# Patient Record
Sex: FEMALE | Race: WHITE | NOT HISPANIC OR LATINO | Employment: OTHER | URBAN - METROPOLITAN AREA
[De-identification: names, ages, dates, MRNs, and addresses within clinical notes are randomized per-mention and may not be internally consistent; named-entity substitution may affect disease eponyms.]

---

## 2017-04-19 ENCOUNTER — TRANSFERRED RECORDS (OUTPATIENT)
Dept: HEALTH INFORMATION MANAGEMENT | Facility: CLINIC | Age: 72
End: 2017-04-19

## 2017-04-26 ENCOUNTER — SURGERY (OUTPATIENT)
Age: 72
End: 2017-04-26

## 2017-04-26 ENCOUNTER — HOSPITAL ENCOUNTER (OUTPATIENT)
Facility: CLINIC | Age: 72
Discharge: HOME | End: 2017-04-26
Attending: COLON & RECTAL SURGERY | Admitting: COLON & RECTAL SURGERY
Payer: MEDICARE

## 2017-04-26 ENCOUNTER — ANESTHESIA EVENT (OUTPATIENT)
Dept: GASTROENTEROLOGY | Facility: CLINIC | Age: 72
End: 2017-04-26
Payer: MEDICARE

## 2017-04-26 ENCOUNTER — ANESTHESIA (OUTPATIENT)
Dept: GASTROENTEROLOGY | Facility: CLINIC | Age: 72
End: 2017-04-26
Payer: MEDICARE

## 2017-04-26 VITALS
DIASTOLIC BLOOD PRESSURE: 88 MMHG | BODY MASS INDEX: 23.16 KG/M2 | SYSTOLIC BLOOD PRESSURE: 152 MMHG | HEIGHT: 65 IN | RESPIRATION RATE: 11 BRPM | OXYGEN SATURATION: 97 % | WEIGHT: 139 LBS

## 2017-04-26 LAB — COLONOSCOPY: NORMAL

## 2017-04-26 PROCEDURE — 25800025 ZZH RX 258: Performed by: NURSE ANESTHETIST, CERTIFIED REGISTERED

## 2017-04-26 PROCEDURE — 25000125 ZZHC RX 250: Performed by: NURSE ANESTHETIST, CERTIFIED REGISTERED

## 2017-04-26 PROCEDURE — G0105 COLORECTAL SCRN; HI RISK IND: HCPCS | Performed by: COLON & RECTAL SURGERY

## 2017-04-26 PROCEDURE — 37000008 ZZH ANESTHESIA TECHNICAL FEE, 1ST 30 MIN: Performed by: COLON & RECTAL SURGERY

## 2017-04-26 PROCEDURE — 45378 DIAGNOSTIC COLONOSCOPY: CPT | Performed by: COLON & RECTAL SURGERY

## 2017-04-26 PROCEDURE — 37000009 ZZH ANESTHESIA TECHNICAL FEE, EACH ADDTL 15 MIN: Performed by: COLON & RECTAL SURGERY

## 2017-04-26 PROCEDURE — 25000128 H RX IP 250 OP 636: Performed by: NURSE ANESTHETIST, CERTIFIED REGISTERED

## 2017-04-26 PROCEDURE — 40000010 ZZH STATISTIC ANES STAT CODE-CRNA PER MINUTE: Performed by: COLON & RECTAL SURGERY

## 2017-04-26 RX ORDER — ONDANSETRON 4 MG/1
4 TABLET, ORALLY DISINTEGRATING ORAL EVERY 6 HOURS PRN
Status: DISCONTINUED | OUTPATIENT
Start: 2017-04-26 | End: 2017-04-26 | Stop reason: HOSPADM

## 2017-04-26 RX ORDER — ONDANSETRON 2 MG/ML
4 INJECTION INTRAMUSCULAR; INTRAVENOUS EVERY 6 HOURS PRN
Status: DISCONTINUED | OUTPATIENT
Start: 2017-04-26 | End: 2017-04-26 | Stop reason: HOSPADM

## 2017-04-26 RX ORDER — PROCHLORPERAZINE MALEATE 5 MG
5 TABLET ORAL EVERY 6 HOURS PRN
Status: DISCONTINUED | OUTPATIENT
Start: 2017-04-26 | End: 2017-04-26 | Stop reason: HOSPADM

## 2017-04-26 RX ORDER — ONDANSETRON 2 MG/ML
INJECTION INTRAMUSCULAR; INTRAVENOUS PRN
Status: DISCONTINUED | OUTPATIENT
Start: 2017-04-26 | End: 2017-04-26

## 2017-04-26 RX ORDER — ONDANSETRON 2 MG/ML
4 INJECTION INTRAMUSCULAR; INTRAVENOUS
Status: DISCONTINUED | OUTPATIENT
Start: 2017-04-26 | End: 2017-04-26 | Stop reason: HOSPADM

## 2017-04-26 RX ORDER — PROPOFOL 10 MG/ML
INJECTION, EMULSION INTRAVENOUS CONTINUOUS PRN
Status: DISCONTINUED | OUTPATIENT
Start: 2017-04-26 | End: 2017-04-26

## 2017-04-26 RX ORDER — FENTANYL CITRATE 50 UG/ML
INJECTION, SOLUTION INTRAMUSCULAR; INTRAVENOUS PRN
Status: DISCONTINUED | OUTPATIENT
Start: 2017-04-26 | End: 2017-04-26

## 2017-04-26 RX ORDER — HYDRALAZINE HYDROCHLORIDE 20 MG/ML
INJECTION INTRAMUSCULAR; INTRAVENOUS PRN
Status: DISCONTINUED | OUTPATIENT
Start: 2017-04-26 | End: 2017-04-26

## 2017-04-26 RX ORDER — ASPIRIN 325 MG/1
650 TABLET, FILM COATED ORAL 2 TIMES DAILY PRN
Status: ON HOLD | COMMUNITY
End: 2023-09-22

## 2017-04-26 RX ORDER — SODIUM CHLORIDE, SODIUM LACTATE, POTASSIUM CHLORIDE, CALCIUM CHLORIDE 600; 310; 30; 20 MG/100ML; MG/100ML; MG/100ML; MG/100ML
INJECTION, SOLUTION INTRAVENOUS CONTINUOUS PRN
Status: DISCONTINUED | OUTPATIENT
Start: 2017-04-26 | End: 2017-04-26

## 2017-04-26 RX ORDER — LIDOCAINE 40 MG/G
CREAM TOPICAL
Status: DISCONTINUED | OUTPATIENT
Start: 2017-04-26 | End: 2017-04-26 | Stop reason: HOSPADM

## 2017-04-26 RX ORDER — FLUMAZENIL 0.1 MG/ML
0.2 INJECTION, SOLUTION INTRAVENOUS
Status: DISCONTINUED | OUTPATIENT
Start: 2017-04-26 | End: 2017-04-26 | Stop reason: HOSPADM

## 2017-04-26 RX ORDER — NALOXONE HYDROCHLORIDE 0.4 MG/ML
.1-.4 INJECTION, SOLUTION INTRAMUSCULAR; INTRAVENOUS; SUBCUTANEOUS
Status: DISCONTINUED | OUTPATIENT
Start: 2017-04-26 | End: 2017-04-26 | Stop reason: HOSPADM

## 2017-04-26 RX ADMIN — FENTANYL CITRATE 50 MCG: 50 INJECTION, SOLUTION INTRAMUSCULAR; INTRAVENOUS at 11:06

## 2017-04-26 RX ADMIN — SODIUM CHLORIDE, POTASSIUM CHLORIDE, SODIUM LACTATE AND CALCIUM CHLORIDE: 600; 310; 30; 20 INJECTION, SOLUTION INTRAVENOUS at 10:47

## 2017-04-26 RX ADMIN — PROPOFOL 80 MCG/KG/MIN: 10 INJECTION, EMULSION INTRAVENOUS at 10:50

## 2017-04-26 RX ADMIN — HYDRALAZINE HYDROCHLORIDE 5 MG: 20 INJECTION INTRAMUSCULAR; INTRAVENOUS at 11:06

## 2017-04-26 RX ADMIN — MIDAZOLAM HYDROCHLORIDE 2 MG: 1 INJECTION, SOLUTION INTRAMUSCULAR; INTRAVENOUS at 10:50

## 2017-04-26 RX ADMIN — DEXMEDETOMIDINE HYDROCHLORIDE 8 MCG: 100 INJECTION, SOLUTION INTRAVENOUS at 10:51

## 2017-04-26 RX ADMIN — ONDANSETRON 4 MG: 2 INJECTION INTRAMUSCULAR; INTRAVENOUS at 11:01

## 2017-04-26 RX ADMIN — DEXMEDETOMIDINE HYDROCHLORIDE 4 MCG: 100 INJECTION, SOLUTION INTRAVENOUS at 10:59

## 2017-04-26 RX ADMIN — FENTANYL CITRATE 50 MCG: 50 INJECTION, SOLUTION INTRAMUSCULAR; INTRAVENOUS at 10:50

## 2017-04-26 ASSESSMENT — ENCOUNTER SYMPTOMS: SEIZURES: 0

## 2017-04-26 NOTE — ANESTHESIA POSTPROCEDURE EVALUATION
Patient: Sixto Jimenez    Procedure(s):  COLONOSCOPY (MAC)  - Wound Class: II-Clean Contaminated    Diagnosis:PERSONAL HISTORY OF COLON CANCER   Diagnosis Additional Information: No value filed.    Anesthesia Type:  MAC    Note:  Anesthesia Post Evaluation    Patient location during evaluation: Endoscopy Recovery  Patient participation: Able to fully participate in evaluation  Level of consciousness: awake  Pain management: adequate  Airway patency: patent  Cardiovascular status: acceptable  Respiratory status: acceptable  Hydration status: acceptable  PONV: none     Anesthetic complications: None    Comments: Patient with elevated diastolic pressure in Preop.  Minimal improvement intraoperatively after sedation.  Treated with 5 mg hydralazine  With good response.  In post op patient instructed to followup with her primary care physician.         Last vitals:  Vitals:    04/26/17 1140 04/26/17 1150 04/26/17 1200   BP: 136/85 (!) 149/95 152/88   Resp: 20 9 11   SpO2: 96% 98% 97%         Electronically Signed By: Taqueria Bee MD  April 26, 2017  5:23 PM

## 2017-04-26 NOTE — ANESTHESIA CARE TRANSFER NOTE
Patient: Sixto Jimenez    Procedure(s):  COLONOSCOPY (MAC)  - Wound Class: II-Clean Contaminated    Diagnosis: PERSONAL HISTORY OF COLON CANCER   Diagnosis Additional Information: No value filed.    Anesthesia Type:   MAC     Note:  Airway :Room Air  Destination: endo 35.  Comments: Pt exhibits spont resps, all monitors and alarms on in pacu, report given to RN, vss.      Vitals: (Last set prior to Anesthesia Care Transfer)    CRNA VITALS  4/26/2017 1049 - 4/26/2017 1125      4/26/2017             NIBP: 118/71    Pulse: 78    NIBP Mean: 87    Ht Rate: 75    SpO2: 98 %    Resp Rate (set): 10                Electronically Signed By: EMANUEL Ennis CRNA  April 26, 2017  11:25 AM

## 2017-04-26 NOTE — BRIEF OP NOTE
[facility] Brief Operative Note    Pre-operative diagnosis: PERSONAL HISTORY OF COLON CANCER    Post-operative diagnosis normal colon      Procedure: Procedure(s):  COLONOSCOPY (MAC)  - Wound Class: II-Clean Contaminated   Surgeon(s): Surgeon(s) and Role:     * Diana Vázquez MD - Primary   Estimated blood loss: * No values recorded between 4/26/2017 12:00 AM and 4/26/2017 11:17 AM *    Specimens: * No specimens in log *   Findings: See Provation procedure note in Epic

## 2017-04-26 NOTE — ANESTHESIA PREPROCEDURE EVALUATION
Procedure: Procedure(s):  COLONOSCOPY  Preop diagnosis: PERSONAL HISTORY OF COLON CANCER   Allergies   Allergen Reactions     Fosamax [Alendronic Acid]      Morphine      Panic attack     Seasonal Allergies      There is no problem list on file for this patient.    Past Medical History:   Diagnosis Date     Allergic rhinitis, cause unspecified      Chronic abdominal pain      Colon cancer (H)      Fibromyalgia      Past Surgical History:   Procedure Laterality Date     CHOLECYSTECTOMY, LAPOROSCOPIC      Cholecystectomy, Laparoscopic     COLON SURGERY       COLONOSCOPY  4/23/2014    Procedure: COLONOSCOPY;  Surgeon: Diana Vázquez MD;  Location: [facility]     fractured fibula       fractured wrist       TONSILLECTOMY         No current facility-administered medications on file prior to encounter.   Current Outpatient Prescriptions on File Prior to Encounter:  VITAMIN E COMPLEX PO    AMITRIPTYLINE HCL PO    TEMAZEPAM PO    HYDROcodone-acetaminophen (NORCO) 5-325 MG per tablet Take 1 tablet by mouth every 6 hours as needed for moderate to severe pain   Acetaminophen (TYLENOL PO)    PREDNISONE 20 MG OR TABS 1 TABLET DAILY   DETROL LA 4 MG OR CP24 1 CAPSULE DAILY   ALLEGRA 180 MG OR TABS 1 tablet qd   NIACIN  MG OR TBCR 1 TABLET tiMES DAILY   NIACIN 500 MG OR TBCR 1 TABLET  DAILY   CALCIUM 600 MG OR TABS unknown dose once or twice daily   MULTI-VITAMIN OR TABS    ALLEGRA OR 2 TABLETS TWICE DAILY   DETROL LA OR 1 CAPSULE DAILY     There were no vitals taken for this visit.    Lab Results   Component Value Date    WBC 5.1 04/26/2006     HGB 13.6  Anesthesia Evaluation     . Pt has had prior anesthetic.     No history of anesthetic complications          ROS/MED HX    ENT/Pulmonary:     (+)allergic rhinitis, , . .   (-) recent URI   Neurologic:  - neg neurologic ROS    (-) seizures, CVA and migraines   Cardiovascular: Comment: Elevated BP in preop - ? White coat syndrome    (+) Dyslipidemia, ----. : . . . :.  Irregular Heartbeat/Palpitations, valvular problems/murmurs type: MVP .       METS/Exercise Tolerance:     Hematologic:     (+) Anemia, -      Musculoskeletal: Comment: Myalgia and myositis  sciatica  (+) arthritis, , , -       GI/Hepatic:  - neg GI/hepatic ROS   (+) Other GI/Hepatic colon cancer, chronic abdominal pain     (-) GERD   Renal/Genitourinary: Comment: Urinary incontinence    Urethral stricture        Endo:  - neg endo ROS    (-) Type II DM and thyroid disease   Psychiatric:     (+) psychiatric history anxiety      Infectious Disease:         Malignancy:         Other: Comment: Controlled substance agreement   (+) H/O Chronic Pain,                   Physical Exam  Normal systems: cardiovascular, pulmonary and dental    Airway   Mallampati: II  TM distance: >3 FB  Neck ROM: full    Dental     Cardiovascular   Rhythm and rate: regular and normal      Pulmonary    breath sounds clear to auscultation                    Anesthesia Plan      History & Physical Review  History and physical reviewed and following examination; no interval change.    ASA Status:  2 .    NPO Status:  > 8 hours    Plan for MAC Reason for MAC:  Deep or markedly invasive procedure (G8)  PONV prophylaxis:  Ondansetron (or other 5HT-3)       Postoperative Care  Postoperative pain management:  IV analgesics.      Consents  Anesthetic plan, risks, benefits and alternatives discussed with:  Patient and Spouse..                          .

## 2018-11-02 ENCOUNTER — TELEPHONE (OUTPATIENT)
Dept: CARDIOLOGY | Facility: CLINIC | Age: 73
End: 2018-11-02

## 2018-11-02 NOTE — TELEPHONE ENCOUNTER
Call from Sixto Jimenez.  Her mother had angina, her sister has heart problem unknown to Sixto, but does also have an irregular heart beat.  Sixto has had an irregular heartbeat for several years, and had a monitor placed about 7 years ago.  Had a heart scan in 2015 at [facility].  Now has high blood pressure and has had high cholesterol for quite a few years.  PMD is Keisha Pitts at [facility].  Sixto will see her around 11/14, and will have her fax pertinent records to Triage (gave her fax # for here).  Advised patient that if her irregular heartbeat becomes longer-lasting or she starts feeling unwell with it, she should be seen in the ED, preferrably at [facility], if possible.  She lives in [place], close to the office, and she says that she will do that.  She requested to see Dr. Charles because her sister sees Dr. Charles.

## 2019-01-28 PROBLEM — E78.5 HYPERLIPIDEMIA: Status: ACTIVE | Noted: 2019-01-28

## 2019-01-28 PROBLEM — E78.2 MIXED HYPERLIPIDEMIA: Status: ACTIVE | Noted: 2019-01-28

## 2019-01-28 PROBLEM — R03.0 ELEVATED BLOOD PRESSURE READING WITHOUT DIAGNOSIS OF HYPERTENSION: Status: ACTIVE | Noted: 2017-10-15

## 2019-01-28 PROBLEM — I05.9 MITRAL VALVE DISEASE: Status: ACTIVE | Noted: 2019-01-28

## 2019-01-28 PROBLEM — I10 BENIGN ESSENTIAL HYPERTENSION: Status: ACTIVE | Noted: 2017-10-15

## 2019-01-29 ENCOUNTER — OFFICE VISIT (OUTPATIENT)
Dept: CARDIOLOGY | Facility: CLINIC | Age: 74
End: 2019-01-29
Payer: MEDICARE

## 2019-01-29 VITALS
DIASTOLIC BLOOD PRESSURE: 82 MMHG | WEIGHT: 149 LBS | HEART RATE: 60 BPM | HEIGHT: 63 IN | SYSTOLIC BLOOD PRESSURE: 114 MMHG | BODY MASS INDEX: 26.4 KG/M2

## 2019-01-29 DIAGNOSIS — R00.2 PALPITATIONS: ICD-10-CM

## 2019-01-29 DIAGNOSIS — I10 BENIGN ESSENTIAL HYPERTENSION: Primary | ICD-10-CM

## 2019-01-29 PROCEDURE — 93000 ELECTROCARDIOGRAM COMPLETE: CPT | Performed by: INTERNAL MEDICINE

## 2019-01-29 PROCEDURE — 99214 OFFICE O/P EST MOD 30 MIN: CPT | Performed by: INTERNAL MEDICINE

## 2019-01-29 RX ORDER — METOPROLOL SUCCINATE 25 MG/1
100 TABLET, EXTENDED RELEASE ORAL DAILY
COMMUNITY

## 2019-01-29 RX ORDER — MULTIVIT-MIN/IRON/FOLIC ACID/K 18-600-40
1000 CAPSULE ORAL
COMMUNITY
End: 2023-09-20

## 2019-01-29 RX ORDER — LORAZEPAM 0.5 MG/1
0.25 TABLET ORAL DAILY PRN
Status: ON HOLD | COMMUNITY
End: 2023-09-23

## 2019-01-29 SDOH — HEALTH STABILITY: MENTAL HEALTH: HOW OFTEN DO YOU HAVE A DRINK CONTAINING ALCOHOL?: NEVER

## 2019-01-29 ASSESSMENT — MIFFLIN-ST. JEOR: SCORE: 1163.95

## 2019-01-29 NOTE — LETTER
2019    JOANNE  YEMI  [facility] [address]  [facility] [address]    RE: Sixto Jimenez       Dear Colleague,    I had the pleasure of seeing Sixto Jimenez in the [facility].    HPI:     Please see dictated note    PAST MEDICAL HISTORY:  Past Medical History:   Diagnosis Date     Allergic rhinitis, cause unspecified      Benign essential hypertension 10/15/2017    Overview:  Trial of lisinopril, propranolol; Stresses anxety, amlodipine 2.5 min bowel not emptying Metoprolol: 25 mg is ok.  Increase to 1.5, bowel does not  across upper right. Stenotic area.      Chronic abdominal pain      Chronic bilateral low back pain     DDD     Chronic constipation      Colon cancer (H)      Fibromyalgia      Heart disease     MVP and PVC's     Mixed hyperlipidemia 2019    Overview:  Last Lipids: Chol: 245    2007 T    2007 HDL:   94    2007 LDL:  128    2007     Osteoporosis        CURRENT MEDICATIONS:  Current Outpatient Medications   Medication Sig Dispense Refill     AMITRIPTYLINE HCL PO Take 25 mg by mouth        aspirin - buffered (BUFFERIN) 325 MG TABS tablet Take 1,200 mg by mouth daily        HYDROcodone-acetaminophen (NORCO) 5-325 MG per tablet Take 0.5 tablets by mouth daily        LORazepam (ATIVAN) 1 MG tablet Take 1 mg by mouth every 6 hours as needed for anxiety       metoprolol succinate ER (TOPROL-XL) 25 MG 24 hr tablet Take 125 mg by mouth daily Takes 50mg qam and 75mg qhs       MULTI-VITAMIN OR TABS   0     Omega-3 Fatty Acids (FISH OIL PO)        Sennosides (SENNA LAXATIVE PO) Take by mouth 6 times daily       TEMAZEPAM PO Take 15 mg by mouth nightly as needed        Vitamin D, Cholecalciferol, 1000 units TABS Take 1,000 Units by mouth       CALCIUM 600 MG OR TABS unknown dose once or twice daily  0       PAST SURGICAL HISTORY:  Past Surgical History:   Procedure Laterality Date     CHOLECYSTECTOMY, LAPOROSCOPIC       Cholecystectomy, Laparoscopic     COLON SURGERY       COLONOSCOPY  4/23/2014    Procedure: COLONOSCOPY;  Surgeon: Diana Vázquez MD;  Location:  GI     COLONOSCOPY N/A 4/26/2017    Procedure: COLONOSCOPY;  COLONOSCOPY (MAC) ;  Surgeon: Diana Vázquez MD;  Location:  GI     fractured fibula       fractured wrist       GYN SURGERY      tubal ligation     ILEOSTOMY      leaks afterwards     TONSILLECTOMY         ALLERGIES     Allergies   Allergen Reactions     Fosamax [Alendronic Acid]      Shut down system     Morphine      Mental status change     Risedronate Sodium [Risedronate]      Shut down system       FAMILY HISTORY:  No family history on file.    SOCIAL HISTORY:  Social History     Socioeconomic History     Marital status:      Spouse name: Not on file     Number of children: Not on file     Years of education: Not on file     Highest education level: Not on file   Social Needs     Financial resource strain: Not on file     Food insecurity - worry: Not on file     Food insecurity - inability: Not on file     Transportation needs - medical: Not on file     Transportation needs - non-medical: Not on file   Occupational History     Not on file   Tobacco Use     Smoking status: Former Smoker   Substance and Sexual Activity     Alcohol use: Yes     Comment: occasionally     Drug use: No     Sexual activity: Not on file   Other Topics Concern     Parent/sibling w/ CABG, MI or angioplasty before 65F 55M? Not Asked   Social History Narrative     Not on file       ROS:   Constitutional: No fever, chills, or sweats. No weight gain/loss   ENT: No visual disturbance, ear ache, epistaxis, sore throat  Allergies/Immunologic: Negative.   Respiratory: No cough, hemoptysia  Cardiovascular: As per HPI  GI: No nausea, vomiting, hematemesis, melena, or hematochezia  : No urinary frequency, dysuria, or hematuria  Integument: Negative  Psychiatric: Negative  Neuro: Negative  Endocrinology: Negative  "  Musculoskeletal: Negative    EXAM:  Ht 1.607 m (5' 3.25\")   Wt 67.6 kg (149 lb)   BMI 26.19 kg/m     In general, the patient is a pleasant female in no apparent distress.    HEENT: NC/AT.  PERRLA.  EOMI.  Sclerae white, not injected.  Nares clear.  Pharynx without erythema or exudate.  Dentition intact.    Neck: No adenopathy.  No thyromegaly. Carotids +4/4 bilaterally without bruits.  No jugular venous distension.   Heart: RRR. Normal S1, S2 splits physiologically. No murmur, rub, click, or gallop. The PMI is in the 5th ICS in the midclavicular line. There is no heave.    Lungs: CTA.  No ronchi, wheezes, rales.  No dullness to percussion.   Abdomen: Soft, nontender, nondistended. No organomegaly.  No bruits.   Extremities: No clubbing, cyanosis, or edema.  The pulses are +4/4 at the radial, brachial, femoral, popliteal, DP, and PT sites bilaterally.  No bruits are noted.  Neurologic: Alert and oriented to person/place/time, normal speech, gait and affect  Skin: No petechiae, purpura or rash.    ROSETTA Charles MD [facility]      CC  Patient Care Team:  Keihsa Pitts as PCP - KEISHA Ward    Service Date: 01/29/2019      HISTORY OF PRESENT ILLNESS:  Ms. Jimenez is a very pleasant, 73-year-old woman who is establishing care in Cardiology Clinic.  She has no known cardiac history.  She has some right arm pain, which is clearly musculoskeletal and not associated with her heart, but I think that was part of the reason for her visit.  It has been going on for a couple of months.  She has been doing physical therapy, but is thinking at this point that maybe she needs imaging from an orthopedist.  She also has a history of stage III colon cancer back in 2010, for which she received 6 months of chemotherapy and had to undergo 6 surgeries, but that is felt to be cured.  She did have an echo around that time in 2011 that showed a structurally normal heart and normal ejection fraction, no regional wall motion " abnormalities.  She saw Dr. Palacios at [facility] for preventative health in 10/2015.  At that time, he just noted that she had some hypertension and recommended she start a blood pressure medicine and hyperlipidemia.  Her cholesterol was 176, and he discussed starting a statin.  She finally started the blood pressure medicine about a year ago by her primary.  She was started on Toprol 50 in the morning and 75 in the evening, which she has been taking and tolerating.  She is on doses of aspirin as well for her fibromyalgia, presumably.  Her blood pressure is under good control.  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  She is .  She walks on her Fitbit 2.5-5 miles depending on the day and feels good with that.  No chest pain or tightness.  I do note that back in 2015, she had a coronary calcium score in December, and it was 0.4, so very low, and the decision was made to not start a statin.  Her most recent cholesterol in the system was from 03/2017.  Her LDL was 151 with an HDL of 71.  She does have a family history of early coronary artery disease with dad having an MI in his 40s, but he was a smoker.  She is otherwise healthy with no concerning symptoms.      IMPRESSION, REPORT AND PLAN:   1.  History of hypertension, well controlled on Toprol.   2.  Fibromyalgia.   3.  History of low coronary calcium score in 12/2015 at 0.4 without any cardiac symptoms.   4.  Hyperlipidemia, not on a statin with an LDL of 151 and HDL of 71 when last checked 03/2017.   5.  History of colon cancer, status post chemotherapy and resection in 2010.   6.  Right arm musculoskeletal symptoms.      DISCUSSION:  It was a pleasure being involved in the care of Ms. Jimenez.  I discussed her history and symptoms in detail as outlined.  Fortunately, at this time she is doing well without any concerning cardiac symptoms.  We discussed that I would agree that I would not force her to start a statin when she is hesitant to  do so given her low coronary calcium score.  It would not be unreasonable to recheck her cholesterol and see where she is at now, which she plans to do.  I encouraged her to get more active in terms of exercise as well.  Her blood pressure is under good control.  She is on a baby aspirin.  There are no additional recommendations at this time from a cardiac standpoint, which she understands.  She will call if any additional symptoms occur.        It was a pleasure being involved in her care.  Please do not hesitate to contact me with any questions or concerns.         GARCÍA CHARLES MD             D: 2019   T: 2019   MT: [email or web address]      Name:     RIGOBERTO BRANDON   MRN:      [id number]        Account:      [id number]   :      1945           Service Date: 2019      Document: [id number]       Thank you for allowing me to participate in the care of your patient.      Sincerely,     García Charles MD     [facility]    cc:   Keisha Pitts  [facility] MEDICAL [address] [address]

## 2019-01-29 NOTE — LETTER
1/29/2019      JOANNE  WALKER  [facility] [address]  [facility] [address]      RE: Sixto Jimenez       Dear Colleague,    I had the pleasure of seeing Sixto Jimenez in the [facility].    Service Date: 01/29/2019      HISTORY OF PRESENT ILLNESS:  Ms. Jimenez is a very pleasant, 73-year-old woman who is establishing care in Cardiology Clinic.  She has no known cardiac history.  She has some right arm pain, which is clearly musculoskeletal and not associated with her heart, but I think that was part of the reason for her visit.  It has been going on for a couple of months.  She has been doing physical therapy, but is thinking at this point that maybe she needs imaging from an orthopedist.  She also has a history of stage III colon cancer back in 2010, for which she received 6 months of chemotherapy and had to undergo 6 surgeries, but that is felt to be cured.  She did have an echo around that time in 2011 that showed a structurally normal heart and normal ejection fraction, no regional wall motion abnormalities.  She saw Dr. Palacios at [facility] for preventative health in 10/2015.  At that time, he just noted that she had some hypertension and recommended she start a blood pressure medicine and hyperlipidemia.  Her cholesterol was 176, and he discussed starting a statin.  She finally started the blood pressure medicine about a year ago by her primary.  She was started on Toprol 50 in the morning and 75 in the evening, which she has been taking and tolerating.  She is on doses of aspirin as well for her fibromyalgia, presumably.  Her blood pressure is under good control.  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  She is .  She walks on her Fitbit 2.5-5 miles depending on the day and feels good with that.  No chest pain or tightness.  I do note that back in 2015, she had a coronary calcium score in December, and it was 0.4, so very low, and the  decision was made to not start a statin.  Her most recent cholesterol in the system was from 2017.  Her LDL was 151 with an HDL of 71.  She does have a family history of early coronary artery disease with dad having an MI in his 40s, but he was a smoker.  She is otherwise healthy with no concerning symptoms.      IMPRESSION, REPORT AND PLAN:   1.  History of hypertension, well controlled on Toprol.   2.  Fibromyalgia.   3.  History of low coronary calcium score in 2015 at 0.4 without any cardiac symptoms.   4.  Hyperlipidemia, not on a statin with an LDL of 151 and HDL of 71 when last checked 2017.   5.  History of colon cancer, status post chemotherapy and resection in .   6.  Right arm musculoskeletal symptoms.      DISCUSSION:  It was a pleasure being involved in the care of Ms. Jimenez.  I discussed her history and symptoms in detail as outlined.  Fortunately, at this time she is doing well without any concerning cardiac symptoms.  We discussed that I would agree that I would not force her to start a statin when she is hesitant to do so given her low coronary calcium score.  It would not be unreasonable to recheck her cholesterol and see where she is at now, which she plans to do.  I encouraged her to get more active in terms of exercise as well.  Her blood pressure is under good control.  She is on a baby aspirin.  There are no additional recommendations at this time from a cardiac standpoint, which she understands.  She will call if any additional symptoms occur.        It was a pleasure being involved in her care.  Please do not hesitate to contact me with any questions or concerns.         NANNETTE JORDAN MD             D: 2019   T: 2019   MT: deshaun      Name:     RIGOBERTO JIMENEZ   MRN:      [id number]        Account:      [id number]   :      1945           Service Date: 2019      Document: [id number]         Outpatient Encounter Medications as of 2019   Medication  Sig Dispense Refill     AMITRIPTYLINE HCL PO Take 25 mg by mouth        aspirin - buffered (BUFFERIN) 325 MG TABS tablet Take 1,200 mg by mouth daily        HYDROcodone-acetaminophen (NORCO) 5-325 MG per tablet Take 0.5 tablets by mouth daily        LORazepam (ATIVAN) 1 MG tablet Take 1 mg by mouth every 6 hours as needed for anxiety       metoprolol succinate ER (TOPROL-XL) 25 MG 24 hr tablet Take 125 mg by mouth daily Takes 50mg qam and 75mg qhs       MULTI-VITAMIN OR TABS   0     Omega-3 Fatty Acids (FISH OIL PO)        Sennosides (SENNA LAXATIVE PO) Take by mouth 6 times daily       TEMAZEPAM PO Take 15 mg by mouth nightly as needed        Vitamin D, Cholecalciferol, 1000 units TABS Take 1,000 Units by mouth       CALCIUM 600 MG OR TABS unknown dose once or twice daily  0     No facility-administered encounter medications on file as of 1/29/2019.        Again, thank you for allowing me to participate in the care of your patient.      Sincerely,    García Charles MD     [facility]

## 2019-01-29 NOTE — PROGRESS NOTES
Service Date: 01/29/2019      HISTORY OF PRESENT ILLNESS:  Ms. Jimenez is a very pleasant, 73-year-old woman who is establishing care in [facility].  She has no known cardiac history.  She has some right arm pain, which is clearly musculoskeletal and not associated with her heart, but I think that was part of the reason for her visit.  It has been going on for a couple of months.  She has been doing physical therapy, but is thinking at this point that maybe she needs imaging from an orthopedist.  She also has a history of stage III colon cancer back in 2010, for which she received 6 months of chemotherapy and had to undergo 6 surgeries, but that is felt to be cured.  She did have an echo around that time in 2011 that showed a structurally normal heart and normal ejection fraction, no regional wall motion abnormalities.  She saw Dr. Palacios at [facility] for preventative health in 10/2015.  At that time, he just noted that she had some hypertension and recommended she start a blood pressure medicine and hyperlipidemia.  Her cholesterol was 176, and he discussed starting a statin.  She finally started the blood pressure medicine about a year ago by her primary.  She was started on Toprol 50 in the morning and 75 in the evening, which she has been taking and tolerating.  She is on doses of aspirin as well for her fibromyalgia, presumably.  Her blood pressure is under good control.  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  She is .  She walks on her Fitbit 2.5-5 miles depending on the day and feels good with that.  No chest pain or tightness.  I do note that back in 2015, she had a coronary calcium score in December, and it was 0.4, so very low, and the decision was made to not start a statin.  Her most recent cholesterol in the system was from 03/2017.  Her LDL was 151 with an HDL of 71.  She does have a family history of early coronary artery disease with dad having an MI in his 40s, but  he was a smoker.  She is otherwise healthy with no concerning symptoms.      IMPRESSION, REPORT AND PLAN:   1.  History of hypertension, well controlled on Toprol.   2.  Fibromyalgia.   3.  History of low coronary calcium score in 2015 at 0.4 without any cardiac symptoms.   4.  Hyperlipidemia, not on a statin with an LDL of 151 and HDL of 71 when last checked 2017.   5.  History of colon cancer, status post chemotherapy and resection in .   6.  Right arm musculoskeletal symptoms.      DISCUSSION:  It was a pleasure being involved in the care of Ms. Jimenez.  I discussed her history and symptoms in detail as outlined.  Fortunately, at this time she is doing well without any concerning cardiac symptoms.  We discussed that I would agree that I would not force her to start a statin when she is hesitant to do so given her low coronary calcium score.  It would not be unreasonable to recheck her cholesterol and see where she is at now, which she plans to do.  I encouraged her to get more active in terms of exercise as well.  Her blood pressure is under good control.  She is on a baby aspirin.  There are no additional recommendations at this time from a cardiac standpoint, which she understands.  She will call if any additional symptoms occur.        It was a pleasure being involved in her care.  Please do not hesitate to contact me with any questions or concerns.         NANNETTE JORDAN MD             D: 2019   T: 2019   MT: [email or web address]      Name:     RIGOBERTO JIMENEZ   MRN:      [id number]        Account:      [id number]   :      1945           Service Date: 2019      Document: [id number]

## 2019-01-29 NOTE — PROGRESS NOTES
HPI:     Please see dictated note    PAST MEDICAL HISTORY:  Past Medical History:   Diagnosis Date     Allergic rhinitis, cause unspecified      Benign essential hypertension 10/15/2017    Overview:  Trial of lisinopril, propranolol; Stresses anxety, amlodipine 2.5 min bowel not emptying Metoprolol: 25 mg is ok.  Increase to 1.5, bowel does not  across upper right. Stenotic area.      Chronic abdominal pain      Chronic bilateral low back pain     DDD     Chronic constipation      Colon cancer (H)      Fibromyalgia      Heart disease     MVP and PVC's     Mixed hyperlipidemia 2019    Overview:  Last Lipids: Chol: 245    2007 T    2007 HDL:   94    2007 LDL:  128    2007     Osteoporosis        CURRENT MEDICATIONS:  Current Outpatient Medications   Medication Sig Dispense Refill     AMITRIPTYLINE HCL PO Take 25 mg by mouth        aspirin - buffered (BUFFERIN) 325 MG TABS tablet Take 1,200 mg by mouth daily        HYDROcodone-acetaminophen (NORCO) 5-325 MG per tablet Take 0.5 tablets by mouth daily        LORazepam (ATIVAN) 1 MG tablet Take 1 mg by mouth every 6 hours as needed for anxiety       metoprolol succinate ER (TOPROL-XL) 25 MG 24 hr tablet Take 125 mg by mouth daily Takes 50mg qam and 75mg qhs       MULTI-VITAMIN OR TABS   0     Omega-3 Fatty Acids (FISH OIL PO)        Sennosides (SENNA LAXATIVE PO) Take by mouth 6 times daily       TEMAZEPAM PO Take 15 mg by mouth nightly as needed        Vitamin D, Cholecalciferol, 1000 units TABS Take 1,000 Units by mouth       CALCIUM 600 MG OR TABS unknown dose once or twice daily  0       PAST SURGICAL HISTORY:  Past Surgical History:   Procedure Laterality Date     CHOLECYSTECTOMY, LAPOROSCOPIC      Cholecystectomy, Laparoscopic     COLON SURGERY       COLONOSCOPY  2014    Procedure: COLONOSCOPY;  Surgeon: Diana Vázquez MD;  Location:  GI     COLONOSCOPY N/A 2017    Procedure: COLONOSCOPY;  COLONOSCOPY  "(MAC) ;  Surgeon: Diana Vázquez MD;  Location:  GI     fractured fibula       fractured wrist       GYN SURGERY      tubal ligation     ILEOSTOMY      leaks afterwards     TONSILLECTOMY         ALLERGIES     Allergies   Allergen Reactions     Fosamax [Alendronic Acid]      Shut down system     Morphine      Mental status change     Risedronate Sodium [Risedronate]      Shut down system       FAMILY HISTORY:  No family history on file.    SOCIAL HISTORY:  Social History     Socioeconomic History     Marital status:      Spouse name: Not on file     Number of children: Not on file     Years of education: Not on file     Highest education level: Not on file   Social Needs     Financial resource strain: Not on file     Food insecurity - worry: Not on file     Food insecurity - inability: Not on file     Transportation needs - medical: Not on file     Transportation needs - non-medical: Not on file   Occupational History     Not on file   Tobacco Use     Smoking status: Former Smoker   Substance and Sexual Activity     Alcohol use: Yes     Comment: occasionally     Drug use: No     Sexual activity: Not on file   Other Topics Concern     Parent/sibling w/ CABG, MI or angioplasty before 65F 55M? Not Asked   Social History Narrative     Not on file       ROS:   Constitutional: No fever, chills, or sweats. No weight gain/loss   ENT: No visual disturbance, ear ache, epistaxis, sore throat  Allergies/Immunologic: Negative.   Respiratory: No cough, hemoptysia  Cardiovascular: As per HPI  GI: No nausea, vomiting, hematemesis, melena, or hematochezia  : No urinary frequency, dysuria, or hematuria  Integument: Negative  Psychiatric: Negative  Neuro: Negative  Endocrinology: Negative   Musculoskeletal: Negative    EXAM:  Ht 1.607 m (5' 3.25\")   Wt 67.6 kg (149 lb)   BMI 26.19 kg/m    In general, the patient is a pleasant female in no apparent distress.    HEENT: NC/AT.  RAJ.  EOMI.  Sclerae white, not " injected.  Nares clear.  Pharynx without erythema or exudate.  Dentition intact.    Neck: No adenopathy.  No thyromegaly. Carotids +4/4 bilaterally without bruits.  No jugular venous distension.   Heart: RRR. Normal S1, S2 splits physiologically. No murmur, rub, click, or gallop. The PMI is in the 5th ICS in the midclavicular line. There is no heave.    Lungs: CTA.  No ronchi, wheezes, rales.  No dullness to percussion.   Abdomen: Soft, nontender, nondistended. No organomegaly.  No bruits.   Extremities: No clubbing, cyanosis, or edema.  The pulses are +4/4 at the radial, brachial, femoral, popliteal, DP, and PT sites bilaterally.  No bruits are noted.  Neurologic: Alert and oriented to person/place/time, normal speech, gait and affect  Skin: No petechiae, purpura or rash.    ROSETTA Charles MD [facility]      CC  Patient Care Team:  Keisha Pitts as PCP - KEISHA Ward

## 2022-02-07 ENCOUNTER — TRANSFERRED RECORDS (OUTPATIENT)
Dept: HEALTH INFORMATION MANAGEMENT | Facility: CLINIC | Age: 77
End: 2022-02-07

## 2022-04-19 ENCOUNTER — TRANSFERRED RECORDS (OUTPATIENT)
Dept: HEALTH INFORMATION MANAGEMENT | Facility: CLINIC | Age: 77
End: 2022-04-19

## 2023-09-20 ENCOUNTER — HOSPITAL ENCOUNTER (INPATIENT)
Facility: CLINIC | Age: 78
LOS: 4 days | Discharge: NURSING, REHAB OR LONG-TERM CARE | DRG: 522 | End: 2023-09-24
Attending: EMERGENCY MEDICINE | Admitting: HOSPITALIST
Payer: MEDICARE

## 2023-09-20 ENCOUNTER — APPOINTMENT (OUTPATIENT)
Dept: GENERAL RADIOLOGY | Facility: CLINIC | Age: 78
DRG: 522 | End: 2023-09-20
Attending: EMERGENCY MEDICINE
Payer: MEDICARE

## 2023-09-20 DIAGNOSIS — M35.3 PMR (POLYMYALGIA RHEUMATICA) (H): ICD-10-CM

## 2023-09-20 DIAGNOSIS — F41.9 ANXIETY: ICD-10-CM

## 2023-09-20 DIAGNOSIS — S72.001A CLOSED DISPLACED FRACTURE OF RIGHT FEMORAL NECK (H): Primary | ICD-10-CM

## 2023-09-20 PROCEDURE — 72170 X-RAY EXAM OF PELVIS: CPT

## 2023-09-20 PROCEDURE — 250N000013 HC RX MED GY IP 250 OP 250 PS 637: Performed by: EMERGENCY MEDICINE

## 2023-09-20 PROCEDURE — 73560 X-RAY EXAM OF KNEE 1 OR 2: CPT | Mod: RT

## 2023-09-20 PROCEDURE — 73552 X-RAY EXAM OF FEMUR 2/>: CPT | Mod: RT

## 2023-09-20 PROCEDURE — 99418 PROLNG IP/OBS E/M EA 15 MIN: CPT | Performed by: HOSPITALIST

## 2023-09-20 PROCEDURE — 99223 1ST HOSP IP/OBS HIGH 75: CPT | Mod: AI | Performed by: HOSPITALIST

## 2023-09-20 PROCEDURE — 99285 EMERGENCY DEPT VISIT HI MDM: CPT | Mod: 25

## 2023-09-20 PROCEDURE — 120N000001 HC R&B MED SURG/OB

## 2023-09-20 RX ORDER — LISINOPRIL 10 MG/1
10 TABLET ORAL DAILY
Status: ON HOLD | COMMUNITY
End: 2023-09-23

## 2023-09-20 RX ORDER — HYDROCODONE BITARTRATE AND ACETAMINOPHEN 5; 325 MG/1; MG/1
2 TABLET ORAL ONCE
Status: COMPLETED | OUTPATIENT
Start: 2023-09-20 | End: 2023-09-20

## 2023-09-20 RX ORDER — NAPROXEN 500 MG/1
500 TABLET ORAL 2 TIMES DAILY WITH MEALS
Status: ON HOLD | COMMUNITY
End: 2023-09-23

## 2023-09-20 RX ORDER — PREDNISONE 1 MG/1
8 TABLET ORAL DAILY
Status: ON HOLD | COMMUNITY
End: 2023-09-23

## 2023-09-20 RX ORDER — AMOXICILLIN 250 MG
2 CAPSULE ORAL 3 TIMES DAILY
Status: ON HOLD | COMMUNITY
End: 2023-09-22

## 2023-09-20 RX ORDER — CLINDAMYCIN PHOSPHATE 10 UG/ML
LOTION TOPICAL 2 TIMES DAILY PRN
COMMUNITY

## 2023-09-20 RX ADMIN — HYDROCODONE BITARTRATE AND ACETAMINOPHEN 2 TABLET: 5; 325 TABLET ORAL at 21:38

## 2023-09-20 ASSESSMENT — ACTIVITIES OF DAILY LIVING (ADL)
ADLS_ACUITY_SCORE: 35
ADLS_ACUITY_SCORE: 35

## 2023-09-21 ENCOUNTER — ANESTHESIA EVENT (OUTPATIENT)
Dept: SURGERY | Facility: CLINIC | Age: 78
DRG: 522 | End: 2023-09-21
Payer: MEDICARE

## 2023-09-21 ENCOUNTER — APPOINTMENT (OUTPATIENT)
Dept: GENERAL RADIOLOGY | Facility: CLINIC | Age: 78
DRG: 522 | End: 2023-09-21
Attending: ORTHOPAEDIC SURGERY
Payer: MEDICARE

## 2023-09-21 ENCOUNTER — ANESTHESIA (OUTPATIENT)
Dept: SURGERY | Facility: CLINIC | Age: 78
DRG: 522 | End: 2023-09-21
Payer: MEDICARE

## 2023-09-21 PROBLEM — Z96.649 S/P TOTAL HIP ARTHROPLASTY: Status: ACTIVE | Noted: 2023-09-21

## 2023-09-21 LAB
ABO/RH(D): NORMAL
ALBUMIN SERPL BCG-MCNC: 3.5 G/DL (ref 3.5–5.2)
ALP SERPL-CCNC: 209 U/L (ref 35–104)
ALT SERPL W P-5'-P-CCNC: 22 U/L (ref 0–50)
ANION GAP SERPL CALCULATED.3IONS-SCNC: 11 MMOL/L (ref 7–15)
ANION GAP SERPL CALCULATED.3IONS-SCNC: 11 MMOL/L (ref 7–15)
ANTIBODY SCREEN: NEGATIVE
AST SERPL W P-5'-P-CCNC: 24 U/L (ref 0–45)
ATRIAL RATE - MUSE: 78 BPM
BASOPHILS # BLD AUTO: 0 10E3/UL (ref 0–0.2)
BASOPHILS # BLD AUTO: 0 10E3/UL (ref 0–0.2)
BASOPHILS NFR BLD AUTO: 0 %
BASOPHILS NFR BLD AUTO: 0 %
BILIRUB SERPL-MCNC: 0.4 MG/DL
BUN SERPL-MCNC: 12.1 MG/DL (ref 8–23)
BUN SERPL-MCNC: 13.8 MG/DL (ref 8–23)
CALCIUM SERPL-MCNC: 8.8 MG/DL (ref 8.8–10.2)
CALCIUM SERPL-MCNC: 8.8 MG/DL (ref 8.8–10.2)
CHLORIDE SERPL-SCNC: 97 MMOL/L (ref 98–107)
CHLORIDE SERPL-SCNC: 99 MMOL/L (ref 98–107)
CREAT SERPL-MCNC: 0.7 MG/DL (ref 0.51–0.95)
CREAT SERPL-MCNC: 0.73 MG/DL (ref 0.51–0.95)
DEPRECATED HCO3 PLAS-SCNC: 24 MMOL/L (ref 22–29)
DEPRECATED HCO3 PLAS-SCNC: 24 MMOL/L (ref 22–29)
DIASTOLIC BLOOD PRESSURE - MUSE: NORMAL MMHG
EGFRCR SERPLBLD CKD-EPI 2021: 85 ML/MIN/1.73M2
EGFRCR SERPLBLD CKD-EPI 2021: 89 ML/MIN/1.73M2
EOSINOPHIL # BLD AUTO: 0.1 10E3/UL (ref 0–0.7)
EOSINOPHIL # BLD AUTO: 0.1 10E3/UL (ref 0–0.7)
EOSINOPHIL NFR BLD AUTO: 1 %
EOSINOPHIL NFR BLD AUTO: 1 %
ERYTHROCYTE [DISTWIDTH] IN BLOOD BY AUTOMATED COUNT: 15.4 % (ref 10–15)
ERYTHROCYTE [DISTWIDTH] IN BLOOD BY AUTOMATED COUNT: 15.6 % (ref 10–15)
GLUCOSE BLDC GLUCOMTR-MCNC: 101 MG/DL (ref 70–99)
GLUCOSE BLDC GLUCOMTR-MCNC: 114 MG/DL (ref 70–99)
GLUCOSE BLDC GLUCOMTR-MCNC: 203 MG/DL (ref 70–99)
GLUCOSE SERPL-MCNC: 100 MG/DL (ref 70–99)
GLUCOSE SERPL-MCNC: 101 MG/DL (ref 70–99)
HCT VFR BLD AUTO: 30.6 % (ref 35–47)
HCT VFR BLD AUTO: 32 % (ref 35–47)
HGB BLD-MCNC: 10.1 G/DL (ref 11.7–15.7)
HGB BLD-MCNC: 10.3 G/DL (ref 11.7–15.7)
IMM GRANULOCYTES # BLD: 0.1 10E3/UL
IMM GRANULOCYTES # BLD: 0.2 10E3/UL
IMM GRANULOCYTES NFR BLD: 1 %
IMM GRANULOCYTES NFR BLD: 1 %
INTERPRETATION ECG - MUSE: NORMAL
LYMPHOCYTES # BLD AUTO: 0.8 10E3/UL (ref 0.8–5.3)
LYMPHOCYTES # BLD AUTO: 0.9 10E3/UL (ref 0.8–5.3)
LYMPHOCYTES NFR BLD AUTO: 7 %
LYMPHOCYTES NFR BLD AUTO: 9 %
MCH RBC QN AUTO: 29.9 PG (ref 26.5–33)
MCH RBC QN AUTO: 30.1 PG (ref 26.5–33)
MCHC RBC AUTO-ENTMCNC: 32.2 G/DL (ref 31.5–36.5)
MCHC RBC AUTO-ENTMCNC: 33 G/DL (ref 31.5–36.5)
MCV RBC AUTO: 91 FL (ref 78–100)
MCV RBC AUTO: 93 FL (ref 78–100)
MONOCYTES # BLD AUTO: 1 10E3/UL (ref 0–1.3)
MONOCYTES # BLD AUTO: 1.1 10E3/UL (ref 0–1.3)
MONOCYTES NFR BLD AUTO: 10 %
MONOCYTES NFR BLD AUTO: 11 %
NEUTROPHILS # BLD AUTO: 7.4 10E3/UL (ref 1.6–8.3)
NEUTROPHILS # BLD AUTO: 9.6 10E3/UL (ref 1.6–8.3)
NEUTROPHILS NFR BLD AUTO: 78 %
NEUTROPHILS NFR BLD AUTO: 81 %
NRBC # BLD AUTO: 0 10E3/UL
NRBC # BLD AUTO: 0 10E3/UL
NRBC BLD AUTO-RTO: 0 /100
NRBC BLD AUTO-RTO: 0 /100
P AXIS - MUSE: 64 DEGREES
PLATELET # BLD AUTO: 210 10E3/UL (ref 150–450)
PLATELET # BLD AUTO: 233 10E3/UL (ref 150–450)
POTASSIUM SERPL-SCNC: 4.2 MMOL/L (ref 3.4–5.3)
POTASSIUM SERPL-SCNC: 4.3 MMOL/L (ref 3.4–5.3)
PR INTERVAL - MUSE: 150 MS
PROT SERPL-MCNC: 5.6 G/DL (ref 6.4–8.3)
QRS DURATION - MUSE: 84 MS
QT - MUSE: 396 MS
QTC - MUSE: 451 MS
R AXIS - MUSE: 36 DEGREES
RBC # BLD AUTO: 3.35 10E6/UL (ref 3.8–5.2)
RBC # BLD AUTO: 3.44 10E6/UL (ref 3.8–5.2)
SODIUM SERPL-SCNC: 132 MMOL/L (ref 136–145)
SODIUM SERPL-SCNC: 134 MMOL/L (ref 136–145)
SPECIMEN EXPIRATION DATE: NORMAL
SYSTOLIC BLOOD PRESSURE - MUSE: NORMAL MMHG
T AXIS - MUSE: 62 DEGREES
VENTRICULAR RATE- MUSE: 78 BPM
WBC # BLD AUTO: 11.8 10E3/UL (ref 4–11)
WBC # BLD AUTO: 9.4 10E3/UL (ref 4–11)

## 2023-09-21 PROCEDURE — 250N000011 HC RX IP 250 OP 636: Mod: JZ | Performed by: HOSPITALIST

## 2023-09-21 PROCEDURE — C1776 JOINT DEVICE (IMPLANTABLE): HCPCS | Performed by: ORTHOPAEDIC SURGERY

## 2023-09-21 PROCEDURE — 36415 COLL VENOUS BLD VENIPUNCTURE: CPT | Performed by: HOSPITALIST

## 2023-09-21 PROCEDURE — 250N000011 HC RX IP 250 OP 636: Performed by: ANESTHESIOLOGY

## 2023-09-21 PROCEDURE — 85025 COMPLETE CBC W/AUTO DIFF WBC: CPT | Performed by: EMERGENCY MEDICINE

## 2023-09-21 PROCEDURE — 250N000009 HC RX 250: Performed by: ANESTHESIOLOGY

## 2023-09-21 PROCEDURE — 250N000009 HC RX 250: Performed by: ORTHOPAEDIC SURGERY

## 2023-09-21 PROCEDURE — 0SR904Z REPLACEMENT OF RIGHT HIP JOINT WITH CERAMIC ON POLYETHYLENE SYNTHETIC SUBSTITUTE, OPEN APPROACH: ICD-10-PCS | Performed by: ORTHOPAEDIC SURGERY

## 2023-09-21 PROCEDURE — 999N000141 HC STATISTIC PRE-PROCEDURE NURSING ASSESSMENT: Performed by: ORTHOPAEDIC SURGERY

## 2023-09-21 PROCEDURE — 250N000011 HC RX IP 250 OP 636: Mod: JZ | Performed by: NURSE ANESTHETIST, CERTIFIED REGISTERED

## 2023-09-21 PROCEDURE — 250N000013 HC RX MED GY IP 250 OP 250 PS 637: Performed by: HOSPITALIST

## 2023-09-21 PROCEDURE — 258N000001 HC RX 258: Performed by: ORTHOPAEDIC SURGERY

## 2023-09-21 PROCEDURE — 88311 DECALCIFY TISSUE: CPT | Mod: TC | Performed by: ORTHOPAEDIC SURGERY

## 2023-09-21 PROCEDURE — 250N000012 HC RX MED GY IP 250 OP 636 PS 637: Performed by: HOSPITALIST

## 2023-09-21 PROCEDURE — 250N000009 HC RX 250: Performed by: NURSE ANESTHETIST, CERTIFIED REGISTERED

## 2023-09-21 PROCEDURE — 250N000025 HC SEVOFLURANE, PER MIN: Performed by: ORTHOPAEDIC SURGERY

## 2023-09-21 PROCEDURE — 36415 COLL VENOUS BLD VENIPUNCTURE: CPT | Performed by: EMERGENCY MEDICINE

## 2023-09-21 PROCEDURE — 82306 VITAMIN D 25 HYDROXY: CPT | Performed by: PHYSICIAN ASSISTANT

## 2023-09-21 PROCEDURE — 258N000003 HC RX IP 258 OP 636: Performed by: ORTHOPAEDIC SURGERY

## 2023-09-21 PROCEDURE — 258N000003 HC RX IP 258 OP 636: Performed by: NURSE ANESTHETIST, CERTIFIED REGISTERED

## 2023-09-21 PROCEDURE — 999N000063 XR PELVIS AND HIP PORTABLE RIGHT 1 VIEW

## 2023-09-21 PROCEDURE — 82962 GLUCOSE BLOOD TEST: CPT

## 2023-09-21 PROCEDURE — 258N000003 HC RX IP 258 OP 636: Performed by: ANESTHESIOLOGY

## 2023-09-21 PROCEDURE — 86850 RBC ANTIBODY SCREEN: CPT | Performed by: PHYSICIAN ASSISTANT

## 2023-09-21 PROCEDURE — 250N000013 HC RX MED GY IP 250 OP 250 PS 637: Performed by: ORTHOPAEDIC SURGERY

## 2023-09-21 PROCEDURE — 360N000077 HC SURGERY LEVEL 4, PER MIN: Performed by: ORTHOPAEDIC SURGERY

## 2023-09-21 PROCEDURE — 85025 COMPLETE CBC W/AUTO DIFF WBC: CPT | Performed by: HOSPITALIST

## 2023-09-21 PROCEDURE — 80053 COMPREHEN METABOLIC PANEL: CPT | Performed by: EMERGENCY MEDICINE

## 2023-09-21 PROCEDURE — 99232 SBSQ HOSP IP/OBS MODERATE 35: CPT | Performed by: INTERNAL MEDICINE

## 2023-09-21 PROCEDURE — 86901 BLOOD TYPING SEROLOGIC RH(D): CPT | Performed by: PHYSICIAN ASSISTANT

## 2023-09-21 PROCEDURE — 250N000011 HC RX IP 250 OP 636: Mod: JZ | Performed by: INTERNAL MEDICINE

## 2023-09-21 PROCEDURE — C1713 ANCHOR/SCREW BN/BN,TIS/BN: HCPCS | Performed by: ORTHOPAEDIC SURGERY

## 2023-09-21 PROCEDURE — 272N000001 HC OR GENERAL SUPPLY STERILE: Performed by: ORTHOPAEDIC SURGERY

## 2023-09-21 PROCEDURE — 120N000001 HC R&B MED SURG/OB

## 2023-09-21 PROCEDURE — 710N000009 HC RECOVERY PHASE 1, LEVEL 1, PER MIN: Performed by: ORTHOPAEDIC SURGERY

## 2023-09-21 PROCEDURE — 250N000011 HC RX IP 250 OP 636: Mod: JZ | Performed by: ORTHOPAEDIC SURGERY

## 2023-09-21 PROCEDURE — 370N000017 HC ANESTHESIA TECHNICAL FEE, PER MIN: Performed by: ORTHOPAEDIC SURGERY

## 2023-09-21 DEVICE — IMPLANTABLE DEVICE: Type: IMPLANTABLE DEVICE | Site: HIP | Status: FUNCTIONAL

## 2023-09-21 DEVICE — BONE CEMENT SIMPLEX FULL DOSE 6191-1-001: Type: IMPLANTABLE DEVICE | Site: HIP | Status: FUNCTIONAL

## 2023-09-21 DEVICE — IMP HEAD FEM ZIM BIOLOX DELTA CER 36MM  -3.5 00-8775-036-01: Type: IMPLANTABLE DEVICE | Site: HIP | Status: FUNCTIONAL

## 2023-09-21 DEVICE — BUCK FEMORAL CEMENT RESTRICTOR 18.5MM
Type: IMPLANTABLE DEVICE | Site: HIP | Status: FUNCTIONAL
Brand: BUCK

## 2023-09-21 DEVICE — IMPLANTABLE DEVICE
Type: IMPLANTABLE DEVICE | Site: HIP | Status: FUNCTIONAL
Brand: G7 ACETABULAR LINER

## 2023-09-21 DEVICE — IMPLANTABLE DEVICE
Type: IMPLANTABLE DEVICE | Site: HIP | Status: FUNCTIONAL
Brand: G7® ACETABULAR SYSTEM

## 2023-09-21 DEVICE — IMP SCR ZIM 6.5X35MM ACET CUP SELF TAP 00-6250-065-35: Type: IMPLANTABLE DEVICE | Site: HIP | Status: FUNCTIONAL

## 2023-09-21 DEVICE — TOBRA FULL DOSE ANTIBIOTIC BONE CEMENT, 10 PACK CATALOG NUMBER IS 6197-9-010
Type: IMPLANTABLE DEVICE | Site: HIP | Status: FUNCTIONAL
Brand: SIMPLEX

## 2023-09-21 RX ORDER — PROPOFOL 10 MG/ML
INJECTION, EMULSION INTRAVENOUS CONTINUOUS PRN
Status: DISCONTINUED | OUTPATIENT
Start: 2023-09-21 | End: 2023-09-21

## 2023-09-21 RX ORDER — CEFAZOLIN SODIUM/WATER 2 G/20 ML
SYRINGE (ML) INTRAVENOUS PRN
Status: DISCONTINUED | OUTPATIENT
Start: 2023-09-21 | End: 2023-09-21

## 2023-09-21 RX ORDER — ONDANSETRON 2 MG/ML
4 INJECTION INTRAMUSCULAR; INTRAVENOUS EVERY 6 HOURS PRN
Status: CANCELLED | OUTPATIENT
Start: 2023-09-21

## 2023-09-21 RX ORDER — IBUPROFEN 600 MG/1
600 TABLET, FILM COATED ORAL EVERY 4 HOURS PRN
Status: CANCELLED | OUTPATIENT
Start: 2023-09-21

## 2023-09-21 RX ORDER — LIDOCAINE 40 MG/G
CREAM TOPICAL
Status: DISCONTINUED | OUTPATIENT
Start: 2023-09-21 | End: 2023-09-24 | Stop reason: HOSPADM

## 2023-09-21 RX ORDER — PROPOFOL 10 MG/ML
INJECTION, EMULSION INTRAVENOUS PRN
Status: DISCONTINUED | OUTPATIENT
Start: 2023-09-21 | End: 2023-09-21

## 2023-09-21 RX ORDER — POLYETHYLENE GLYCOL 3350 17 G/17G
17 POWDER, FOR SOLUTION ORAL DAILY
Status: CANCELLED | OUTPATIENT
Start: 2023-09-22

## 2023-09-21 RX ORDER — ACETAMINOPHEN 650 MG/1
650 SUPPOSITORY RECTAL EVERY 6 HOURS PRN
Status: DISCONTINUED | OUTPATIENT
Start: 2023-09-21 | End: 2023-09-24 | Stop reason: HOSPADM

## 2023-09-21 RX ORDER — POLYETHYLENE GLYCOL 3350 17 G/17G
17 POWDER, FOR SOLUTION ORAL DAILY
Status: DISCONTINUED | OUTPATIENT
Start: 2023-09-22 | End: 2023-09-23

## 2023-09-21 RX ORDER — ACETAMINOPHEN 325 MG/1
975 TABLET ORAL EVERY 8 HOURS
Status: CANCELLED | OUTPATIENT
Start: 2023-09-21 | End: 2023-09-24

## 2023-09-21 RX ORDER — LIDOCAINE 40 MG/G
CREAM TOPICAL
Status: CANCELLED | OUTPATIENT
Start: 2023-09-21

## 2023-09-21 RX ORDER — CEFAZOLIN SODIUM 2 G/100ML
2 INJECTION, SOLUTION INTRAVENOUS SEE ADMIN INSTRUCTIONS
Status: CANCELLED | OUTPATIENT
Start: 2023-09-21

## 2023-09-21 RX ORDER — NALOXONE HYDROCHLORIDE 0.4 MG/ML
0.4 INJECTION, SOLUTION INTRAMUSCULAR; INTRAVENOUS; SUBCUTANEOUS
Status: DISCONTINUED | OUTPATIENT
Start: 2023-09-21 | End: 2023-09-24 | Stop reason: HOSPADM

## 2023-09-21 RX ORDER — OXYCODONE HYDROCHLORIDE 5 MG/1
10 TABLET ORAL EVERY 4 HOURS PRN
Status: CANCELLED | OUTPATIENT
Start: 2023-09-21

## 2023-09-21 RX ORDER — TEMAZEPAM 15 MG/1
15 CAPSULE ORAL AT BEDTIME
Status: DISCONTINUED | OUTPATIENT
Start: 2023-09-21 | End: 2023-09-24 | Stop reason: HOSPADM

## 2023-09-21 RX ORDER — OXYCODONE HYDROCHLORIDE 5 MG/1
5 TABLET ORAL EVERY 4 HOURS PRN
Status: CANCELLED | OUTPATIENT
Start: 2023-09-21

## 2023-09-21 RX ORDER — NALOXONE HYDROCHLORIDE 0.4 MG/ML
0.2 INJECTION, SOLUTION INTRAMUSCULAR; INTRAVENOUS; SUBCUTANEOUS
Status: DISCONTINUED | OUTPATIENT
Start: 2023-09-21 | End: 2023-09-24 | Stop reason: HOSPADM

## 2023-09-21 RX ORDER — SODIUM CHLORIDE, SODIUM LACTATE, POTASSIUM CHLORIDE, CALCIUM CHLORIDE 600; 310; 30; 20 MG/100ML; MG/100ML; MG/100ML; MG/100ML
INJECTION, SOLUTION INTRAVENOUS CONTINUOUS
Status: CANCELLED | OUTPATIENT
Start: 2023-09-21

## 2023-09-21 RX ORDER — HYDROMORPHONE HCL IN WATER/PF 6 MG/30 ML
0.4 PATIENT CONTROLLED ANALGESIA SYRINGE INTRAVENOUS EVERY 5 MIN PRN
Status: DISCONTINUED | OUTPATIENT
Start: 2023-09-21 | End: 2023-09-21 | Stop reason: HOSPADM

## 2023-09-21 RX ORDER — MAGNESIUM HYDROXIDE 1200 MG/15ML
LIQUID ORAL PRN
Status: DISCONTINUED | OUTPATIENT
Start: 2023-09-21 | End: 2023-09-21 | Stop reason: HOSPADM

## 2023-09-21 RX ORDER — ONDANSETRON 2 MG/ML
4 INJECTION INTRAMUSCULAR; INTRAVENOUS EVERY 6 HOURS PRN
Status: DISCONTINUED | OUTPATIENT
Start: 2023-09-21 | End: 2023-09-24 | Stop reason: HOSPADM

## 2023-09-21 RX ORDER — HYDROMORPHONE HCL IN WATER/PF 6 MG/30 ML
0.2 PATIENT CONTROLLED ANALGESIA SYRINGE INTRAVENOUS
Status: DISCONTINUED | OUTPATIENT
Start: 2023-09-21 | End: 2023-09-21

## 2023-09-21 RX ORDER — METOPROLOL SUCCINATE 100 MG/1
100 TABLET, EXTENDED RELEASE ORAL DAILY
Status: DISCONTINUED | OUTPATIENT
Start: 2023-09-21 | End: 2023-09-24 | Stop reason: HOSPADM

## 2023-09-21 RX ORDER — OXYCODONE HYDROCHLORIDE 5 MG/1
5 TABLET ORAL EVERY 4 HOURS PRN
Status: DISCONTINUED | OUTPATIENT
Start: 2023-09-21 | End: 2023-09-22

## 2023-09-21 RX ORDER — ASPIRIN 325 MG
325 TABLET, DELAYED RELEASE (ENTERIC COATED) ORAL DAILY
Status: CANCELLED | OUTPATIENT
Start: 2023-09-21

## 2023-09-21 RX ORDER — NICOTINE POLACRILEX 4 MG
15-30 LOZENGE BUCCAL
Status: DISCONTINUED | OUTPATIENT
Start: 2023-09-21 | End: 2023-09-22

## 2023-09-21 RX ORDER — CEFAZOLIN SODIUM 1 G/3ML
1 INJECTION, POWDER, FOR SOLUTION INTRAMUSCULAR; INTRAVENOUS EVERY 8 HOURS
Status: CANCELLED | OUTPATIENT
Start: 2023-09-21 | End: 2023-09-22

## 2023-09-21 RX ORDER — VASOPRESSIN IN 0.9 % NACL 2 UNIT/2ML
SYRINGE (ML) INTRAVENOUS PRN
Status: DISCONTINUED | OUTPATIENT
Start: 2023-09-21 | End: 2023-09-21

## 2023-09-21 RX ORDER — ACETAMINOPHEN 325 MG/1
650 TABLET ORAL EVERY 4 HOURS PRN
Status: DISCONTINUED | OUTPATIENT
Start: 2023-09-24 | End: 2023-09-21

## 2023-09-21 RX ORDER — AMOXICILLIN 250 MG
1 CAPSULE ORAL 2 TIMES DAILY
Status: CANCELLED | OUTPATIENT
Start: 2023-09-21

## 2023-09-21 RX ORDER — LORAZEPAM 0.5 MG/1
0.25 TABLET ORAL DAILY PRN
Status: DISCONTINUED | OUTPATIENT
Start: 2023-09-21 | End: 2023-09-24 | Stop reason: HOSPADM

## 2023-09-21 RX ORDER — SODIUM CHLORIDE, SODIUM LACTATE, POTASSIUM CHLORIDE, CALCIUM CHLORIDE 600; 310; 30; 20 MG/100ML; MG/100ML; MG/100ML; MG/100ML
INJECTION, SOLUTION INTRAVENOUS CONTINUOUS
Status: DISCONTINUED | OUTPATIENT
Start: 2023-09-21 | End: 2023-09-21 | Stop reason: HOSPADM

## 2023-09-21 RX ORDER — HYDROXYZINE HYDROCHLORIDE 10 MG/1
10 TABLET, FILM COATED ORAL EVERY 6 HOURS PRN
Status: CANCELLED | OUTPATIENT
Start: 2023-09-21

## 2023-09-21 RX ORDER — HYDROMORPHONE HCL IN WATER/PF 6 MG/30 ML
0.4 PATIENT CONTROLLED ANALGESIA SYRINGE INTRAVENOUS
Status: CANCELLED | OUTPATIENT
Start: 2023-09-21

## 2023-09-21 RX ORDER — CEFAZOLIN SODIUM 1 G/3ML
1 INJECTION, POWDER, FOR SOLUTION INTRAMUSCULAR; INTRAVENOUS EVERY 8 HOURS
Status: COMPLETED | OUTPATIENT
Start: 2023-09-22 | End: 2023-09-22

## 2023-09-21 RX ORDER — LIDOCAINE HYDROCHLORIDE 20 MG/ML
INJECTION, SOLUTION INFILTRATION; PERINEURAL PRN
Status: DISCONTINUED | OUTPATIENT
Start: 2023-09-21 | End: 2023-09-21

## 2023-09-21 RX ORDER — HYDROMORPHONE HCL IN WATER/PF 6 MG/30 ML
0.2 PATIENT CONTROLLED ANALGESIA SYRINGE INTRAVENOUS
Status: DISCONTINUED | OUTPATIENT
Start: 2023-09-21 | End: 2023-09-24 | Stop reason: HOSPADM

## 2023-09-21 RX ORDER — VITAMIN B COMPLEX
50 TABLET ORAL DAILY
Status: DISCONTINUED | OUTPATIENT
Start: 2023-09-22 | End: 2023-09-24 | Stop reason: HOSPADM

## 2023-09-21 RX ORDER — ONDANSETRON 2 MG/ML
INJECTION INTRAMUSCULAR; INTRAVENOUS PRN
Status: DISCONTINUED | OUTPATIENT
Start: 2023-09-21 | End: 2023-09-21

## 2023-09-21 RX ORDER — AMOXICILLIN 250 MG
2 CAPSULE ORAL 2 TIMES DAILY PRN
Status: DISCONTINUED | OUTPATIENT
Start: 2023-09-21 | End: 2023-09-24 | Stop reason: HOSPADM

## 2023-09-21 RX ORDER — AMOXICILLIN 250 MG
1 CAPSULE ORAL 2 TIMES DAILY
Status: DISCONTINUED | OUTPATIENT
Start: 2023-09-21 | End: 2023-09-24 | Stop reason: HOSPADM

## 2023-09-21 RX ORDER — ONDANSETRON 4 MG/1
4 TABLET, ORALLY DISINTEGRATING ORAL EVERY 6 HOURS PRN
Status: CANCELLED | OUTPATIENT
Start: 2023-09-21

## 2023-09-21 RX ORDER — DEXMEDETOMIDINE HYDROCHLORIDE 4 UG/ML
INJECTION, SOLUTION INTRAVENOUS PRN
Status: DISCONTINUED | OUTPATIENT
Start: 2023-09-21 | End: 2023-09-21

## 2023-09-21 RX ORDER — LIDOCAINE 40 MG/G
CREAM TOPICAL
Status: DISCONTINUED | OUTPATIENT
Start: 2023-09-21 | End: 2023-09-21 | Stop reason: HOSPADM

## 2023-09-21 RX ORDER — HYDROMORPHONE HCL IN WATER/PF 6 MG/30 ML
0.2 PATIENT CONTROLLED ANALGESIA SYRINGE INTRAVENOUS EVERY 5 MIN PRN
Status: DISCONTINUED | OUTPATIENT
Start: 2023-09-21 | End: 2023-09-21 | Stop reason: HOSPADM

## 2023-09-21 RX ORDER — LANOLIN ALCOHOL/MO/W.PET/CERES
3 CREAM (GRAM) TOPICAL
Status: DISCONTINUED | OUTPATIENT
Start: 2023-09-21 | End: 2023-09-24 | Stop reason: HOSPADM

## 2023-09-21 RX ORDER — IBUPROFEN 600 MG/1
600 TABLET, FILM COATED ORAL EVERY 4 HOURS PRN
Status: DISCONTINUED | OUTPATIENT
Start: 2023-09-21 | End: 2023-09-24 | Stop reason: HOSPADM

## 2023-09-21 RX ORDER — TRANEXAMIC ACID 10 MG/ML
1 INJECTION, SOLUTION INTRAVENOUS ONCE
Status: DISCONTINUED | OUTPATIENT
Start: 2023-09-21 | End: 2023-09-21 | Stop reason: HOSPADM

## 2023-09-21 RX ORDER — BISACODYL 10 MG
10 SUPPOSITORY, RECTAL RECTAL DAILY PRN
Status: CANCELLED | OUTPATIENT
Start: 2023-09-21

## 2023-09-21 RX ORDER — FENTANYL CITRATE 50 UG/ML
25 INJECTION, SOLUTION INTRAMUSCULAR; INTRAVENOUS EVERY 5 MIN PRN
Status: DISCONTINUED | OUTPATIENT
Start: 2023-09-21 | End: 2023-09-21 | Stop reason: HOSPADM

## 2023-09-21 RX ORDER — PROCHLORPERAZINE MALEATE 5 MG
5 TABLET ORAL EVERY 6 HOURS PRN
Status: CANCELLED | OUTPATIENT
Start: 2023-09-21

## 2023-09-21 RX ORDER — ONDANSETRON 4 MG/1
4 TABLET, ORALLY DISINTEGRATING ORAL EVERY 6 HOURS PRN
Status: DISCONTINUED | OUTPATIENT
Start: 2023-09-21 | End: 2023-09-21

## 2023-09-21 RX ORDER — ONDANSETRON 2 MG/ML
4 INJECTION INTRAMUSCULAR; INTRAVENOUS EVERY 30 MIN PRN
Status: DISCONTINUED | OUTPATIENT
Start: 2023-09-21 | End: 2023-09-21 | Stop reason: HOSPADM

## 2023-09-21 RX ORDER — CEFAZOLIN SODIUM 2 G/100ML
2 INJECTION, SOLUTION INTRAVENOUS
Status: CANCELLED | OUTPATIENT
Start: 2023-09-21

## 2023-09-21 RX ORDER — ONDANSETRON 4 MG/1
4 TABLET, ORALLY DISINTEGRATING ORAL EVERY 30 MIN PRN
Status: DISCONTINUED | OUTPATIENT
Start: 2023-09-21 | End: 2023-09-21 | Stop reason: HOSPADM

## 2023-09-21 RX ORDER — ACETAMINOPHEN 325 MG/1
650 TABLET ORAL EVERY 4 HOURS PRN
Status: CANCELLED | OUTPATIENT
Start: 2023-09-24

## 2023-09-21 RX ORDER — OXYCODONE HYDROCHLORIDE 5 MG/1
5 TABLET ORAL EVERY 4 HOURS PRN
Status: DISCONTINUED | OUTPATIENT
Start: 2023-09-21 | End: 2023-09-24 | Stop reason: HOSPADM

## 2023-09-21 RX ORDER — TRANEXAMIC ACID 10 MG/ML
1 INJECTION, SOLUTION INTRAVENOUS ONCE
Status: CANCELLED | OUTPATIENT
Start: 2023-09-21 | End: 2023-09-21

## 2023-09-21 RX ORDER — FENTANYL CITRATE 0.05 MG/ML
25-100 INJECTION, SOLUTION INTRAMUSCULAR; INTRAVENOUS
Status: DISCONTINUED | OUTPATIENT
Start: 2023-09-21 | End: 2023-09-21 | Stop reason: HOSPADM

## 2023-09-21 RX ORDER — PROCHLORPERAZINE MALEATE 5 MG
5 TABLET ORAL EVERY 6 HOURS PRN
Status: DISCONTINUED | OUTPATIENT
Start: 2023-09-21 | End: 2023-09-24 | Stop reason: HOSPADM

## 2023-09-21 RX ORDER — LISINOPRIL 10 MG/1
10 TABLET ORAL DAILY
Status: DISCONTINUED | OUTPATIENT
Start: 2023-09-21 | End: 2023-09-23

## 2023-09-21 RX ORDER — ASPIRIN 325 MG
325 TABLET, DELAYED RELEASE (ENTERIC COATED) ORAL DAILY
Status: DISCONTINUED | OUTPATIENT
Start: 2023-09-21 | End: 2023-09-24 | Stop reason: HOSPADM

## 2023-09-21 RX ORDER — ACETAMINOPHEN 325 MG/1
650 TABLET ORAL EVERY 6 HOURS PRN
Status: DISCONTINUED | OUTPATIENT
Start: 2023-09-21 | End: 2023-09-24 | Stop reason: HOSPADM

## 2023-09-21 RX ORDER — OXYCODONE HYDROCHLORIDE 5 MG/1
10 TABLET ORAL EVERY 4 HOURS PRN
Status: DISCONTINUED | OUTPATIENT
Start: 2023-09-21 | End: 2023-09-24 | Stop reason: HOSPADM

## 2023-09-21 RX ORDER — FENTANYL CITRATE 50 UG/ML
INJECTION, SOLUTION INTRAMUSCULAR; INTRAVENOUS PRN
Status: DISCONTINUED | OUTPATIENT
Start: 2023-09-21 | End: 2023-09-21

## 2023-09-21 RX ORDER — ACETAMINOPHEN 325 MG/1
975 TABLET ORAL EVERY 8 HOURS
Status: DISCONTINUED | OUTPATIENT
Start: 2023-09-21 | End: 2023-09-24 | Stop reason: HOSPADM

## 2023-09-21 RX ORDER — DEXTROSE MONOHYDRATE 25 G/50ML
25-50 INJECTION, SOLUTION INTRAVENOUS
Status: DISCONTINUED | OUTPATIENT
Start: 2023-09-21 | End: 2023-09-22

## 2023-09-21 RX ORDER — ONDANSETRON 4 MG/1
4 TABLET, ORALLY DISINTEGRATING ORAL EVERY 6 HOURS PRN
Status: DISCONTINUED | OUTPATIENT
Start: 2023-09-21 | End: 2023-09-24 | Stop reason: HOSPADM

## 2023-09-21 RX ORDER — POLYETHYLENE GLYCOL 3350 17 G/17G
17 POWDER, FOR SOLUTION ORAL DAILY PRN
Status: DISCONTINUED | OUTPATIENT
Start: 2023-09-21 | End: 2023-09-24 | Stop reason: HOSPADM

## 2023-09-21 RX ORDER — AMOXICILLIN 250 MG
2 CAPSULE ORAL 3 TIMES DAILY
Status: DISCONTINUED | OUTPATIENT
Start: 2023-09-21 | End: 2023-09-24 | Stop reason: HOSPADM

## 2023-09-21 RX ORDER — CEFAZOLIN SODIUM/WATER 2 G/20 ML
2 SYRINGE (ML) INTRAVENOUS SEE ADMIN INSTRUCTIONS
Status: DISCONTINUED | OUTPATIENT
Start: 2023-09-21 | End: 2023-09-21 | Stop reason: HOSPADM

## 2023-09-21 RX ORDER — HYDROMORPHONE HCL IN WATER/PF 6 MG/30 ML
0.2 PATIENT CONTROLLED ANALGESIA SYRINGE INTRAVENOUS
Status: CANCELLED | OUTPATIENT
Start: 2023-09-21

## 2023-09-21 RX ORDER — CEFAZOLIN SODIUM/WATER 2 G/20 ML
2 SYRINGE (ML) INTRAVENOUS
Status: DISCONTINUED | OUTPATIENT
Start: 2023-09-21 | End: 2023-09-21 | Stop reason: HOSPADM

## 2023-09-21 RX ORDER — ONDANSETRON 2 MG/ML
4 INJECTION INTRAMUSCULAR; INTRAVENOUS EVERY 6 HOURS PRN
Status: DISCONTINUED | OUTPATIENT
Start: 2023-09-21 | End: 2023-09-21

## 2023-09-21 RX ORDER — VANCOMYCIN HYDROCHLORIDE 1 G/20ML
INJECTION, POWDER, LYOPHILIZED, FOR SOLUTION INTRAVENOUS PRN
Status: DISCONTINUED | OUTPATIENT
Start: 2023-09-21 | End: 2023-09-21 | Stop reason: HOSPADM

## 2023-09-21 RX ORDER — FENTANYL CITRATE 50 UG/ML
50 INJECTION, SOLUTION INTRAMUSCULAR; INTRAVENOUS EVERY 5 MIN PRN
Status: DISCONTINUED | OUTPATIENT
Start: 2023-09-21 | End: 2023-09-21 | Stop reason: HOSPADM

## 2023-09-21 RX ORDER — OXYCODONE HYDROCHLORIDE 5 MG/1
10 TABLET ORAL EVERY 4 HOURS PRN
Status: DISCONTINUED | OUTPATIENT
Start: 2023-09-21 | End: 2023-09-22

## 2023-09-21 RX ORDER — SODIUM CHLORIDE, SODIUM LACTATE, POTASSIUM CHLORIDE, CALCIUM CHLORIDE 600; 310; 30; 20 MG/100ML; MG/100ML; MG/100ML; MG/100ML
INJECTION, SOLUTION INTRAVENOUS CONTINUOUS
Status: DISCONTINUED | OUTPATIENT
Start: 2023-09-21 | End: 2023-09-24

## 2023-09-21 RX ORDER — HYDROMORPHONE HCL IN WATER/PF 6 MG/30 ML
0.4 PATIENT CONTROLLED ANALGESIA SYRINGE INTRAVENOUS
Status: DISCONTINUED | OUTPATIENT
Start: 2023-09-21 | End: 2023-09-24 | Stop reason: HOSPADM

## 2023-09-21 RX ORDER — BISACODYL 10 MG
10 SUPPOSITORY, RECTAL RECTAL DAILY PRN
Status: DISCONTINUED | OUTPATIENT
Start: 2023-09-21 | End: 2023-09-23

## 2023-09-21 RX ORDER — AMOXICILLIN 250 MG
1 CAPSULE ORAL 2 TIMES DAILY PRN
Status: DISCONTINUED | OUTPATIENT
Start: 2023-09-21 | End: 2023-09-24 | Stop reason: HOSPADM

## 2023-09-21 RX ORDER — HYDROMORPHONE HCL IN WATER/PF 6 MG/30 ML
0.4 PATIENT CONTROLLED ANALGESIA SYRINGE INTRAVENOUS
Status: DISCONTINUED | OUTPATIENT
Start: 2023-09-21 | End: 2023-09-21

## 2023-09-21 RX ORDER — DEXAMETHASONE SODIUM PHOSPHATE 4 MG/ML
INJECTION, SOLUTION INTRA-ARTICULAR; INTRALESIONAL; INTRAMUSCULAR; INTRAVENOUS; SOFT TISSUE PRN
Status: DISCONTINUED | OUTPATIENT
Start: 2023-09-21 | End: 2023-09-21

## 2023-09-21 RX ADMIN — PHENYLEPHRINE HYDROCHLORIDE 150 MCG: 10 INJECTION INTRAVENOUS at 17:00

## 2023-09-21 RX ADMIN — HYDROMORPHONE HYDROCHLORIDE 0.4 MG: 0.2 INJECTION, SOLUTION INTRAMUSCULAR; INTRAVENOUS; SUBCUTANEOUS at 04:28

## 2023-09-21 RX ADMIN — DEXAMETHASONE SODIUM PHOSPHATE 4 MG: 4 INJECTION, SOLUTION INTRA-ARTICULAR; INTRALESIONAL; INTRAMUSCULAR; INTRAVENOUS; SOFT TISSUE at 16:35

## 2023-09-21 RX ADMIN — Medication 0.5 UNITS: at 17:07

## 2023-09-21 RX ADMIN — Medication 20 ML: at 15:15

## 2023-09-21 RX ADMIN — DEXMEDETOMIDINE HYDROCHLORIDE 8 MCG: 200 INJECTION INTRAVENOUS at 17:56

## 2023-09-21 RX ADMIN — FENTANYL CITRATE 50 MCG: 50 INJECTION, SOLUTION INTRAMUSCULAR; INTRAVENOUS at 15:05

## 2023-09-21 RX ADMIN — LIDOCAINE HYDROCHLORIDE 40 MG: 20 INJECTION, SOLUTION INFILTRATION; PERINEURAL at 16:26

## 2023-09-21 RX ADMIN — ACETAMINOPHEN 975 MG: 325 TABLET, FILM COATED ORAL at 21:09

## 2023-09-21 RX ADMIN — FENTANYL CITRATE 50 MCG: 50 INJECTION, SOLUTION INTRAMUSCULAR; INTRAVENOUS at 16:44

## 2023-09-21 RX ADMIN — SENNOSIDES AND DOCUSATE SODIUM 1 TABLET: 50; 8.6 TABLET ORAL at 21:08

## 2023-09-21 RX ADMIN — TEMAZEPAM 15 MG: 15 CAPSULE ORAL at 21:08

## 2023-09-21 RX ADMIN — OXYCODONE HYDROCHLORIDE 5 MG: 5 TABLET ORAL at 07:42

## 2023-09-21 RX ADMIN — PHENYLEPHRINE HYDROCHLORIDE 100 MCG: 10 INJECTION INTRAVENOUS at 16:55

## 2023-09-21 RX ADMIN — OXYCODONE HYDROCHLORIDE 5 MG: 5 TABLET ORAL at 01:19

## 2023-09-21 RX ADMIN — ONDANSETRON 4 MG: 2 INJECTION INTRAMUSCULAR; INTRAVENOUS at 17:51

## 2023-09-21 RX ADMIN — FENTANYL CITRATE 50 MCG: 50 INJECTION, SOLUTION INTRAMUSCULAR; INTRAVENOUS at 16:26

## 2023-09-21 RX ADMIN — LISINOPRIL 10 MG: 10 TABLET ORAL at 07:41

## 2023-09-21 RX ADMIN — MIDAZOLAM 2 MG: 1 INJECTION INTRAMUSCULAR; INTRAVENOUS at 16:23

## 2023-09-21 RX ADMIN — OXYCODONE HYDROCHLORIDE 5 MG: 5 TABLET ORAL at 13:16

## 2023-09-21 RX ADMIN — PHENYLEPHRINE HYDROCHLORIDE 0.5 MCG/KG/MIN: 10 INJECTION INTRAVENOUS at 17:05

## 2023-09-21 RX ADMIN — FENTANYL CITRATE 50 MCG: 50 INJECTION, SOLUTION INTRAMUSCULAR; INTRAVENOUS at 15:10

## 2023-09-21 RX ADMIN — PROPOFOL 100 MG: 10 INJECTION, EMULSION INTRAVENOUS at 16:26

## 2023-09-21 RX ADMIN — PHENYLEPHRINE HYDROCHLORIDE 150 MCG: 10 INJECTION INTRAVENOUS at 17:04

## 2023-09-21 RX ADMIN — LORAZEPAM 0.25 MG: 0.5 TABLET ORAL at 05:21

## 2023-09-21 RX ADMIN — AMITRIPTYLINE HYDROCHLORIDE 25 MG: 25 TABLET, FILM COATED ORAL at 21:08

## 2023-09-21 RX ADMIN — PROPOFOL 30 MCG/KG/MIN: 10 INJECTION, EMULSION INTRAVENOUS at 16:40

## 2023-09-21 RX ADMIN — PREDNISONE 8 MG: 2.5 TABLET ORAL at 07:41

## 2023-09-21 RX ADMIN — HYDROCORTISONE SODIUM SUCCINATE 25 MG: 100 INJECTION, POWDER, FOR SOLUTION INTRAMUSCULAR; INTRAVENOUS at 13:51

## 2023-09-21 RX ADMIN — ACETAMINOPHEN 650 MG: 325 TABLET, FILM COATED ORAL at 04:28

## 2023-09-21 RX ADMIN — SODIUM CHLORIDE, POTASSIUM CHLORIDE, SODIUM LACTATE AND CALCIUM CHLORIDE: 600; 310; 30; 20 INJECTION, SOLUTION INTRAVENOUS at 16:21

## 2023-09-21 RX ADMIN — DEXMEDETOMIDINE HYDROCHLORIDE 12 MCG: 200 INJECTION INTRAVENOUS at 16:46

## 2023-09-21 RX ADMIN — Medication 2 G: at 16:35

## 2023-09-21 RX ADMIN — HYDROMORPHONE HYDROCHLORIDE 0.2 MG: 0.2 INJECTION, SOLUTION INTRAMUSCULAR; INTRAVENOUS; SUBCUTANEOUS at 02:04

## 2023-09-21 RX ADMIN — ASPIRIN 325 MG: 325 TABLET, COATED ORAL at 21:09

## 2023-09-21 ASSESSMENT — ACTIVITIES OF DAILY LIVING (ADL)
ADLS_ACUITY_SCORE: 44
ADLS_ACUITY_SCORE: 44
TOILETING: 1-->ASSISTANCE (EQUIPMENT/PERSON) NEEDED
TOILETING: 1-->ASSISTANCE (EQUIPMENT/PERSON) NEEDED (NOT DEVELOPMENTALLY APPROPRIATE)
DOING_ERRANDS_INDEPENDENTLY_DIFFICULTY: YES
DRESS: 1-->ASSISTANCE (EQUIPMENT/PERSON) NEEDED (NOT DEVELOPMENTALLY APPROPRIATE)
ADLS_ACUITY_SCORE: 37
ADLS_ACUITY_SCORE: 44
ADLS_ACUITY_SCORE: 44
DEPENDENT_IADLS:: TRANSPORTATION;SHOPPING
ADLS_ACUITY_SCORE: 37
FALL_HISTORY_WITHIN_LAST_SIX_MONTHS: NO
DRESS: 1-->ASSISTANCE (EQUIPMENT/PERSON) NEEDED
DRESSING/BATHING: BATHING DIFFICULTY, REQUIRES EQUIPMENT;DRESSING DIFFICULTY, ASSISTANCE 1 PERSON
ADLS_ACUITY_SCORE: 44
ADLS_ACUITY_SCORE: 44
CHANGE_IN_FUNCTIONAL_STATUS_SINCE_ONSET_OF_CURRENT_ILLNESS/INJURY: YES
ADLS_ACUITY_SCORE: 44
TOILETING_ISSUES: YES
ADLS_ACUITY_SCORE: 44
ADLS_ACUITY_SCORE: 44
DRESSING/BATHING_DIFFICULTY: YES
BATHING: 1-->ASSISTANCE NEEDED
TOILETING_ASSISTANCE: TOILETING DIFFICULTY, REQUIRES EQUIPMENT
ADLS_ACUITY_SCORE: 44

## 2023-09-21 ASSESSMENT — ENCOUNTER SYMPTOMS: SEIZURES: 0

## 2023-09-21 ASSESSMENT — LIFESTYLE VARIABLES: TOBACCO_USE: 0

## 2023-09-21 NOTE — CONSULTS
Care Management Initial Consult    General Information  Assessment completed with: Patient,    Type of CM/SW Visit: Initial Assessment    Primary Care Provider verified and updated as needed:     Readmission within the last 30 days: no previous admission in last 30 days      Reason for Consult: discharge planning  Advance Care Planning: Advance Care Planning Reviewed: no concerns identified        Communication Assessment  Patient's communication style: spoken language (English or Bilingual)    Hearing Difficulty or Deaf: no   Wear Glasses or Blind: yes    Cognitive  Cognitive/Neuro/Behavioral: WDL                      Living Environment:   People in home: alone     Current living Arrangements: other (see comments) (Hotel in [place])  Name of Facility: [facility]   Able to return to prior arrangements: yes     Family/Social Support:  Care provided by: self  Provides care for: no one  Marital Status:   Children, Other (specify) (friend Julio)          Description of Support System: Supportive, Involved    Support Assessment: Adequate family and caregiver support, Adequate social supports    Current Resources:   Patient receiving home care services: No     Community Resources: None  Equipment currently used at home: walker, standard, walker, yaima  Supplies currently used at home: None    Employment/Financial:  Employment Status: employed part-time        Financial Concerns: No concerns identified   Referral to Financial Worker: No     Does the patient's insurance plan have a 3 day qualifying hospital stay waiver?  No    Lifestyle & Psychosocial Needs:  Social Determinants of Health     Food Insecurity: Not on file   Depression: Not on file   Housing Stability: Not on file   Tobacco Use: Medium Risk (9/20/2023)    Patient History     Smoking Tobacco Use: Former     Smokeless Tobacco Use: Never     Passive Exposure: Not on file   Financial Resource Strain: Not on file   Alcohol Use: Not At Risk (1/29/2019)     AUDIT-C     Frequency of Alcohol Consumption: Never     Average Number of Drinks: Not on file     Frequency of Binge Drinking: Not on file   Transportation Needs: Not on file   Physical Activity: Not on file   Interpersonal Safety: Not on file   Stress: Not on file   Social Connections: Not on file       Functional Status:  Prior to admission patient needed assistance:   Dependent ADLs:: Ambulation-walker  Dependent IADLs:: Transportation, Shopping     Mental Health Status:  Mental Health Status: No Current Concerns       Chemical Dependency Status:  Chemical Dependency Status: No Current Concerns           Values/Beliefs:  Spiritual, Cultural Beliefs, Hindu Practices, Values that affect care: no             Additional Information:  SW consulted for discharge planning and completed chart review. Per ED Provider notes, patient presented to the ED for worsening right hip pain and inability to ambulate after a near fall. Pain has been ongoing for the past four weeks. SW met with patient to introduce themselves and explain their role with discharge planning. Patient comes from [facility], where she owns a condo and resides mostly full time. On Labor day, she and a friend flew back to [place] for her medical care. While here, she either stays with her friend in [place] or her daughter Ledy in [place]. Unexpectedly, her health started to decline and her mobility changed, so she has been staying in a [facility] on [address] where they have wheelchairs, walkers, and elevators. 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 Because of this, she is very familiar with TCU facilities and where she would prefer if she were to go to TCU ([facility]). Discussed Medicare coverage of TCU. She is declining a TCU list of locations at this time. Patient reports that she has a lot to figure  out with discharge and next steps. She may return back to the hotel with her friend Julio helping her and [facility]. Or, she would consider a TCU. She is on a wait list for independent living here as well. Social work will continue to follow along to arrange discharge needs.     Note, patient's current address in [place] [facility] is a house that she used to own years ago and has sold. It is unclear which address she would like to add to replace it for mailing/billing.     ARTUR Appiah, LGSW   Social Work   [facility]

## 2023-09-21 NOTE — PROGRESS NOTES
RECEIVING UNIT ED HANDOFF REVIEW    ED Nurse Handoff Report was reviewed by: Cheli Barclay RN on September 21, 2023 at 1:48 AM

## 2023-09-21 NOTE — H&P
[facility]    History and Physical  Hospitalist       Date of Admission:  9/20/2023  Date of Service (when I saw the patient): 09/20/23    ASSESSMENT  Sixto Jimenez is a markedly pleasant 78 year old woman with past medical history that is most notable for chronic Prednisone use for PMR as well as osteoporosis and chronic back pain, among others; who presents with acute on chronic pain in the right hip, knee and back and is found to have an acute, displaced subcapital fracture of the right femoral neck.    PLAN     Acute, displaced subcapital fracture of the right femoral neck: Of note, Ms. Jimenez takes daily Prednisone for presumed PMR and is currently on a prolonged taper; now at 8 mg daily. The therapy began in 4/2023. She also has osteoporosis: her most recent DEXA scan in 8/2023 was most notable for reportedly -2.3 T score in the left femoral neck. The right femoral neck was -1.8 and the right total hip was -1.2. In addition she has a remote history of colon cancer, status post resection with several cycles of chemotherapy in 2010. A surveillance colonoscopy was done in 4/2022, as well as CT scan, and were reportedly negative for recurrence.    Now she presents for acute on chronic pain in the right hip, knee and back. In the ED, she is afebrile. She has accelerated hypertension without other signs of hypertensive crisis. She is not hypoxic. X-rays of the pelvis and right femur reveal an acute, displaced subcapital fracture of the right femoral neck. Plain films of the right knee reportedly show stable osteoarthritis changes and a small effusion without clear evidence of acute fracture there. Overall, this could be a pathologic fracture due to long term Prednisone use and underlying severe osteoporosis. Recurrent colon cancer seems less likely, though another underlying undiagnosed malignancy could conceivably be on the differential.      -- Inpatient. NPO. Bedrest. Orthopedics  consulted. Tylenol, Oxycodone, IV Dilaudid as needed for pain. Anti-emetics as needed. Aggressive IS ordered.    -- Follow up CBC and BMP ordered in the ED    Kan-operative Risk Assessment: RCRI 0 with Functional Status greater than 4 METS going for intermediate risk procedure for CV events. EKG shows NSR without ST segment changes or TWI. A TTE in 2020 showed preserved LVEF with mild TR. Recommend to proceed to urgent surgery without further cardiac intervention or testing.    History of PMR: Given above history, she could be at risk for kan-operative adrenal insufficiency. She has some features on exam that could be consistent with Cushingoid habitus.    -- I recommended kan-operative stress dose steroids; would recommend 25 mg IV Hydrocortisone q 8 hours on the day of surgery and POD 1; then to stop that    -- Will also continue Prednisone 8 mg PO daily    -- ISS insulin ordered    Hypertension: Home Lisinopril and metoprolol continued    Chronic back pain: On 650 mg BID aspirin, another NSAID, and prn [place] as an outpatient. Held for now. Repeat BMP in AM. Scheduled bowel regimen continued with prn laxatives.    Chronic anxiety: Elavil, prn Ativan, night time Temazepam continued    History of colon cancer: Stage 3, status post resection with chemotherapy in 2010; the resection was complicated by perforated bowel and in total she had multiple surgeries through 2012. She has chronic adhesions of the bowel wall reportedly. Noted    I have spent 100 minutes on the date of service doing chart review, history, examination, documentation, and further activities per the note.    Chief Complaint   Right hip pain    History is obtained from the patient and the ED physician whom I have spoken with    History of Present Illness   Sixto Jimenez is a markedly pleasant 78 year old woman who presents with progressively worsening pain of the right hip, as well as the right lower back and right knee. It seems she has a  history of chronic pain in those regions. She is followed by [facility] as well as a Rheumatologist as an outpatient. She says that in the past couple of yars she has taken multiple burst courses of Prednisone, and now since 4/2023 she has been taking daily Prednisone, with a prolonged taper, for presumed PMR. She says that on 7/27/23, she stood up and flt sudden worsening pain of the right hip. It has been constant and severe and exacerbated by trying to walk. She saw her team at [facility], and got a steroid injection there, and had x-rays which reportedly showed no change from prior films, and also her Prednisone dose was increased slightly to 10 mg daily from 8. She also underwent a knee injection after that, and last week she had an infusion of medication there; but these interventions have not relieved her ongoing and progressively worsening pain. She has also been working with PT, and today while trying to exercise she developed severe acute worsening of the pain, causing her almost to fall; she was caught before falling. She came in for further evaluation. She denies any other antecedent trauma. She says she normally walks without assistive devices, and walks up a flight of stairs at her domicile without getting chest pain or dyspnea. She otherwise denies any recent weight changes, worsening bruising, or any other acute complaints. Norco was given in the ED with partial relief of her symptoms.    In the ED,   09/20 2030 175/101  97.7  F (36.5  C) 92 16 96 %     EKG showed NSR without ST segment changes or TWI.    Recent Results (from the past 24 hour(s))   XR Pelvis 1/2 Views    Narrative    EXAM: XR PELVIS 1/2 VIEWS  LOCATION: [facility]  DATE: 9/20/2023    INDICATION: fall, pelvic pain  COMPARISON: Pelvis radiographs 08/02/2023      Impression    IMPRESSION: Displaced fracture of the right femoral neck. No dislocation. No additional pelvic fracture. Degenerative changes of the pubic  "symphysis and hips. Pelvic phleboliths.   XR Femur Right 2 Views    Narrative    EXAM: XR FEMUR RIGHT 2 VIEWS  LOCATION: [facility]  DATE: 9/20/2023    INDICATION: fall, right thigh pain  COMPARISON: 08/02/2023      Impression    IMPRESSION: There is an acute, displaced subcapital fracture of the right femoral neck. The femoral head remains seated within the acetabulum. The distal femur is intact.   XR Knee Right 1/2 Views    Narrative    EXAM: XR KNEE RIGHT 1/2 VIEWS  LOCATION: [facility]  DATE: 9/20/2023    INDICATION: fall, right knee pain  COMPARISON: 08/07/2023      Impression    IMPRESSION: Stable tricompartmental osteoarthritis, most severe in the medial and patellofemoral compartments. Tiny amount of suprapatellar knee joint fluid, but no evidence of a displaced fracture. Small superior pole patellar enthesophyte at the distal   quadriceps insertion.       PHYSICAL EXAM  Blood pressure (!) 148/86, pulse 85, temperature 97.7  F (36.5  C), temperature source Temporal, resp. rate 16, height 1.626 m (5' 4\"), weight 65.3 kg (144 lb), SpO2 96 %.  Constitutional: Alert and oriented to person, place and time; no apparent distress  HEENT: Rounded facies; moist mucus membranes  Respiratory: lungs clear to auscultation bilaterally  Cardiovascular: regular S1 S2  GI: abdomen soft non tender non distended bowel sounds positive     DVT Prophylaxis: Pneumatic Compression Devices  Code Status: Full Code, discussed and confirmed with her    Disposition: Expected discharge in 2-4 days after surgery    Abel Guerra MD, MD    Past Medical History    I have reviewed this patient's medical history and updated it with pertinent information if needed.   Past Medical History:   Diagnosis Date    Allergic rhinitis, cause unspecified     Chronic abdominal pain     Chronic bilateral low back pain     DDD    Chronic constipation     Colocutaneous fistula 2012    Fibromyalgia  "    HTN (hypertension)     Malignant neoplasm of transverse colon (H) 2010    Stage 3A 2/22 nodes positive: 10/6/2010 on colonoscopy: at [facility]. Noted mass: Invasive adenocarcinoma: CEA 3.7, size 2 x 12.7 cm.    Mitral valve prolapse     Mixed hyperlipidemia 01/28/2019    Osteoporosis     PVC's (premature ventricular contractions)     Urethral meatal stenosis 2010    Dilated twice by Dr. Eaton of Urology       Past Surgical History   I have reviewed this patient's surgical history and updated it with pertinent information if needed.  Past Surgical History:   Procedure Laterality Date    CHOLECYSTECTOMY, LAPOROSCOPIC      Cholecystectomy, Laparoscopic    COLON SURGERY  10/14/2010    Colectomy    COLONOSCOPY  04/23/2014    Procedure: COLONOSCOPY;  Surgeon: Diana Vázquez MD;  Location:  GI    COLONOSCOPY N/A 04/26/2017    Procedure: COLONOSCOPY;  COLONOSCOPY (MAC) ;  Surgeon: Diana Vázquez MD;  Location: [facility]    EXPLORATORY LAPAROTOMY W/ BOWEL RESECTION  10/16/2010    ex lap, ileocolic resection, ileostomy for postoperative bowel perforation    EXPLORATORY LAPAROTOMY W/ BOWEL RESECTION  06/28/2012    Exploratory laparotomy, extensive lysis of adhesions, resection of ileocolic anastomosis, end ileostomy, removal of mesh    fractured fibula      fractured wrist      GYN SURGERY      tubal ligation    ILEOSTOMY      leaks afterwards    TONSILLECTOMY      ZZC LAP LYSIS OF PERITONEAL ADHESIONS  12/14/2011    colon lysis of adhesions, ileostomy take down       Prior to Admission Medications   Prior to Admission Medications   Prescriptions Last Dose Informant Patient Reported? Taking?   AMITRIPTYLINE HCL PO 9/20/2023 at PM Self Yes Yes   Sig: Take 25 mg by mouth At Bedtime   HYDROcodone-acetaminophen (NORCO) 5-325 MG per tablet Past Week Self Yes Yes   Sig: Take 1-2 tablets by mouth every 4 hours as needed for moderate to severe pain   LORazepam (ATIVAN) 0.5 MG tablet Unknown at PRN Self Yes Yes   Sig: Take  0.25 mg by mouth daily as needed for anxiety   aspirin - buffered (BUFFERIN) 325 MG TABS tablet Past Month Self Yes Yes   Sig: Take 650 mg by mouth 2 times daily as needed for moderate pain   clindamycin (CLEOCIN T) 1 % external lotion Unknown at PRN Self Yes Yes   Sig: Apply topically 2 times daily as needed (acne)   lisinopril (ZESTRIL) 10 MG tablet 9/20/2023 at PM Self Yes Yes   Sig: Take 10 mg by mouth daily   metoprolol succinate ER (TOPROL-XL) 25 MG 24 hr tablet 9/20/2023 at PM Self Yes Yes   Sig: Take 100 mg by mouth daily   naproxen (NAPROSYN) 500 MG tablet 9/20/2023 at AM Self Yes Yes   Sig: Take 500 mg by mouth 2 times daily (with meals)   predniSONE (DELTASONE) 1 MG tablet 9/20/2023 at AM Self Yes Yes   Sig: Take 8 mg by mouth daily   senna-docusate (SENOKOT-S/PERICOLACE) 8.6-50 MG tablet 9/20/2023 at PM Self Yes Yes   Sig: Take 2 tablets by mouth 3 times daily   temazepam (RESTORIL) 15 MG capsule 9/19/2023 at HS Self Yes Yes   Sig: Take 15 mg by mouth At Bedtime      Facility-Administered Medications: None     Allergies   Allergies   Allergen Reactions    Fosamax [Alendronate]      Shut down system    Morphine      Mental status change    Risedronate Sodium [Risedronate]      Shut down system       Social History   I have reviewed this patient's social history and updated it with pertinent information if needed. Sixto Jimenez  reports that she has quit smoking. She has never used smokeless tobacco. She reports that she does not currently use alcohol. She reports that she does not use drugs.    Family History   Family history assessed and, except as above, is non-contributory.    Family History   Problem Relation Age of Onset    Angina Mother     Heart Disease Father     Cerebrovascular Disease Sister        Review of Systems   The 10 point Review of Systems is negative other than noted in the HPI or here.     Primary Care Physician   JOANNE BRAGG    Data   Labs Ordered and Resulted from Time of ED  Arrival to Time of ED Departure - No data to display    Data reviewed today:  I personally reviewed the EKG tracing showing NSR without ST segment changes or TWI. .

## 2023-09-21 NOTE — ED PROVIDER NOTES
History     Chief Complaint:  Hip Pain     HPI   Sixto Jimenez is a 78 year old female presents to the emergency department for worsening right hip pain and inability to ambulate after a near fall today.  Patient states that for the past 4 weeks, she has been having right-sided hip pain that radiates down to her right knee.  Patient was evaluated by [facility] who saw her roughly 3 weeks ago and underwent steroid injections to her right hip and then subsequently she received steroid injections to her right knee for this pain.  Patient states that she traveled down to [place] to manage some personal affairs, but returned back home now staying in a hotel because she is unable to ambulate and is predominantly walker bound now.  She underwent PT treatment today.  Upon return to her hotel room, she had another person help her with her groceries, but the tip of her walker got stuck which led her to lose her balance and almost fall.  Thankfully, the individual that was with her was able to catch her so that she did not fall.  She did not endorse any sudden popping sensation to her right hip and denies falling onto her right hip.  However, patient states that she has had significant pain to her right buttock, and right hip that radiates down to her right thigh, which ultimately led her to the ER for further evaluation.  Patient denies any numbness or weakness in her right lower extremity.  She has a history of colon cancer.  No prior diagnosis of metastatic bone disease.    Independent Historian:   None - Patient Only    Review of External Notes:   Prior office visit notes    Medications:    AMITRIPTYLINE HCL PO  aspirin - buffered (BUFFERIN) 325 MG TABS tablet  clindamycin (CLEOCIN T) 1 % external lotion  HYDROcodone-acetaminophen (NORCO) 5-325 MG per tablet  lisinopril (ZESTRIL) 10 MG tablet  LORazepam (ATIVAN) 0.5 MG tablet  metoprolol succinate ER (TOPROL-XL) 25 MG 24 hr tablet  naproxen (NAPROSYN) 500 MG  tablet  predniSONE (DELTASONE) 1 MG tablet  senna-docusate (SENOKOT-S/PERICOLACE) 8.6-50 MG tablet  temazepam (RESTORIL) 15 MG capsule        Past Medical History:    Past Medical History:   Diagnosis Date    Allergic rhinitis, cause unspecified     Chronic abdominal pain     Chronic bilateral low back pain     Chronic constipation     Colocutaneous fistula 2012    Fibromyalgia     HTN (hypertension)     Malignant neoplasm of transverse colon (H) 2010    Mitral valve prolapse     Mixed hyperlipidemia 01/28/2019    Osteoporosis     PVC's (premature ventricular contractions)     Urethral meatal stenosis 2010       Past Surgical History:    Past Surgical History:   Procedure Laterality Date    CHOLECYSTECTOMY, LAPOROSCOPIC      Cholecystectomy, Laparoscopic    COLON SURGERY  10/14/2010    Colectomy    COLONOSCOPY  04/23/2014    Procedure: COLONOSCOPY;  Surgeon: Diana Vázquez MD;  Location:  GI    COLONOSCOPY N/A 04/26/2017    Procedure: COLONOSCOPY;  COLONOSCOPY (MAC) ;  Surgeon: Diana Vázquez MD;  Location:  GI    EXPLORATORY LAPAROTOMY W/ BOWEL RESECTION  10/16/2010    ex lap, ileocolic resection, ileostomy for postoperative bowel perforation    EXPLORATORY LAPAROTOMY W/ BOWEL RESECTION  06/28/2012    Exploratory laparotomy, extensive lysis of adhesions, resection of ileocolic anastomosis, end ileostomy, removal of mesh    fractured fibula      fractured wrist      GYN SURGERY      tubal ligation    ILEOSTOMY      leaks afterwards    TONSILLECTOMY      ZZC LAP LYSIS OF PERITONEAL ADHESIONS  12/14/2011    colon lysis of adhesions, ileostomy take down        Physical Exam   Patient Vitals for the past 24 hrs:   BP Temp Temp src Pulse Resp SpO2 Height Weight   09/21/23 0000 (!) 163/92 -- -- 82 16 99 % -- --   09/20/23 2330 (!) 157/87 -- -- -- 16 96 % -- --   09/20/23 2130 (!) 148/86 -- -- 85 16 96 % -- --   09/20/23 2100 (!) 156/89 -- -- 82 20 93 % -- --   09/20/23 2035 (!) 170/106 -- -- 90 18 95 %  "1.626 m (5' 4\") 65.3 kg (144 lb)   09/20/23 2030 (!) 175/101 97.7  F (36.5  C) Temporal 92 16 96 % -- --      Constitutional:       Appearance: Normal appearance.      General: Not in acute distress.  HENT:      Head: Normocephalic and atraumatic.   Eyes:      Extraocular Movements: Extraocular movements intact.      Conjunctiva/sclera: Conjunctivae normal.   Cardiovascular:      Rate and Rhythm: Normal rate and regular rhythm.   Pulmonary:      Effort: Pulmonary effort is normal. No respiratory distress.   Abdominal:      General: Abdomen is flat. There is no distension.   Musculoskeletal:         General: No swelling or deformity.      Cervical back: Normal range of motion. No rigidity.      Right lower extremity: Normal range of motion of right knee.  Limited range of motion of right hip due to pain.  Normal range of motion right ankle.  2+ right dorsalis pedis pulse.  Cap refill less than 2 seconds.  Sensation intact in right lower extremity.  Tenderness palpation to the lateral right hip.  Skin:     Coloration: Skin is not jaundiced or pale.   Neurological:      General: No focal deficit present.      Mental Status: Alert and oriented to person, place, and time.   Psychiatric:         Mood and Affect: Mood normal.         Behavior: Behavior normal.    Emergency Department Course     Imaging:  XR Knee Right 1/2 Views   Final Result   IMPRESSION: Stable tricompartmental osteoarthritis, most severe in the medial and patellofemoral compartments. Tiny amount of suprapatellar knee joint fluid, but no evidence of a displaced fracture. Small superior pole patellar enthesophyte at the distal    quadriceps insertion.      XR Femur Right 2 Views   Final Result   IMPRESSION: There is an acute, displaced subcapital fracture of the right femoral neck. The femoral head remains seated within the acetabulum. The distal femur is intact.      XR Pelvis 1/2 Views   Final Result   IMPRESSION: Displaced fracture of the right femoral " neck. No dislocation. No additional pelvic fracture. Degenerative changes of the pubic symphysis and hips. Pelvic phleboliths.         Report per radiology    Laboratory:  Labs Ordered and Resulted from Time of ED Arrival to Time of ED Departure - No data to display       Emergency Department Course & Assessments:       Interventions:  Medications   HYDROcodone-acetaminophen (NORCO) 5-325 MG per tablet 2 tablet (2 tablets Oral $Given 23 0824)        Independent Interpretation (X-rays, CTs, rhythm strip):  See ED course    Assessments/Consultations/Discussion of Management or Tests:  ED Course as of 23 0540   Wed Sep 20, 2023   2325 XR Femur Right 2 Views  On my independent interpretation of right femur x-ray, there is a displaced femoral neck fracture.   2333 Discussed patient with hospitalist Dr. Guerra who accepts patient for inpatient admission. He requests pre-op EKG and he will follow those results along with labs.    Thu Sep 21, 2023   0041 EKG 12-lead, tracing only  Normal sinus rhythm.  Rate of 78.  Normal DE and QRS.  Normal QTc.  No acute ST elevation or depression as compared with 2019 EKG.  Nonspecific ST changes in leads V6 likely secondary to motion artifact.       Social Determinants of Health affecting care:   None    Disposition:  The patient was admitted to the hospital under the care of Dr. Guerra.     Impression & Plan    CMS Diagnoses: None    Medical Decision Makin-year-old female as described above presents to the emergency department with progressive worsening right hip pain for the past 3 weeks to the point that she is now walker dependent.  No true trauma history or fall history.  Does have history of colon cancer s/p resection.  No history of metastatic bone disease.  Nonetheless, pathological fracture remains on differential.  It is also potentially possible that patient fractured her hip today due to her near fall.  Nonetheless, patient require admission and  orthopedic surgery consult for surgical intervention.  Pain well controlled at this time.  Will maintain n.p.o. at midnight.  Preop labs.  Discussed care plan with patient who voiced understanding and agreement with plan.  Answered all questions.  Additional work-up and orders as listed in chart.    Please refer to ED course above for details on the patient's treatment course and any changes or updates in care plan beyond my initial evaluation and MDM.      Diagnosis:    ICD-10-CM    1. Closed displaced fracture of right femoral neck (H)  S72.001A            Discharge Medications:  New Prescriptions    No medications on file      FLORIN VENTURA DO  9/20/2023   Florin Ventura DO Yeh, [place], DO  09/21/23 0540

## 2023-09-21 NOTE — ED TRIAGE NOTES
BIBA from the Hotel. Pt was walking using a walking from her appointment with [facility] when her walker tip got trapped in the elevator. Pt had a near fall but did not fall. Pt c/o rt hip  pain , pain starts from rt  lower back, radiates to rt buttocks, rt thigh and rt knee. Pt denied recent trauma and has and the pain x 3 weeks. Pt is from [place]. /90,

## 2023-09-21 NOTE — BRIEF OP NOTE
S/P JULIANNE for OA  Daniel  EBL see dictated op note  No specimens  No anticipated complications     Previous prelim note in error

## 2023-09-21 NOTE — ANESTHESIA PREPROCEDURE EVALUATION
Anesthesia Pre-Procedure Evaluation    Patient: Sixto Jimenez   MRN: [id number] : 1945        Procedure : Procedure(s):  Right hemiarthroplasty          Past Medical History:   Diagnosis Date    Allergic rhinitis, cause unspecified     Chronic abdominal pain     Chronic bilateral low back pain     DDD    Chronic constipation     Colocutaneous fistula     Fibromyalgia     HTN (hypertension)     Malignant neoplasm of transverse colon (H)     Stage 3A  nodes positive: 10/6/2010 on colonoscopy: at [facility]. Noted mass: Invasive adenocarcinoma: CEA 3.7, size 2 x 12.7 cm.    Mitral valve prolapse     Mixed hyperlipidemia 2019    Osteoporosis     PVC's (premature ventricular contractions)     Urethral meatal stenosis     Dilated twice by Dr. Eaton of Urology      Past Surgical History:   Procedure Laterality Date    CHOLECYSTECTOMY, LAPOROSCOPIC      Cholecystectomy, Laparoscopic    COLON SURGERY  10/14/2010    Colectomy    COLONOSCOPY  2014    Procedure: COLONOSCOPY;  Surgeon: Diana Vázquez MD;  Location:  GI    COLONOSCOPY N/A 2017    Procedure: COLONOSCOPY;  COLONOSCOPY (MAC) ;  Surgeon: Diana Vázquez MD;  Location:  GI    EXPLORATORY LAPAROTOMY W/ BOWEL RESECTION  10/16/2010    ex lap, ileocolic resection, ileostomy for postoperative bowel perforation    EXPLORATORY LAPAROTOMY W/ BOWEL RESECTION  2012    Exploratory laparotomy, extensive lysis of adhesions, resection of ileocolic anastomosis, end ileostomy, removal of mesh    fractured fibula      fractured wrist      GYN SURGERY      tubal ligation    ILEOSTOMY      leaks afterwards    TONSILLECTOMY      ZZC LAP LYSIS OF PERITONEAL ADHESIONS  2011    colon lysis of adhesions, ileostomy take down      Allergies   Allergen Reactions    Fosamax [Alendronate]      Shut down system    Morphine      Mental status change    Risedronate Sodium [Risedronate]      Shut down system      Social History      Tobacco Use    Smoking status: Former    Smokeless tobacco: Never   Substance Use Topics    Alcohol use: Not Currently      Wt Readings from Last 1 Encounters:   09/20/23 65.3 kg (144 lb)        EKG  Sinus rhythm   Possible Left atrial enlargement   Anteroseptal infarct , age undetermined   Abnormal ECG   No significant change from 2019  Anesthesia Evaluation   Pt has had prior anesthetic.     No history of anesthetic complications       ROS/MED HX  ENT/Pulmonary:     (+)           allergic rhinitis,                         (-) tobacco use and sleep apnea   Neurologic:    (-) no seizures and no CVA   Cardiovascular:     (+) Dyslipidemia hypertension- -   -  - -                          Irregular Heartbeat/Palpitations (PVC's), valvular problems/murmurs type: MVP          METS/Exercise Tolerance:     Hematologic:       Musculoskeletal: Comment: Osteoporosis    PMR on chronic prednisone, on prednisone taper, currently 8 mg every day    Back pain  (+)  arthritis,   fracture, Fracture location: LUE,         GI/Hepatic:    (-) GERD and liver disease   Renal/Genitourinary: Comment: Urethral stenosis    (-) renal disease   Endo:     (+)            Chronic steroid usage (PMR) for Other.      (-) Type II DM and thyroid disease   Psychiatric/Substance Use:     (+) psychiatric history anxiety       Infectious Disease:       Malignancy:   (+) Malignancy, History of GI.    Other:            Physical Exam    Airway        Mallampati: II   TM distance: > 3 FB   Neck ROM: full   Mouth opening: > 3 cm    Respiratory Devices and Support         Dental       (+) Minor Abnormalities - some fillings, tiny chips      Cardiovascular   cardiovascular exam normal          Pulmonary   pulmonary exam normal                OUTSIDE LABS:  CBC:   Lab Results   Component Value Date    WBC 9.4 09/21/2023    WBC 11.8 (H) 09/21/2023    HGB 10.3 (L) 09/21/2023    HGB 10.1 (L) 09/21/2023    HCT 32.0 (L) 09/21/2023    HCT 30.6 (L) 09/21/2023    PLT  210 09/21/2023     09/21/2023     BMP:   Lab Results   Component Value Date     (L) 09/21/2023     (L) 09/21/2023    POTASSIUM 4.2 09/21/2023    POTASSIUM 4.3 09/21/2023    CHLORIDE 99 09/21/2023    CHLORIDE 97 (L) 09/21/2023    CO2 24 09/21/2023    CO2 24 09/21/2023    BUN 12.1 09/21/2023    BUN 13.8 09/21/2023    CR 0.73 09/21/2023    CR 0.70 09/21/2023     (H) 09/21/2023     (H) 09/21/2023     COAGS: No results found for: PTT, INR, FIBR  POC: No results found for: BGM, HCG, HCGS  HEPATIC:   Lab Results   Component Value Date    ALBUMIN 3.5 09/21/2023    PROTTOTAL 5.6 (L) 09/21/2023    ALT 22 09/21/2023    AST 24 09/21/2023    ALKPHOS 209 (H) 09/21/2023    BILITOTAL 0.4 09/21/2023     OTHER:   Lab Results   Component Value Date    DU 8.8 09/21/2023       Anesthesia Plan    ASA Status:  3    NPO Status:  NPO Appropriate    Anesthesia Type: General.     - Airway: LMA   Induction: Intravenous.   Maintenance: Balanced.        Consents    Anesthesia Plan(s) and associated risks, benefits, and realistic alternatives discussed. Questions answered and patient/representative(s) expressed understanding.     - Discussed: Risks, Benefits and Alternatives for BOTH SEDATION and the PROCEDURE were discussed     - Discussed with:  Patient            Postoperative Care    Pain management: Multi-modal analgesia, Peripheral nerve block (Single Shot).   PONV prophylaxis: Ondansetron (or other 5HT-3), Dexamethasone or Solumedrol, Background Propofol Infusion     Comments:                Ike Leong MD

## 2023-09-21 NOTE — PROGRESS NOTES
[facility]    Medicine Progress Note - Hospitalist Service        Date of Admission:  9/20/2023  8:26 PM    Assessment & Plan:   Sixto Jimenez is a markedly pleasant 78 year old woman with past medical history that is most notable for chronic Prednisone use for initially suspected PMR as well as osteoporosis and chronic back pain, among others; who presents with acute on chronic pain in the right hip, knee and back and is found to have an acute, displaced subcapital fracture of the right femoral neck.     Acute, displaced subcapital fracture of the right femoral neck  -Of note, Ms. Jimenez takes daily Prednisone for presumed PMR and is currently on a prolonged taper; now at 8 mg daily. The therapy began in 4/2023. She also has osteoporosis: her most recent DEXA scan in 8/2023 was most notable for reportedly -2.3 T score in the left femoral neck. The right femoral neck was -1.8 and the right total hip was -1.2.   -she presents for acute on chronic pain in the right hip, knee and back.   -No fall although she twisted her right lower extremity while walking with the help of a walker just prior to presentation  -X-ray reveals an acute displaced subcapital fracture of the right femoral neck  -Orthopedic surgery consulted, plan for surgical repair today.  -Pain control as needed  -N.p.o.    History of PMR  -Patient initially was suspected of having PMR and treated with corticosteroid although her rheumatologist now does not think that she has PMR and she is on a prolonged prednisone taper.    -Currently on 8 mg daily with plan to decrease by 1 mg every 2 weeks  -We will give her stress dose steroid for surgery, start hydrocortisone 25 mg 3 times daily today, will resume her prior to admission prednisone 10 mg daily starting tomorrow     Hypertension  -Continue prior to admission Lisinopril and metoprolol      Chronic back pain  -Prior to admission on aspirin 650 mg 2 times daily and Norco  -Currently on  "oxycodone and as needed Dilaudid due to #1 above.    Chronic anxiety  -Continue prior to admission Elavil, prn Ativan, night time Temazepam      History of colon cancer  -Noted    Diet: NPO for Medical/Clinical Reasons Except for: Meds, Ice Chips     DVT Prophylaxis: Pneumatic Compression Devices   Marrero Catheter: Not present  Code Status: Full Code     Disposition Plan       Expected Discharge Date: 09/22/2023              Entered: Andrew Fischer MD 09/21/2023, 9:16 AM        Clinically Significant Risk Factors Present on Admission                # Drug Induced Platelet Defect: home medication list includes an antiplatelet medication   # Hypertension: Noted on problem list                   The patient's care was discussed with the Bedside Nurse and Patient.    Medical Decision Making       **CLEAR ALL SELECTIONS**      Labs/Imaging Reviewed:  See Information above and Data section below    Time SPENT BY ME on the date of service doing chart review, history, exam, documentation & further activities per the note:  35 MINUTES    Andrew Fischer MD  Hospitalist Service  [facility]  Text Page 7AM-6PM  Securely message with the Vocera Web Console (learn more here)  Text page via [employer] Paging/Directory    ______________________________________________________________________    Interval History   Patient endorses right hip pain.  No nausea or vomiting.  Denies tingling or numbness of right lower extremity.  No chest pain or dyspnea.    Data reviewed today: I reviewed all medications, new labs and imaging results over the last 24 hours. I personally reviewed no images or EKG's today.    Physical Exam   Vital signs:  Temp: 97.9  F (36.6  C) Temp src: Oral BP: (!) 146/90 Pulse: 76   Resp: 18 SpO2: 98 % O2 Device: None (Room air)   Height: 162.6 cm (5' 4\") Weight: 65.3 kg (144 lb)  Estimated body mass index is 24.72 kg/m  as calculated from the following:    Height as of this encounter: 1.626 m " "(5' 4\").    Weight as of this encounter: 65.3 kg (144 lb).      Wt Readings from Last 2 Encounters:   09/20/23 65.3 kg (144 lb)   01/29/19 67.6 kg (149 lb)       Gen: AAOX3, NAD, comfortable  HEENT:  no pallor  Resp: CTA B/L, normal WOB  CVS: RRR, no murmur  Abd/GI: Soft, non-tender. BS- normoactive.  Skin: Warm, dry no rashes  MSK: Restricted range of motion of the right hip.  Neuro- CN- intact. No focal deficits.   Data   Recent Labs   Lab 09/21/23  0729 09/21/23  0248 09/21/23  0041   WBC 9.4  --  11.8*   HGB 10.3*  --  10.1*   MCV 93  --  91     --  233   *  --  132*   POTASSIUM 4.2  --  4.3   CHLORIDE 99  --  97*   CO2 24  --  24   BUN 12.1  --  13.8   CR 0.73  --  0.70   ANIONGAP 11  --  11   DU 8.8  --  8.8   * 101* 101*   ALBUMIN  --   --  3.5   PROTTOTAL  --   --  5.6*   BILITOTAL  --   --  0.4   ALKPHOS  --   --  209*   ALT  --   --  22   AST  --   --  24       Recent Results (from the past 24 hour(s))   XR Pelvis 1/2 Views    Narrative    EXAM: XR PELVIS 1/2 VIEWS  LOCATION: [facility]  DATE: 9/20/2023    INDICATION: fall, pelvic pain  COMPARISON: Pelvis radiographs 08/02/2023      Impression    IMPRESSION: Displaced fracture of the right femoral neck. No dislocation. No additional pelvic fracture. Degenerative changes of the pubic symphysis and hips. Pelvic phleboliths.   XR Femur Right 2 Views    Narrative    EXAM: XR FEMUR RIGHT 2 VIEWS  LOCATION: [facility]  DATE: 9/20/2023    INDICATION: fall, right thigh pain  COMPARISON: 08/02/2023      Impression    IMPRESSION: There is an acute, displaced subcapital fracture of the right femoral neck. The femoral head remains seated within the acetabulum. The distal femur is intact.   XR Knee Right 1/2 Views    Narrative    EXAM: XR KNEE RIGHT 1/2 VIEWS  LOCATION: [facility]  DATE: 9/20/2023    INDICATION: fall, right knee pain  COMPARISON: 08/07/2023      " Impression    IMPRESSION: Stable tricompartmental osteoarthritis, most severe in the medial and patellofemoral compartments. Tiny amount of suprapatellar knee joint fluid, but no evidence of a displaced fracture. Small superior pole patellar enthesophyte at the distal   quadriceps insertion.     Medications      amitriptyline  25 mg Oral At Bedtime    hydrocortisone sodium succinate PF  25 mg Intravenous Q8H    insulin aspart  1-6 Units Subcutaneous Q4H    lisinopril  10 mg Oral Daily    metoprolol succinate ER  100 mg Oral Daily    predniSONE  8 mg Oral Daily    senna-docusate  2 tablet Oral TID    temazepam  15 mg Oral At Bedtime    [START ON 9/22/2023] cholecalciferol  50 mcg Oral Daily

## 2023-09-21 NOTE — PLAN OF CARE
"Orientation: Aox4    Vitals/Tele: VSS RA, pain \"4-mercedes\"/10 on R hip- radiating to R knee- PRN dilaudid, oxycodone, and tylenol     IV Access/drains: R PIV SL    Diet: NPO ex meds and ice chips    Mobility: strict bedrest    GI/: purewick, no BM this shift. Pt has extensive GI hx and is worried about becoming constipated from all of the medications.     Wound/Skin: Intact, pale    Consults: Ortho surgery to consult    Discharge Plan: TBD    See Flow sheets for assessment     "

## 2023-09-21 NOTE — ANESTHESIA CARE TRANSFER NOTE
Patient: Sixto Jimenez    Procedure: Procedure(s):  Right total hip arthroplasty       Diagnosis: Closed displaced fracture of right femoral neck (H) [S72.001A]  Diagnosis Additional Information: No value filed.    Anesthesia Type:   General     Note:    Oropharynx: oropharynx clear of all foreign objects and spontaneously breathing  Level of Consciousness: awake  Oxygen Supplementation: face mask  Level of Supplemental Oxygen (L/min / FiO2): 6  Independent Airway: airway patency satisfactory and stable  Dentition: dentition unchanged  Vital Signs Stable: post-procedure vital signs reviewed and stable  Report to RN Given: handoff report given  Patient transferred to: PACU    Handoff Report: Identifed the Patient, Identified the Reponsible Provider, Reviewed the pertinent medical history, Discussed the surgical course, Reviewed Intra-OP anesthesia mangement and issues during anesthesia, Set expectations for post-procedure period and Allowed opportunity for questions and acknowledgement of understanding      Vitals:  Vitals Value Taken Time   /64 09/21/23 1818   Temp     Pulse 78 09/21/23 1821   Resp 47 09/21/23 1821   SpO2 100 % 09/21/23 1821   .    Electronically Signed By: EMANUEL Weathers CRNA  September 21, 2023  6:22 PM

## 2023-09-21 NOTE — ANESTHESIA PROCEDURE NOTES
Fascia iliaca Procedure Note    Pre-Procedure   Staff -        Anesthesiologist:  Ike Leong MD       Performed By: anesthesiologist       Location: pre-op       Pre-Anesthestic Checklist: patient identified, IV checked, site marked, risks and benefits discussed, informed consent, monitors and equipment checked, pre-op evaluation, at physician/surgeon's request and post-op pain management  Timeout:       Correct Patient: Yes        Correct Procedure: Yes        Correct Site: Yes        Correct Position: Yes        Correct Laterality: Yes        Site Marked: Yes  Procedure Documentation  Procedure: Fascia iliaca       Laterality: right       Patient Position: supine       Patient Prep/Sterile Barriers: sterile gloves, mask, patient draped       Skin prep: Chloraprep       Local skin infiltrated with mL of 1% lidocaine.        Needle Type: other       Needle Gauge: 22.        Needle Length (millimeters): 100        Ultrasound guided       1. Ultrasound was used to identify targeted nerve, plexus, vascular marker, or fascial plane and place a needle adjacent to it in real-time.       2. Ultrasound was used to visualize the spread of anesthetic in close proximity to the above referenced structure.       3. A permanent image is entered into the patient's record.       4. The visualized anatomic structures appeared normal.       5. There were no apparent abnormal pathologic findings.    Assessment/Narrative         The placement was negative for: blood aspirated, painful injection and site bleeding       Paresthesias: No.       Bolus given via needle..        Secured via.        Insertion/Infusion Method: Single Shot       Complications: none    Medication(s) Administered   Bupivacaine 0.5% w/ 1:400K Epi (Injection) - Injection   20 mL - 9/21/2023 3:15:00 PM   Comments:  Bupivacaine 0.5% with 1:400,000 epi 20ml   Patient sedated but commutative throughout the procedure.   Patient tolerated  "well.    Ultrasound Interpretation, peripheral nerve block    1.  Under ultrasound guidance, the needle was inserted and placed in close proximity to the nerve.  2. Ultrasound was also used to visualize the spread of the anesthetic in close proximity to the nerve being blocked.  3. The nerve(s) appeared anatomically normal.   4. There were no apparent abnormal pathological findings.  5. A permanent ultrasound image was saved n the patient's record.    The surgeon has given a verbal order transferring this pt to me for a performance of regional analgesia block for post op pain control. It is requested of me because I am trained and qualifed to perform this block and the surgeon is nether trained nor qualified to perform this procedure.           FOR [facility] ([facility]) ONLY:   Pain Team Contact information: please page the Pain Team Via [employer]. Search \"Pain\". During daytime hours, please page the attending first. At night please page the resident first.      "

## 2023-09-21 NOTE — ED NOTES
Bed: ED17  Expected date: 9/20/23  Expected time: 8:20 PM  Means of arrival: Ambulance  Comments:  Dee 2 78F hip pain; prob. fall

## 2023-09-21 NOTE — PLAN OF CARE
Orthopedic Plan of Care    Discussed with on-call orthopedic surgeon, Dr. Luis Sanchez. Recommend surgical treatment with a right JULIANNE for a femoral neck fracture for later today pending medical optimization.     -Remain NPO until postop.  -NWB RLE/bedrest until postop.  -Hold PTA ASA.  -Continue pain regimen.   -Type and screen ordered.  -Vitamin D level and supplementation ordered.    Formal consult by Dr. Sanchez to follow.    Tianna Terry PA-C  [facility]

## 2023-09-21 NOTE — ED NOTES
[facility]  ED Nurse Handoff Report    ED Chief complaint: Hip Pain and Back Pain      ED Diagnosis:   Final diagnoses:   Closed displaced fracture of right femoral neck (H)       Code Status: UNK    Allergies:   Allergies   Allergen Reactions    Fosamax [Alendronate]      Shut down system    Morphine      Mental status change    Risedronate Sodium [Risedronate]      Shut down system       Patient Story: Pt BIBA from [facility]. States she was walking from a follow-up at [facility] for R hip pain when the tip of her walker got caught in the elevator.   Focused Assessment:  Pain radiates to R buttocks and R lower back.     Treatments and/or interventions provided: PIV; Norco; labs; XR    Patient's response to treatments and/or interventions: Tolerated    To be done/followed up on inpatient unit:  Monitor    Does this patient have any cognitive concerns?:  N/A    Activity level - Baseline/Home:  Walker  Activity Level - Current:   Total Care    Patient's Preferred language: English   Needed?: No    Isolation: None  Infection: Not Applicable  Patient tested for COVID 19 prior to admission: NO  Bariatric?: No    Vital Signs:   Vitals:    09/20/23 2100 09/20/23 2130 09/20/23 2330 09/21/23 0000   BP: (!) 156/89 (!) 148/86 (!) 157/87 (!) 163/92   Pulse: 82 85  82   Resp: 20 16 16 16   Temp:       TempSrc:       SpO2: 93% 96% 96% 99%   Weight:       Height:           Cardiac Rhythm:     Was the PSS-3 completed:   Yes  What interventions are required if any?               Family Comments: N/A  OBS brochure/video discussed/provided to patient/family: N/A              Name of person given brochure if not patient:               Relationship to patient:     For the majority of the shift this patient's behavior was Green.   Behavioral interventions performed were .    ED NURSE PHONE NUMBER: [phone]

## 2023-09-21 NOTE — PHARMACY-ADMISSION MEDICATION HISTORY
Pharmacist Admission Medication History    Admission medication history is complete. The information provided in this note is only as accurate as the sources available at the time of the update.    Medication reconciliation/reorder completed by provider prior to medication history? No    Information Source(s): Patient and CareEverywhere/SureScripts via in-person    Pertinent Information:   Patient reports she is tapering as instructed by rhematologist. Started 8 mg on 9/19. Plan was to decrease to 7 mg in 2 weeks and continue decreasing every 2-4 weeks as tolerated.    Changes made to PTA medication list:  Added: lisinopril, prednisone, naproxen, clindamycin, senna-docusate  Deleted: fish oil, vitamin d, mvi, calcium, sennosides  Changed: norco, ativan, amitriptyline, temazepam    Medication Affordability:  Not including over the counter (OTC) medications, was there a time in the past 3 months when you did not take your medications as prescribed because of cost?: No    Allergies reviewed with patient and updates made in EHR: yes    Medication History Completed By: Arline Roberson RPH 9/20/2023 10:58 PM    Prior to Admission medications    Medication Sig Last Dose Taking? Auth Provider Long Term End Date   AMITRIPTYLINE HCL PO Take 25 mg by mouth At Bedtime 9/20/2023 at PM Yes Reported, Patient Yes    aspirin - buffered (BUFFERIN) 325 MG TABS tablet Take 650 mg by mouth 2 times daily as needed for moderate pain Past Month Yes Reported, Patient     clindamycin (CLEOCIN T) 1 % external lotion Apply topically 2 times daily as needed (acne) Unknown at PRN Yes Unknown, Entered By History     HYDROcodone-acetaminophen (NORCO) 5-325 MG per tablet Take 1-2 tablets by mouth every 4 hours as needed for moderate to severe pain Past Week Yes Reported, Patient     lisinopril (ZESTRIL) 10 MG tablet Take 10 mg by mouth daily 9/20/2023 at PM Yes Unknown, Entered By History Yes    LORazepam (ATIVAN) 0.5 MG tablet Take 0.25 mg by mouth daily  as needed for anxiety Unknown at PRN Yes Reported, Patient     metoprolol succinate ER (TOPROL-XL) 25 MG 24 hr tablet Take 100 mg by mouth daily 9/20/2023 at PM Yes Reported, Patient Yes    naproxen (NAPROSYN) 500 MG tablet Take 500 mg by mouth 2 times daily (with meals) 9/20/2023 at AM Yes Unknown, Entered By History     predniSONE (DELTASONE) 1 MG tablet Take 8 mg by mouth daily 9/20/2023 at AM Yes Unknown, Entered By History     senna-docusate (SENOKOT-S/PERICOLACE) 8.6-50 MG tablet Take 2 tablets by mouth 3 times daily 9/20/2023 at PM Yes Unknown, Entered By History     temazepam (RESTORIL) 15 MG capsule Take 15 mg by mouth At Bedtime 9/19/2023 at HS Yes Reported, Patient

## 2023-09-21 NOTE — ANESTHESIA PROCEDURE NOTES
Airway       Patient location during procedure: OR  Staff -        Anesthesiologist:  Ike Leong MD       CRNA: Angeline Garcia APRN CRNA       Performed By: CRNA  Consent for Airway        Urgency: elective  Indications and Patient Condition       Indications for airway management: kan-procedural       Induction type:intravenous       Mask difficulty assessment: 0 - not attempted    Final Airway Details       Final airway type: supraglottic airway    Supraglottic Airway Details        Type: LMA       Brand: I-Gel       LMA size: 4    Post intubation assessment        Placement verified by: capnometry and chest rise        Number of attempts at approach: 1       Number of other approaches attempted: 0       Secured with: silk tape       Ease of procedure: easy       Dentition: Unchanged

## 2023-09-21 NOTE — PLAN OF CARE
Goal Outcome Evaluation:      Plan of Care Reviewed With: patient    Overall Patient Progress: no changeOverall Patient Progress: no change     A&O x 4. VSS, RA. CMS intact. BS active. Bedrest. Pain managed with oxy.  Voiding adequate amount in purewick. NPO since midnight. Report given to pre-op RN, pt going down for OR.

## 2023-09-21 NOTE — CONSULTS
Chief complaint: Right hip pain    History: The patient is a pleasant 78-year-old female who is a community ambulator with no assistive device who had previous hip pain treated with physical therapy.  There was a concern that this could be related to her back.  She then noted an abrupt increase yesterday.  X-ray showed a femoral neck fracture.  She describes no other pain or complaints or problems.    She does describe a history of colon cancer with chemotherapy many years ago (2010).  She has been on prednisone in the past for polymyalgia rheumatica although this diagnosis is now been refuted.  She is on 8 mg/day currently.  She does have anxiety.  She works as a psychologist.    For remaining details of her medical history medications allergies please see the written medical record.    Physical examination: The patient's leg is shortened and externally rotated.  She has intact skin.  She is able to flex and extend her ankle.  She has no tenderness elsewhere.    Radiographs show a displaced femoral neck fracture.  There is no evidence of a destructive lesion    Impression:   1-Displaced right femoral neck fracture  2-chronic prednisone use  3-osteopenia    Recommendation: I did detailed discussion with the patient and her daughter regarding her diagnosis.  I recommend surgical treatment with total hip arthroplasty.  I did review that she is at increased risk for postoperative complications due to the above factors.  Specifically she is at increased risk for infection and fracture.  I reviewed risk such as dislocation leg length inequality and delayed recovery.  Antibiotic impregnated cement will be used due to her chronic prednisone use.  I will use a cemented femur because of her osteopenia.  Questions were answered.  I reviewed postoperative hip precautions in the preop area with her and her daughter.

## 2023-09-21 NOTE — OP NOTE
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  A decubitus position was used followed by a mini posterior approach, splitting the ITB and reflecting the short ER with the capsule as a sleeve, leaving the piriformis attached. The hip was dislocated followed by a neck cut based on preop planning and templating. Poor quality bone was noted.  There was substantial crushing of the neck bone.  There was sclerosis and flattening at the head consistent with a possible chronic fracture or avascular necrosis.  There was minimal blood.  The head itself was flattened and very small consistent with avascular necrosis.  The head was sent for pathology.  Retractors were carefully placed and the sciatic nerve was palpated and protected followed by sequential reaming to one mm under the final cup size. Socket fixation was solid and the poly liner was placed.   Attention was directed to the femur; after reaming and broaching, a Venango restrictor was placed with lavage, drying, and retrograde filling with pressurization of tobra Simplex cement. The polished Donis Heritage stem was placed matching anteversion. Cement dried with axial pressure. After trialing, the appropriate 36 mm head and neck was selected and placed based on the requirement of acceptable stability. Leg lengths were carefully assessed and restored based on templating, multiple intra-op measures and the need for adequate stability. Extensive Betadine soak was done. A capsular closure was accomplished. After extensive lavage, the remaining layered anatomic closure was  accomplished.    Implants:   Femur Donis Heritage:14 std offset  Acetabulum G7: 54  Screws:2  Insert:high wall  Head: Ceramic 36 mm Head with -3.5 mm length    Notable Findings and Technical Aspects of procedure:  [id number]  Vanco powder-yes  Stability 90/30/80  Leg length equal with slight possible lengthening for stability which was needed  AVN and fracture noted

## 2023-09-22 ENCOUNTER — APPOINTMENT (OUTPATIENT)
Dept: OCCUPATIONAL THERAPY | Facility: CLINIC | Age: 78
DRG: 522 | End: 2023-09-22
Attending: ORTHOPAEDIC SURGERY
Payer: MEDICARE

## 2023-09-22 ENCOUNTER — APPOINTMENT (OUTPATIENT)
Dept: GENERAL RADIOLOGY | Facility: CLINIC | Age: 78
DRG: 522 | End: 2023-09-22
Attending: PHYSICIAN ASSISTANT
Payer: MEDICARE

## 2023-09-22 ENCOUNTER — APPOINTMENT (OUTPATIENT)
Dept: PHYSICAL THERAPY | Facility: CLINIC | Age: 78
DRG: 522 | End: 2023-09-22
Attending: ORTHOPAEDIC SURGERY
Payer: MEDICARE

## 2023-09-22 LAB
ANION GAP SERPL CALCULATED.3IONS-SCNC: 11 MMOL/L (ref 7–15)
BUN SERPL-MCNC: 21.3 MG/DL (ref 8–23)
CALCIUM SERPL-MCNC: 8.7 MG/DL (ref 8.8–10.2)
CHLORIDE SERPL-SCNC: 98 MMOL/L (ref 98–107)
CREAT SERPL-MCNC: 0.87 MG/DL (ref 0.51–0.95)
DEPRECATED CALCIDIOL+CALCIFEROL SERPL-MC: 25 UG/L (ref 20–75)
DEPRECATED HCO3 PLAS-SCNC: 24 MMOL/L (ref 22–29)
EGFRCR SERPLBLD CKD-EPI 2021: 69 ML/MIN/1.73M2
ERYTHROCYTE [DISTWIDTH] IN BLOOD BY AUTOMATED COUNT: 15.6 % (ref 10–15)
GLUCOSE BLDC GLUCOMTR-MCNC: 103 MG/DL (ref 70–99)
GLUCOSE BLDC GLUCOMTR-MCNC: 123 MG/DL (ref 70–99)
GLUCOSE BLDC GLUCOMTR-MCNC: 136 MG/DL (ref 70–99)
GLUCOSE BLDC GLUCOMTR-MCNC: 144 MG/DL (ref 70–99)
GLUCOSE BLDC GLUCOMTR-MCNC: 169 MG/DL (ref 70–99)
GLUCOSE BLDC GLUCOMTR-MCNC: 92 MG/DL (ref 70–99)
GLUCOSE SERPL-MCNC: 126 MG/DL (ref 70–99)
HCT VFR BLD AUTO: 31.8 % (ref 35–47)
HGB BLD-MCNC: 10.1 G/DL (ref 11.7–15.7)
MCH RBC QN AUTO: 29.8 PG (ref 26.5–33)
MCHC RBC AUTO-ENTMCNC: 31.8 G/DL (ref 31.5–36.5)
MCV RBC AUTO: 94 FL (ref 78–100)
PLATELET # BLD AUTO: 250 10E3/UL (ref 150–450)
POTASSIUM SERPL-SCNC: 4.3 MMOL/L (ref 3.4–5.3)
RBC # BLD AUTO: 3.39 10E6/UL (ref 3.8–5.2)
SODIUM SERPL-SCNC: 133 MMOL/L (ref 136–145)
WBC # BLD AUTO: 16.9 10E3/UL (ref 4–11)

## 2023-09-22 PROCEDURE — 250N000012 HC RX MED GY IP 250 OP 636 PS 637: Performed by: HOSPITALIST

## 2023-09-22 PROCEDURE — 99232 SBSQ HOSP IP/OBS MODERATE 35: CPT | Performed by: INTERNAL MEDICINE

## 2023-09-22 PROCEDURE — 120N000001 HC R&B MED SURG/OB

## 2023-09-22 PROCEDURE — 250N000013 HC RX MED GY IP 250 OP 250 PS 637: Performed by: ORTHOPAEDIC SURGERY

## 2023-09-22 PROCEDURE — 250N000013 HC RX MED GY IP 250 OP 250 PS 637: Performed by: HOSPITALIST

## 2023-09-22 PROCEDURE — 85027 COMPLETE CBC AUTOMATED: CPT | Performed by: INTERNAL MEDICINE

## 2023-09-22 PROCEDURE — 97161 PT EVAL LOW COMPLEX 20 MIN: CPT | Mod: GP

## 2023-09-22 PROCEDURE — 97165 OT EVAL LOW COMPLEX 30 MIN: CPT | Mod: GO

## 2023-09-22 PROCEDURE — 80048 BASIC METABOLIC PNL TOTAL CA: CPT | Performed by: INTERNAL MEDICINE

## 2023-09-22 PROCEDURE — 250N000013 HC RX MED GY IP 250 OP 250 PS 637: Performed by: PHYSICIAN ASSISTANT

## 2023-09-22 PROCEDURE — 97535 SELF CARE MNGMENT TRAINING: CPT | Mod: GO

## 2023-09-22 PROCEDURE — 36415 COLL VENOUS BLD VENIPUNCTURE: CPT | Performed by: INTERNAL MEDICINE

## 2023-09-22 PROCEDURE — 250N000011 HC RX IP 250 OP 636: Mod: JZ | Performed by: INTERNAL MEDICINE

## 2023-09-22 PROCEDURE — 250N000011 HC RX IP 250 OP 636: Performed by: ORTHOPAEDIC SURGERY

## 2023-09-22 PROCEDURE — 73562 X-RAY EXAM OF KNEE 3: CPT | Mod: RT

## 2023-09-22 PROCEDURE — 97530 THERAPEUTIC ACTIVITIES: CPT | Mod: GP

## 2023-09-22 PROCEDURE — 82962 GLUCOSE BLOOD TEST: CPT

## 2023-09-22 RX ORDER — ACETAMINOPHEN 325 MG/1
975 TABLET ORAL EVERY 8 HOURS
Qty: 60 TABLET | Refills: 0 | DISCHARGE
Start: 2023-09-22

## 2023-09-22 RX ORDER — NICOTINE POLACRILEX 4 MG
15-30 LOZENGE BUCCAL
Status: DISCONTINUED | OUTPATIENT
Start: 2023-09-22 | End: 2023-09-24 | Stop reason: HOSPADM

## 2023-09-22 RX ORDER — DEXTROSE MONOHYDRATE 25 G/50ML
25-50 INJECTION, SOLUTION INTRAVENOUS
Status: DISCONTINUED | OUTPATIENT
Start: 2023-09-22 | End: 2023-09-24 | Stop reason: HOSPADM

## 2023-09-22 RX ORDER — VITAMIN B COMPLEX
50 TABLET ORAL DAILY
Qty: 60 TABLET | Refills: 0 | DISCHARGE
Start: 2023-09-23 | End: 2023-10-23

## 2023-09-22 RX ORDER — ASPIRIN 325 MG
325 TABLET, DELAYED RELEASE (ENTERIC COATED) ORAL DAILY
Qty: 42 TABLET | Refills: 0 | DISCHARGE
Start: 2023-09-23 | End: 2023-11-04

## 2023-09-22 RX ORDER — POLYETHYLENE GLYCOL 3350 17 G/17G
17 POWDER, FOR SOLUTION ORAL DAILY
Qty: 510 G | Refills: 0 | DISCHARGE
Start: 2023-09-22

## 2023-09-22 RX ORDER — OXYCODONE HYDROCHLORIDE 5 MG/1
5 TABLET ORAL EVERY 4 HOURS PRN
Qty: 25 TABLET | Refills: 0 | Status: SHIPPED | OUTPATIENT
Start: 2023-09-22 | End: 2023-09-28

## 2023-09-22 RX ORDER — AMOXICILLIN 250 MG
1 CAPSULE ORAL 2 TIMES DAILY PRN
Qty: 21 TABLET | Refills: 0 | DISCHARGE
Start: 2023-09-22 | End: 2023-09-23

## 2023-09-22 RX ADMIN — CEFAZOLIN 1 G: 1 INJECTION, POWDER, FOR SOLUTION INTRAMUSCULAR; INTRAVENOUS at 01:06

## 2023-09-22 RX ADMIN — ACETAMINOPHEN 650 MG: 325 TABLET, FILM COATED ORAL at 09:23

## 2023-09-22 RX ADMIN — HYDROCORTISONE SODIUM SUCCINATE 25 MG: 100 INJECTION, POWDER, FOR SOLUTION INTRAMUSCULAR; INTRAVENOUS at 00:56

## 2023-09-22 RX ADMIN — AMITRIPTYLINE HYDROCHLORIDE 25 MG: 25 TABLET, FILM COATED ORAL at 21:20

## 2023-09-22 RX ADMIN — OXYCODONE HYDROCHLORIDE 5 MG: 5 TABLET ORAL at 11:12

## 2023-09-22 RX ADMIN — POLYETHYLENE GLYCOL 3350 17 G: 17 POWDER, FOR SOLUTION ORAL at 09:12

## 2023-09-22 RX ADMIN — ACETAMINOPHEN 975 MG: 325 TABLET, FILM COATED ORAL at 13:46

## 2023-09-22 RX ADMIN — INSULIN ASPART 1 UNITS: 100 INJECTION, SOLUTION INTRAVENOUS; SUBCUTANEOUS at 00:52

## 2023-09-22 RX ADMIN — CEFAZOLIN 1 G: 1 INJECTION, POWDER, FOR SOLUTION INTRAMUSCULAR; INTRAVENOUS at 09:11

## 2023-09-22 RX ADMIN — SENNOSIDES AND DOCUSATE SODIUM 2 TABLET: 50; 8.6 TABLET ORAL at 16:19

## 2023-09-22 RX ADMIN — OXYCODONE HYDROCHLORIDE 5 MG: 5 TABLET ORAL at 20:38

## 2023-09-22 RX ADMIN — LORAZEPAM 0.25 MG: 0.5 TABLET ORAL at 16:00

## 2023-09-22 RX ADMIN — SENNOSIDES AND DOCUSATE SODIUM 2 TABLET: 50; 8.6 TABLET ORAL at 09:12

## 2023-09-22 RX ADMIN — ACETAMINOPHEN 975 MG: 325 TABLET, FILM COATED ORAL at 21:20

## 2023-09-22 RX ADMIN — SENNOSIDES AND DOCUSATE SODIUM 2 TABLET: 50; 8.6 TABLET ORAL at 13:44

## 2023-09-22 RX ADMIN — LISINOPRIL 10 MG: 10 TABLET ORAL at 09:12

## 2023-09-22 RX ADMIN — TEMAZEPAM 15 MG: 15 CAPSULE ORAL at 21:20

## 2023-09-22 RX ADMIN — ACETAMINOPHEN 975 MG: 325 TABLET, FILM COATED ORAL at 05:11

## 2023-09-22 RX ADMIN — SENNOSIDES AND DOCUSATE SODIUM 2 TABLET: 50; 8.6 TABLET ORAL at 19:54

## 2023-09-22 RX ADMIN — ASPIRIN 325 MG: 325 TABLET, COATED ORAL at 09:12

## 2023-09-22 RX ADMIN — METOPROLOL SUCCINATE 100 MG: 100 TABLET, EXTENDED RELEASE ORAL at 19:54

## 2023-09-22 RX ADMIN — Medication 50 MCG: at 09:12

## 2023-09-22 ASSESSMENT — ACTIVITIES OF DAILY LIVING (ADL)
ADLS_ACUITY_SCORE: 44

## 2023-09-22 NOTE — PROGRESS NOTES
"   09/22/23 1047   Appointment Info   Signing Clinician's Name / Credentials (OT) Razia Keyes OTR/L   Quick Adds   Quick Adds Certification   Living Environment   People in Home alone   Current Living Arrangements hotel/motel   Home Accessibility no concerns   Transportation Anticipated family or friend will provide   Living Environment Comments Lives in [place], has been staying at a hotel for past several weeks. Is wanting to discharge back to hotel with friend.   Self-Care   Usual Activity Tolerance good   Current Activity Tolerance fair   Equipment Currently Used at Home walker, rolling;wheelchair, manual   Fall history within last six months no   Activity/Exercise/Self-Care Comment Has access to FWW and w/c.   General Information   Onset of Illness/Injury or Date of Surgery 09/20/23   Referring Physician Daniel   Patient/Family Therapy Goal Statement (OT) None stated   Additional Occupational Profile Info/Pertinent History of Current Problem Admitted with femoral neck fx, s/p R JULIANNE.   Existing Precautions/Restrictions no hip IR;no hip ADD past midline;90 degree hip flexion;fall   Cognitive Status Examination   Cognitive Status Comments No obvious cognitive deficits noted. A bit anxious throughout. Recalls all hip precautions.   Pain Assessment   Patient Currently in Pain Yes, see Vital Sign flowsheet   Strength Comprehensive (MMT)   Comment, General Manual Muscle Testing (MMT) Assessment WFL   Balance   Balance Comments Not tested due to \"wooziness\" sitting upright in chair, drop in BP.   Activities of Daily Living   BADL Assessment/Intervention   (DEP with LE ADL)   Clinical Impression   Criteria for Skilled Therapeutic Interventions Met (OT) Yes, treatment indicated   OT Diagnosis Decreased ADL   Influenced by the following impairments hip precautions, decreased activity tolerance   OT Problem List-Impairments impacting ADL activity tolerance impaired;post-surgical precautions   Assessment of Occupational " "Performance 1-3 Performance Deficits   Identified Performance Deficits dressing, transfers, bathing   Planned Therapy Interventions (OT) ADL retraining   Clinical Decision Making Complexity (OT) low complexity   Risk & Benefits of therapy have been explained evaluation/treatment results reviewed;care plan/treatment goals reviewed;patient   OT Total Evaluation Time   OT Eval, Low Complexity Minutes (84324) 10   OT Goals   Therapy Frequency (OT) Daily   OT Predicted Duration/Target Date for Goal Attainment 09/24/23   OT Goals Lower Body Dressing;Toilet Transfer/Toileting   OT: Lower Body Dressing Modified independent;using adaptive equipment;within precautions   OT: Toilet Transfer/Toileting toilet transfer;Modified independent;cleaning and garment management   Interventions   Interventions Quick Adds Self-Care/Home Management   Self-Care/Home Management   Self-Care/Home Mgmt/ADL, Compensatory, Meal Prep Minutes (93693) 45   Symptoms Noted During/After Treatment (Meal Preparation/Planning Training) fatigue   Treatment Detail/Skilled Intervention Educated patient on precautions related to ADL and AE to dress self. Issued handouts for reinforcement as well as patient putting information provided into her phone. Patient donned/doffed underwear and socks after education, using reacher and sock aide with MIN A. Increased time for rest in between each task due to \"wooziness\".   OT Discharge Planning   OT Plan Dressing with AE, toilet tx .   OT Rationale for DC Rec Patient below baseline with ADLs, limited by pain and dizziness with actiivty, hip precautions.   OT Brief overview of current status MIN A for dressing.   Total Session Time   Timed Code Treatment Minutes 45   Total Session Time (sum of timed and untimed services) 55                                                                                   [facility]      OUTPATIENT OCCUPATIONAL THERAPY  EVALUATION  PLAN OF TREATMENT FOR " OUTPATIENT REHABILITATION  (COMPLETE FOR INITIAL CLAIMS ONLY)  Patient's Last Name, First Name, M.I.  YOB: 1945  Sixto Jimenez                          Provider's Name  [facility] Medical Record No.  [id number]                               Onset Date:  09/20/23   Start of Care Date:        Type:     ___PT   _X_OT   ___SLP Medical Diagnosis:                           OT Diagnosis:  Decreased ADL   Visits from SOC:  1   _________________________________________________________________________________  Plan of Treatment/Functional Goals    Planned Interventions: ADL retraining   Goals: See Occupational Therapy Goals on Care Plan in [facility] electronic health record.    Therapy Frequency: Daily  Predicted Duration of Therapy Intervention: 09/24/23  _________________________________________________________________________________    I CERTIFY THE NEED FOR THESE SERVICES FURNISHED UNDER        THIS PLAN OF TREATMENT AND WHILE UNDER MY CARE .             Physician Signature               Date    X_____________________________________________________                   ,      Referring Physician: Daniel            Initial Assessment        See Occupational Therapy evaluation dated   in Epic electronic health record.

## 2023-09-22 NOTE — PLAN OF CARE
Pt A/Ox4, VSS on 1Lo2, denies pain, baseline numbness BLE, purewick in use, will continue to monitor

## 2023-09-22 NOTE — PLAN OF CARE
Goal Outcome Evaluation:    Diagnosis: Right Total Hip Arthoplasty  POD#:1  Mental Status:Alert & oriented  Activity/dangle assist x2 with lift  Diet:Regular  Pain:Schedule medication  Marrero/Voiding:exterder cath  Tele/Restraints/Iso:NA  02/LDA:   D/C Date:Pending  Other Info:IV Sl, dressing DCI.  Management continue

## 2023-09-22 NOTE — PROGRESS NOTES
Care Management Follow Up    Length of Stay (days): 1    Expected Discharge Date: 09/22/2023     Concerns to be Addressed:       Patient plan of care discussed at interdisciplinary rounds: Yes    Anticipated Discharge Disposition:       Anticipated Discharge Services:    Anticipated Discharge DME:      Patient/family educated on Medicare website which has current facility and service quality ratings:    Education Provided on the Discharge Plan:    Patient/Family in Agreement with the Plan:      Referrals Placed by CM/SW:    Private pay costs discussed: private room/amenity fees    Additional Information:  Writer spoke with PT who is recommending TCU for pt.    Writer met with pt at bedside. OT is currently in the room working with pt. Discussed with pt that Pt is recommending TCU. OT agrees and is recommending as well. Pt states she also feels going to TCU would be beneficial for her and safer than returning home. Pt states that she would like to attend TCU. Discussed with pt that since she is medicare and out pt status that she would have to private pay at a facility if she were to attend TCU rehab. Pt states understanding. Discussed that it would be likely anywhere from $400 dollars or more a day. Pt states understanding. OT informed pt that they predict that she could be at TCU anywhere from 3-5 days or a little more. Pt states understanding that this is a prediction and that it could be a longer time. Pt would like referral sent to [facility] and would like a cost estimate to private pay per day from [facility]. Writer informed her that writer can get a range but typically TCU will do an assessment upon admission that will help them figure out how much she would actually end up paying per day. Pt states understanding. Pt states that she would also like a list of tcu's in the [facility] area. Writer provided pt with list of TCU's. Pt is agreeable to referral to [facility] and to obtain a range of private pay cost. Pt states no  further questions or concerns at this time.     Addendum: Referral sent to [facility]. Spoke with Magdalene who states will send information to billing office but is aware pt will private paying for TCU unless somehow billing office finds a way for pt's secondary to cover anything. Magdalene stats approximate cost of $500 a day. Magdalene will have representative of [facility] come and see pt in hospital to discuss further.     Writer sent message to Dr. Fischer in [facility] to see if he feels that pt is stable for discharge today.  Message sent to bedside RN in [facility] about inquiring with doctor to see if pt stable for discharge today.     Writer met with pt who is agreeable to meet with Matteo to discuss more of the financial obligations of attending TCU as well as have any questions she may have answered. Magdalene states if pt not stable for discharge today they could take her tomorrow.   Magdalene states Matteo will be over to talk to pt shortly.    Addendum: Writer spoke with Magdalene. Magdalene questioning in pt status.  Writer spoke with supervisor Akilah and order for in pt not placed until today 9/22 therefore if pt discharges tomorrow or over weekend will be private pay as will not have three night stay. Writer discussed with Magdalene who states understanding. Magdalene would like to plan for tentative discharge tomorrow at noon.   Writer updated Dr. Fischer about plan for tentative discharge tomorrow at noon.   Writer updated charge about possible discharge tomorrow at noon if cleared by  and ortho.   Writer updated bedside RN of the same in [facility] message.      Writer met with pt of tentative discharge for tomorrow at noon if cleared by doctors. Pt understanding.  Pt states that Matteo from [facility] came and visited with her. Writer asked pt if she had all of her questions regarding private pay at TCU answered by Matteo. Pt states that she did. Pt would like her friend Julio to transport her via wheelchair  through [facility] to [place] at time of discharge. Discussed cost of wheelchair vehicle transport in event she would want that. Pt understanding.       Kami Umana, BSW  Social Work  [facility]

## 2023-09-22 NOTE — PROGRESS NOTES
[facility]    Medicine Progress Note - Hospitalist Service        Date of Admission:  9/20/2023  8:26 PM    Assessment & Plan:   Sixto Jimenez is a markedly pleasant 78 year old woman with past medical history that is most notable for chronic Prednisone use for initially suspected PMR as well as osteoporosis and chronic back pain, among others; who presents with acute on chronic pain in the right hip, knee and back and is found to have an acute, displaced subcapital fracture of the right femoral neck.     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  -Of note, Ms. Jimenez takes daily Prednisone for presumed PMR and is currently on a prolonged taper; now at 8 mg daily. The therapy began in 4/2023. She also has osteoporosis: her most recent DEXA scan in 8/2023 was most notable for reportedly -2.3 T score in the left femoral neck. The right femoral neck was -1.8 and the right total hip was -1.2.   -she presents for acute on chronic pain in the right hip, knee and back.   -No fall although she twisted her right lower extremity while walking with the help of a walker just prior to presentation  -X-ray reveals an acute displaced subcapital fracture of the right femoral neck  -Orthopedic surgery consulted, patient underwent right total hip arthroplasty yesterday.  Intraoperatively was found to have AVN.  Please see discussion regarding corticosteroid below.  -Defer routine postop care to orthopedic surgery.  Pain control and DVT prophylaxis  -PT and OT as able    History of PMR  -Follows up with Dr. Minerva Vilchis, rheumatology as outpatient  -Patient initially was suspected of having PMR and treated with corticosteroid although her rheumatologist now does not think that she has PMR and she is on a prolonged prednisone taper.    -Currently on 8 mg daily with plan to decrease by 1 mg every 2 weeks  -She received stress dose hydrocortisone  25 mg 3 times daily yesterday  -Resume prior to admission prednisone 8 mg daily starting tomorrow  -I called 's office to discuss about potentially tapering her corticosteroid quickly given finding of AVN and patient's concern.  She was off today, discussed with , who recommended discussing with Dr. Vilchis on Monday regarding this plan as she was not entirely sure her indication for corticosteroid based on his chart review.  -If patient is still in the hospital until Monday then this can be followed up by the team on Monday otherwise if she is discharged before that then the patient will follow-up herself and discussed with Dr. Vilchis.  This was discussed with Sixto today and she is in agreement with the plan.    Leukocytosis  -Patient had a bump in her WBC count this morning, suspect this is due to stress dose of Solu-Cortef she received yesterday  -Recheck in the morning.     Hypertension  -Continue prior to admission Lisinopril and metoprolol      Chronic back pain  -Prior to admission on aspirin 650 mg 2 times daily and Norco  -Currently on oxycodone and Dilaudid due to above fracture.    Chronic anxiety  -Continue prior to admission Elavil, prn Ativan, night time Temazepam      History of colon cancer  -Noted    Diet: Advance Diet as Tolerated: Regular Diet Adult     DVT Prophylaxis: Pneumatic Compression Devices   Marrero Catheter: Not present  Code Status: Full Code     Disposition Plan       Expected Discharge Date: 09/22/2023              Entered: Andrew Fischer MD 09/22/2023, 10:21 AM        Clinically Significant Risk Factors Present on Admission                  # Drug Induced Platelet Defect: home medication list includes an antiplatelet medication     # Hypertension: Noted on problem list                   The patient's care was discussed with the Bedside Nurse and Patient.    Medical Decision Making       **CLEAR ALL SELECTIONS**      Labs/Imaging Reviewed:  See Information above and  "Data section below    Time SPENT BY ME on the date of service doing chart review, history, exam, documentation & further activities per the note:  35 MINUTES    Andrew Fischer MD  Hospitalist Service  [facility]  Text Page 7AM-6PM  Securely message with the Vocera Web Console (learn more here)  Text page via [employer] Paging/Directory    ______________________________________________________________________    Interval History   Patient underwent right hip total arthroplasty yesterday and was actually found to have concern for avascular necrosis.  We had a long discussion about corticosteroid and the role potentially on avascular necrosis.  She questioned if we could taper her prednisone quickly, I attempted to discuss with her primary rheumatologist but she was not available on the phone.  This will have to be followed up next week.  Pain is adequately controlled.  Dynamically stable.    Data reviewed today: I reviewed all medications, new labs and imaging results over the last 24 hours. I personally reviewed no images or EKG's today.    Physical Exam   Vital signs:  Temp: 98  F (36.7  C) Temp src: Oral BP: 120/71 Pulse: 84   Resp: 18 SpO2: 94 % O2 Device: None (Room air) Oxygen Delivery: 2 LPM Height: 162.6 cm (5' 4\") Weight: 65.3 kg (144 lb)  Estimated body mass index is 24.72 kg/m  as calculated from the following:    Height as of this encounter: 1.626 m (5' 4\").    Weight as of this encounter: 65.3 kg (144 lb).      Wt Readings from Last 2 Encounters:   09/20/23 65.3 kg (144 lb)   01/29/19 67.6 kg (149 lb)       Gen: AAOX3, NAD, comfortable  HEENT:  no pallor  Resp: CTA B/L, normal WOB  CVS: RRR, no murmur  Abd/GI: Soft, non-tender. BS- normoactive.  Skin: Warm, dry no rashes  MSK: Right hip surgical incision is bandaged.  Neuro- CN- intact. No focal deficits.   Data   Recent Labs   Lab 09/22/23  0853 09/22/23  0802 09/22/23  0509 09/21/23  1219 09/21/23  0729 09/21/23  0248 " 09/21/23  0041   WBC  --  16.9*  --   --  9.4  --  11.8*   HGB  --  10.1*  --   --  10.3*  --  10.1*   MCV  --  94  --   --  93  --  91   PLT  --  250  --   --  210  --  233   NA  --  133*  --   --  134*  --  132*   POTASSIUM  --  4.3  --   --  4.2  --  4.3   CHLORIDE  --  98  --   --  99  --  97*   CO2  --  24  --   --  24  --  24   BUN  --  21.3  --   --  12.1  --  13.8   CR  --  0.87  --   --  0.73  --  0.70   ANIONGAP  --  11  --   --  11  --  11   DU  --  8.7*  --   --  8.8  --  8.8   * 126* 136*   < > 100*   < > 101*   ALBUMIN  --   --   --   --   --   --  3.5   PROTTOTAL  --   --   --   --   --   --  5.6*   BILITOTAL  --   --   --   --   --   --  0.4   ALKPHOS  --   --   --   --   --   --  209*   ALT  --   --   --   --   --   --  22   AST  --   --   --   --   --   --  24    < > = values in this interval not displayed.       Recent Results (from the past 24 hour(s))   XR Pelvis w Hip Port Right 1 View    Narrative    EXAM: XR PELVIS AND HIP PORTABLE RIGHT 1 VIEW  LOCATION: [facility]  DATE: 9/21/2023    INDICATION: Status post Hip surgery  COMPARISON: 09/20/2023      Impression    IMPRESSION: Cemented right total hip arthroplasty. Negative for postoperative purposes.     Medications    lactated ringers 100 mL/hr at 09/21/23 2114      acetaminophen  975 mg Oral Q8H    amitriptyline  25 mg Oral At Bedtime    aspirin  325 mg Oral Daily    insulin aspart  1-6 Units Subcutaneous Q4H    lisinopril  10 mg Oral Daily    metoprolol succinate ER  100 mg Oral Daily    polyethylene glycol  17 g Oral Daily    [Held by provider] predniSONE  8 mg Oral Daily    ROPivacaine (NAROPIN) 5 MG/ mg, EPINEPHrine (ADRENALIN) 0.6 mg in sodium chloride 0.9 % 50 mL (ORTHO RADHA CUSTOM DOSE)   INTRA-ARTICULAR On Call to OR    senna-docusate  1 tablet Oral BID    senna-docusate  2 tablet Oral TID    sodium chloride (PF)  3 mL Intracatheter Q8H    temazepam  15 mg Oral At Bedtime    cholecalciferol  50  mcg Oral Daily

## 2023-09-22 NOTE — PROGRESS NOTES
09/22/23 0900   Appointment Info   Signing Clinician's Name / Credentials (PT) Delilah Vance, SPT   Student Supervision Direct supervision provided;Direct Patient Contact Provided  (Dina Hadley, NICOLE)   Rehab Comments (PT) No R hip IR, ADD, flexion >90   Living Environment   People in Home alone   Current Living Arrangements hotel/motel   Home Accessibility no concerns   Transportation Anticipated family or friend will provide   Living Environment Comments Lives in [place], has been staying at a hotel for past several weeks. Is wanting to discharge back to [facility] with friend.   Self-Care   Usual Activity Tolerance good   Current Activity Tolerance fair   Regular Exercise No   Equipment Currently Used at Home walker, rolling;wheelchair, manual   Fall history within last six months no   Activity/Exercise/Self-Care Comment Has access to FWW and w/c   General Information   Onset of Illness/Injury or Date of Surgery 09/20/23   Referring Physician Dr. Fischer   Patient/Family Therapy Goals Statement (PT) To go back to hot   Pertinent History of Current Problem (include personal factors and/or comorbidities that impact the POC) Pt is a 78 year old female admitted with femoral neck fracture, now s/p right JULIANNE.   Existing Precautions/Restrictions fall;no hip IR;no hip ADD past midline;90 degree hip flexion   Weight-Bearing Status - RLE weight-bearing as tolerated   Cognition   Affect/Mental Status (Cognition) WFL   Orientation Status (Cognition) oriented x 4   Pain Assessment   Patient Currently in Pain   (4/10 right thigh)   Range of Motion (ROM)   ROM Comment R hip IR, Add, flexion due to hip precautions   Strength (Manual Muscle Testing)   Strength Comments Ankle DF: L 3+/5, R 3-/5, ankle PF: bilateral 3+/5, knee flexion: L 4/5, R 3-/5, knee extension L 4/5, 3-/5. hip flexion L 3+/5, R 3-/5   Bed Mobility   Bed Mobility supine-sit   Supine-Sit [place] (Bed Mobility) minimum assist (75% patient effort)    Impairments Contributing to Impaired Bed Mobility pain;decreased ROM;decreased strength   Comment, (Bed Mobility) Supine>sit w/min A x 1 to get R LE off bed.   Transfers   Transfers sit-stand transfer;bed-chair transfer   Transfer Safety Concerns Noted decreased balance during turns   Impairments Contributing to Impaired Transfers impaired balance;pain;decreased strength;decreased ROM;decreased sensation   Comment, (Transfers) Sit>stand w/FWW and Min A x1.   Bed-Chair Transfer   Assistive Device (Bed-Chair Transfers) walker, standard   Comment, (Bed-Chair Transfer) bed-chair w/mod A x 2   Sit-Stand Transfer   Sit-Stand [place] (Transfers) minimum assist (75% patient effort)   Assistive Device (Sit-Stand Transfers) walker, standard   Comment, (Sit-Stand Transfer) Sit>stand w/FWW and Min A x 1.   Balance   Balance Comments Pt steady in static sitting at EOB. Pt slightly unsteady in standing requiring FWW for increased stability.   Sensory Examination   Sensory Perception Comments Pt reports decreased sensation in R LE. Pt able to correctly locate light touch sensation on R ankle and foot, however presented with decreased sensation on toes.   Clinical Impression   Criteria for Skilled Therapeutic Intervention Yes, treatment indicated   PT Diagnosis (PT) Impaired functional mobility   Influenced by the following impairments Pain, decreased sensation, decreased ROM, decreased activity tolerance, decreased strength, impaired balance   Functional limitations due to impairments Bed mobility, transfers, gait, stairs   Clinical Presentation (PT Evaluation Complexity) Stable/Uncomplicated   Clinical Presentation Rationale Clinical judgement   Clinical Decision Making (Complexity) low complexity   Planned Therapy Interventions (PT) balance training;bed mobility training;patient/family education;stair training;strengthening;transfer training;progressive activity/exercise   Anticipated Equipment Needs at Discharge (PT)  walker, standard   Risk & Benefits of therapy have been explained evaluation/treatment results reviewed;care plan/treatment goals reviewed;risks/benefits reviewed;current/potential barriers reviewed;participants voiced agreement with care plan;participants included;patient   PT Total Evaluation Time   PT Eval, Low Complexity Minutes (22714) 12   Plan of Care Review   Plan of Care Reviewed With patient   Physical Therapy Goals   PT Frequency Daily   PT Predicted Duration/Target Date for Goal Attainment 09/29/23   PT Goals Bed Mobility;Transfers;Gait   PT: Bed Mobility Supervision/stand-by assist;Within precautions;Supine to/from sit   PT: Transfers Supervision/stand-by assist;Sit to/from stand;Assistive device;Within precautions   PT: Gait Supervision/stand-by assist;Standard walker;Assistive device;100 feet;Within precautions   Interventions   Interventions Quick Adds Therapeutic Activity   Therapeutic Activity   Therapeutic Activities: dynamic activities to improve functional performance Minutes (60455) 28   Symptoms Noted During/After Treatment Fatigue;Increased pain;Dizziness   Treatment Detail/Skilled Intervention Pt greeted supine in bed, agreed to PT. Pt educated in hip precautions and WBAT. Pt verbalized understanding. Pt performed supine>sit w/minAx1 to get R LE off of bed. Pt performed sit>stand x 2 w/FWW and Min A x 1 to get all the way standing up. Pt was cued for hand placement and R LE positioning. Pt able to correct cues. Pt reported dizziness in standing, BP taken WNL (112/74). Pt recovered sitting at EOB. Pt performed bed to chair transfer via sit>stand and 3 steps forward w/modAx2 and FWW. Pt was cued for FWW use and taking steps, pt had difficulty adhering to cues. Pt reported dizziness, BP taken (88/54). Pt was instructed through APs x 15 to increase blood flow, BP taken again and WNL.   PT Discharge Planning   PT Plan Review hip precautions; progress bed mobility; progress sit>stand w/FWW;  pre-gait activities/trial gait; monitor BP.   PT Discharge Recommendation (DC Rec) Transitional Care Facility   PT Rationale for DC Rec Pt is below baseline and currently requires assist of 2 for functional mobility. Pt has signficantly decreased activity tolerance and is currently unable to ambulate. Pt is currently residing in hotel alone and reports friend would come to stay with her for assistance. Anticipate that if pt progresses and is able to ambulate w/assist of 1, pt would be safe to go home with assist. Currently, recommend continued skilled PT via TCU to improve strength and functional mobility and progress activity tolerance.   PT Brief overview of current status Supine>sit w/Dyllan x1; sit>stand w/Dyllan x1; bed>chair transfer mod A x 2   Total Session Time   Timed Code Treatment Minutes 28   Total Session Time (sum of timed and untimed services) 40

## 2023-09-22 NOTE — PROGRESS NOTES
Orthopedic Surgery  Sixto Jimenez  09/22/2023     Admit Date:  9/20/2023    POD: 1 Day Post-Op   Procedure(s):  Right total hip arthroplasty    Patient resting comfortably in bed, eating breakfast.  Patient has multiple questions regarding her chronic prednisone use could have caused her fracture. Reviewed that this is likely the case and that she also had femoral head collapse from AVN.    Pain controlled to the right hip at rest. She states that the block seems to still be partially intact.  States that her right knee seems to be painful. Prior to her hospitalization she has had some knee pain and intermittent swelling.  Able to wiggle toes.  No calf pain or cramping.  Tolerating oral intake.    Denies nausea or vomiting.  Denies chest pain or shortness of breath.  No acute events overnight.    Temp:  [97  F (36.1  C)-98.2  F (36.8  C)] 98  F (36.7  C)  Pulse:  [78-92] 84  Resp:  [11-22] 18  BP: (106-155)/(64-98) 120/71  SpO2:  [93 %-99 %] 94 %    Alert and oriented.   Right hip dressing is clean, dry, and intact.   Minimal erythema and ecchymosis of the surrounding skin.   No knee joint effusion.  Mild right thigh swelling, compartments are soft and compressible.  Bilateral calves are soft, non-tender.  Nontender over the medial and lateral knee joint lines, popliteal fossa, and patella.  Right lower extremity is NVI.  Patient able to resist ankle dorsiflexion and plantar flexion bilaterally.  DP pulse palpable.  Sensation intact bilateral lower extremities.    Labs/Imaging:  Recent Labs   Lab Test 09/22/23  0802 09/21/23  0729 09/21/23  0041   WBC 16.9* 9.4 11.8*   HGB 10.1* 10.3* 10.1*    210 233     A/P    1. S/p right total hip arthroplasty for avascular necrosis and femoral neck fracture  -Discussed pain level expectations and anticipated postoperative course with the patient. All questions and concerns answered to her satisfaction at this time.  -Continue  mg daily for DVT prophylaxis.     -Mobilize with PT/OT.  -WBAT RLE with walker.  -Right hip posterior precautions (no IR, flexion >90 degrees, adduction past midline).  -Continue current pain regimen.  -Dressings: Keep intact. Okay for RN to change if >60% saturated or peeling/falling off.   -Follow-up: 2 weeks post-op with Dr. Luis Sanchez/Kaur Wright PA-C    *Patient reporting right knee pain and is interested in radiographs. I do not suspect any acute bony pathology, but we will obtain baseline x-rays today. Order placed.    2. Disposition  -Anticipate d/c to TCU vs home with family when medically cleared and progressing in PT.    Tianna Terry PA-C  [facility]

## 2023-09-22 NOTE — PLAN OF CARE
Goal Outcome Evaluation:    Diagnosis: Right Total Hip Arthoplasty  POD#:1  Mental Status: Alert & oriented x4  VS/O2: VSS on RA  Drains: PIV SL  Activity/dangle: A-2 GBW with PT  Diet: Regular diet- , 103, & 123  Pain: PRN tylenol and oxycodone given for pain  Marrero/Voiding: Up to BR  Tele/Restraints/Iso: NA  D/C Date: Pending, PT recommended TCU  Other Info: CMS intact except numbness reported to RLE, Knee pain, dressing CDI, WBAT to the RLE. Patient c/o dizziness upon standing up, constipation- PRN senna given.

## 2023-09-23 ENCOUNTER — APPOINTMENT (OUTPATIENT)
Dept: PHYSICAL THERAPY | Facility: CLINIC | Age: 78
DRG: 522 | End: 2023-09-23
Payer: MEDICARE

## 2023-09-23 LAB
ERYTHROCYTE [DISTWIDTH] IN BLOOD BY AUTOMATED COUNT: 15.7 % (ref 10–15)
GLUCOSE BLDC GLUCOMTR-MCNC: 106 MG/DL (ref 70–99)
GLUCOSE BLDC GLUCOMTR-MCNC: 108 MG/DL (ref 70–99)
GLUCOSE BLDC GLUCOMTR-MCNC: 113 MG/DL (ref 70–99)
GLUCOSE BLDC GLUCOMTR-MCNC: 119 MG/DL (ref 70–99)
GLUCOSE BLDC GLUCOMTR-MCNC: 134 MG/DL (ref 70–99)
HCT VFR BLD AUTO: 28.7 % (ref 35–47)
HGB BLD-MCNC: 9.4 G/DL (ref 11.7–15.7)
MCH RBC QN AUTO: 30.3 PG (ref 26.5–33)
MCHC RBC AUTO-ENTMCNC: 32.8 G/DL (ref 31.5–36.5)
MCV RBC AUTO: 93 FL (ref 78–100)
PLATELET # BLD AUTO: 199 10E3/UL (ref 150–450)
RBC # BLD AUTO: 3.1 10E6/UL (ref 3.8–5.2)
WBC # BLD AUTO: 12.7 10E3/UL (ref 4–11)

## 2023-09-23 PROCEDURE — 97116 GAIT TRAINING THERAPY: CPT | Mod: GP

## 2023-09-23 PROCEDURE — 85027 COMPLETE CBC AUTOMATED: CPT | Performed by: INTERNAL MEDICINE

## 2023-09-23 PROCEDURE — 99207 PR NO BILLABLE SERVICE THIS VISIT: CPT | Performed by: INTERNAL MEDICINE

## 2023-09-23 PROCEDURE — 120N000001 HC R&B MED SURG/OB

## 2023-09-23 PROCEDURE — 250N000013 HC RX MED GY IP 250 OP 250 PS 637: Performed by: PHYSICIAN ASSISTANT

## 2023-09-23 PROCEDURE — 97530 THERAPEUTIC ACTIVITIES: CPT | Mod: GP

## 2023-09-23 PROCEDURE — 99239 HOSP IP/OBS DSCHRG MGMT >30: CPT | Performed by: INTERNAL MEDICINE

## 2023-09-23 PROCEDURE — 250N000013 HC RX MED GY IP 250 OP 250 PS 637: Performed by: HOSPITALIST

## 2023-09-23 PROCEDURE — 36415 COLL VENOUS BLD VENIPUNCTURE: CPT | Performed by: INTERNAL MEDICINE

## 2023-09-23 PROCEDURE — 250N000012 HC RX MED GY IP 250 OP 636 PS 637: Performed by: INTERNAL MEDICINE

## 2023-09-23 PROCEDURE — 250N000013 HC RX MED GY IP 250 OP 250 PS 637: Performed by: ORTHOPAEDIC SURGERY

## 2023-09-23 PROCEDURE — 250N000013 HC RX MED GY IP 250 OP 250 PS 637: Performed by: INTERNAL MEDICINE

## 2023-09-23 RX ORDER — AMOXICILLIN 250 MG
2 CAPSULE ORAL 3 TIMES DAILY
Qty: 90 TABLET | Refills: 0 | DISCHARGE
Start: 2023-09-23

## 2023-09-23 RX ORDER — LORAZEPAM 0.5 MG/1
0.25 TABLET ORAL DAILY PRN
Qty: 10 TABLET | Refills: 0 | Status: SHIPPED | DISCHARGE
Start: 2023-09-23 | End: 2023-09-27

## 2023-09-23 RX ORDER — BISACODYL 10 MG
10 SUPPOSITORY, RECTAL RECTAL DAILY PRN
DISCHARGE
Start: 2023-09-23

## 2023-09-23 RX ORDER — PREDNISONE 1 MG/1
8 TABLET ORAL DAILY
DISCHARGE
Start: 2023-09-23

## 2023-09-23 RX ORDER — SENNOSIDES 8.6 MG
2 TABLET ORAL 3 TIMES DAILY
Status: DISCONTINUED | OUTPATIENT
Start: 2023-09-23 | End: 2023-09-24 | Stop reason: HOSPADM

## 2023-09-23 RX ORDER — TEMAZEPAM 15 MG/1
15 CAPSULE ORAL AT BEDTIME
Qty: 10 CAPSULE | Refills: 0 | Status: SHIPPED | DISCHARGE
Start: 2023-09-23 | End: 2023-09-25 | Stop reason: DRUGHIGH

## 2023-09-23 RX ORDER — POLYETHYLENE GLYCOL 3350 17 G/17G
17 POWDER, FOR SOLUTION ORAL 2 TIMES DAILY
Status: DISCONTINUED | OUTPATIENT
Start: 2023-09-23 | End: 2023-09-24 | Stop reason: HOSPADM

## 2023-09-23 RX ORDER — BISACODYL 10 MG
10 SUPPOSITORY, RECTAL RECTAL ONCE
Status: COMPLETED | OUTPATIENT
Start: 2023-09-23 | End: 2023-09-23

## 2023-09-23 RX ADMIN — ACETAMINOPHEN 975 MG: 325 TABLET, FILM COATED ORAL at 05:45

## 2023-09-23 RX ADMIN — METOPROLOL SUCCINATE 100 MG: 100 TABLET, EXTENDED RELEASE ORAL at 20:00

## 2023-09-23 RX ADMIN — Medication 50 MCG: at 08:34

## 2023-09-23 RX ADMIN — SENNOSIDES AND DOCUSATE SODIUM 2 TABLET: 50; 8.6 TABLET ORAL at 08:34

## 2023-09-23 RX ADMIN — AMITRIPTYLINE HYDROCHLORIDE 25 MG: 25 TABLET, FILM COATED ORAL at 20:05

## 2023-09-23 RX ADMIN — SENNOSIDES AND DOCUSATE SODIUM 2 TABLET: 50; 8.6 TABLET ORAL at 14:12

## 2023-09-23 RX ADMIN — SENNOSIDES 2 TABLET: 8.6 TABLET, FILM COATED ORAL at 16:40

## 2023-09-23 RX ADMIN — LORAZEPAM 0.25 MG: 0.5 TABLET ORAL at 14:12

## 2023-09-23 RX ADMIN — SENNOSIDES AND DOCUSATE SODIUM 1 TABLET: 50; 8.6 TABLET ORAL at 10:18

## 2023-09-23 RX ADMIN — ACETAMINOPHEN 975 MG: 325 TABLET, FILM COATED ORAL at 14:08

## 2023-09-23 RX ADMIN — BISACODYL 10 MG: 10 SUPPOSITORY RECTAL at 11:03

## 2023-09-23 RX ADMIN — SENNOSIDES AND DOCUSATE SODIUM 2 TABLET: 50; 8.6 TABLET ORAL at 20:00

## 2023-09-23 RX ADMIN — ASPIRIN 325 MG: 325 TABLET, COATED ORAL at 08:34

## 2023-09-23 RX ADMIN — PREDNISONE 8 MG: 2.5 TABLET ORAL at 08:34

## 2023-09-23 RX ADMIN — POLYETHYLENE GLYCOL 3350 17 G: 17 POWDER, FOR SOLUTION ORAL at 08:35

## 2023-09-23 RX ADMIN — MAGNESIUM HYDROXIDE 30 ML: 400 SUSPENSION ORAL at 10:18

## 2023-09-23 RX ADMIN — ACETAMINOPHEN 975 MG: 325 TABLET, FILM COATED ORAL at 19:59

## 2023-09-23 RX ADMIN — TEMAZEPAM 15 MG: 15 CAPSULE ORAL at 20:05

## 2023-09-23 RX ADMIN — LISINOPRIL 10 MG: 10 TABLET ORAL at 08:35

## 2023-09-23 ASSESSMENT — ACTIVITIES OF DAILY LIVING (ADL)
ADLS_ACUITY_SCORE: 44
ADLS_ACUITY_SCORE: 49
ADLS_ACUITY_SCORE: 44
ADLS_ACUITY_SCORE: 48
ADLS_ACUITY_SCORE: 44
ADLS_ACUITY_SCORE: 44
ADLS_ACUITY_SCORE: 48
ADLS_ACUITY_SCORE: 44
ADLS_ACUITY_SCORE: 44
ADLS_ACUITY_SCORE: 48
ADLS_ACUITY_SCORE: 44
ADLS_ACUITY_SCORE: 44

## 2023-09-23 NOTE — PROGRESS NOTES
Care Management Discharge Note    Discharge Date: 09/23/2023       Discharge Disposition: Transitional Care    Discharge Services: None    Discharge DME: None    Discharge Transportation: family or friend will provide    Private pay costs discussed: private room/amenity fees    Does the patient's insurance plan have a 3 day qualifying hospital stay waiver?  No    PAS Confirmation Code: [id number]  Patient/family educated on Medicare website which has current facility and service quality ratings: yes    Education Provided on the Discharge Plan: Yes  Persons Notified of Discharge Plans: Patient   Patient/Family in Agreement with the Plan:      Handoff Referral Completed: Yes    Additional Information:  Patient will be discharging today at noon to [facility] on [address]. Patients friend will be transporting through the skyway. Writer faxed scripts and orders. PAS completed. Patient understands that she will be private pay for TCU. Per Lin at [facility], they will run patients insurance and if it comes back approved they will give patients deposit back. Writer then was updated by  that patient can discharge if she has a BM. Writer asked Magdalene at [facility] if they could hold the bed until tomorrow if need be. Magdalene states yes. Per Magdalene, they can accept up till 7pm tonight. SW will continue to follow     PAS-RR    D: Per DHS regulation, SW completed and submitted PAS-RR to [place] [employer] Direct Connect via the Senior LinkAge Line.  PAS-RR confirmation # is : [id number]    P: Further questions may be directed to Senior LinkAge Line at #[phone], option #4 for PAS-RR staff.      SHRUTHI Cosme

## 2023-09-23 NOTE — PLAN OF CARE
Goal Outcome Evaluation:    Diagnosis: L JULIANNE  POD#: 2  Mental Status: A&Ox4  Activity/dangle up 2 GB walker  Diet: reg  Pain: oxycodone, tylenol  Marrero/Voiding: kaitlyn PRIETO  02/LDA: RA. IV SL  D/C Date: pending TCU  Other Info: Dressing-scant dried drainage.Cms- numbness R knee                 H&P reviewed. After examining the patient I find no changes in the patients condition since the H&P had been written.     Vitals:    08/01/23 0945   BP: 111/64   Pulse: 89   Resp: (!) 11   Temp: 99.3 °F (37.4 °C)   SpO2: 97%

## 2023-09-23 NOTE — CARE PLAN
Occupational Therapy Discharge Summary    Reason for therapy discharge:    Discharged to transitional care facility.    Progress towards therapy goal(s). See goals on Care Plan in [facility] electronic health record for goal details.  Goals not met.  Barriers to achieving goals:   discharge from facility.    Therapy recommendation(s):    Continued therapy is recommended.  Rationale/Recommendations:  To maximize IND in ADL/IADL.

## 2023-09-23 NOTE — PROGRESS NOTES
Orthopedic Surgery  Sixto SUAREZ Barbara  09/23/2023     Admit Date:  9/20/2023    POD: 2 Days Post-Op   Procedure(s):  Right total hip arthroplasty    Patient resting up ion chair.   Pain controlled to the right hip at rest. States she has localized numbness to the right knee, we discuss immobilization and stiffness. Her sensation was intact throughout remainder of leg. She reports she had a spinal injection with numbness after that lasted a number of weeks. I encourage mobilization with PT at this time.   Ranges toes  No calf pain or cramping.  Tolerating oral intake.    Denies nausea or vomiting.  Denies chest pain or shortness of breath.  No acute events overnight.    Temp:  [98.4  F (36.9  C)-98.9  F (37.2  C)] 98.7  F (37.1  C)  Pulse:  [] 82  Resp:  [16-18] 18  BP: (107-148)/(62-77) 112/74  SpO2:  [91 %-93 %] 91 %    Alert and oriented.   Right hip dressing is clean, dry, and intact.   Minimal erythema and ecchymosis of the surrounding skin.   No knee joint effusion.  Mild right thigh swelling, compartments are soft and compressible.  Bilateral calves are soft, non-tender.  Right lower extremity is NVI.  Patient able to resist ankle dorsiflexion and plantar flexion bilaterally.  DP pulse palpable.  Sensation intact bilateral lower extremities.    Labs/Imaging:  Recent Labs   Lab Test 09/23/23  0750 09/22/23  0802 09/21/23  0729   WBC 12.7* 16.9* 9.4   HGB 9.4* 10.1* 10.3*    250 210     Knee XR  IMPRESSION: No fracture, effusion or calcified intra-articular body.  Degenerative changes are similar in appearance. Mild lateral  subluxation and tilt of the patella.      A/P    1. S/p right total hip arthroplasty for avascular necrosis and femoral neck fracture  -Continue  mg daily for DVT prophylaxis.    -Mobilize with PT/OT.  -WBAT RLE with walker.  -Right hip posterior precautions (no IR, flexion >90 degrees, adduction past midline).  -Continue current pain regimen.  -Dressings: Keep intact. Okay  for RN to change if >60% saturated or peeling/falling off.   -Follow-up: 2 weeks post-op with Dr. Luis Sanchez/Kaur Wright PA-C        2. Disposition  -Anticipate d/c to TCU when medically cleared and progressing in PT.      [facility]

## 2023-09-23 NOTE — PLAN OF CARE
Goal Outcome Evaluation:  Trauma/Ortho/Medical (Choose one)  trauma  Diagnosis: right femoral neck fracture - JULIANNE  POD#: 2  Mental Status:A/Ox4  Activity/dangle up with one assist/GB/walker - slow moving. Kate-steady was helpful at times.  Diet: regular  Pain: scheduled Tylenol  Marrero/Voiding: bathroom  02/LDA: CECE/MENDEZ  D/C Date: 9/24 [facility]  Other Info: Patient had large BM this afternoon after receiving PRN MOM and suppository. PRN Ativan given for anxiety.

## 2023-09-23 NOTE — PROGRESS NOTES
[facility]    Medicine Progress Note - Hospitalist Service        Date of Admission:  9/20/2023  8:26 PM    Assessment & Plan:   Sixto Jimenez is a markedly pleasant 78 year old woman with past medical history that is most notable for chronic Prednisone use for initially suspected PMR as well as osteoporosis and chronic back pain, among others; who presents with acute on chronic pain in the right hip, knee and back and is found to have an acute, displaced subcapital fracture of the right femoral neck.     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  -Of note, Ms. Jimenez takes daily Prednisone for presumed PMR and is currently on a prolonged taper; now at 8 mg daily. The therapy began in 4/2023. She also has osteoporosis: her most recent DEXA scan in 8/2023 was most notable for reportedly -2.3 T score in the left femoral neck. The right femoral neck was -1.8 and the right total hip was -1.2.   -she presents for acute on chronic pain in the right hip, knee and back.   -No fall although she twisted her right lower extremity while walking with the help of a walker just prior to presentation  -X-ray reveals an acute displaced subcapital fracture of the right femoral neck  -Orthopedic surgery consulted, patient underwent right total hip arthroplasty yesterday.  Intraoperatively was found to have AVN.  Please see discussion regarding corticosteroid below.  -Defer routine postop care to orthopedic surgery.  Pain control and DVT prophylaxis  -PT and OT as able    Acute blood loss anemia from the fracture and surgery;  Hemoglobin dropped from 10.3-9.4    History of PMR  -Follows up with Dr. Minerva Vilchis, rheumatology as outpatient  -Patient initially was suspected of having PMR and treated with corticosteroid although her rheumatologist now does not think that she has PMR and she is on a prolonged prednisone taper.    -Currently on 8 mg  daily with plan to decrease by 1 mg every 2 weeks  -She received stress dose hydrocortisone 25 mg 3 times daily yesterday  -Resume prior to admission prednisone 8 mg daily starting tomorrow  -I called 's office to discuss about potentially tapering her corticosteroid quickly given finding of AVN and patient's concern.  She was off today, discussed with , who recommended discussing with Dr. Vilchis on Monday regarding this plan as she was not entirely sure her indication for corticosteroid based on his chart review.  -If patient is still in the hospital until Monday then this can be followed up by the team on Monday otherwise if she is discharged before that then the patient will follow-up herself and discussed with Dr. Vilchis.  This was discussed with Sixto today and she is in agreement with the plan.    Leukocytosis  -Patient had a bump in her WBC count this morning, suspect this is due to stress dose of Solu-Cortef she received yesterday  -Recheck in the morning.     Hypertension  Blood pressure is soft systolics in the 110s  Stop PTA lisinopril  Continued PTA metoprolol     Chronic back pain  -Prior to admission on aspirin 650 mg 2 times daily and Norco  -Currently on oxycodone and Dilaudid due to above fracture.    Chronic anxiety  -Continue prior to admission Elavil, prn Ativan, night time Temazepam      History of colon cancer  -Noted    Constipation;  Patient takes senna 2 tablets 3 times a day  Has been taking MiraLAX  Severe constipation issues prior to admission  Dulcolax suppository ordered today      Diet: Advance Diet as Tolerated: Regular Diet Adult  Diet     DVT Prophylaxis: Pneumatic Compression Devices   Marrero Catheter: Not present  Code Status: Full Code     Disposition Plan       Expected Discharge Date: 09/23/2023, 12:00 PM    Destination: home with family        Entered: Jade Navas MD 09/23/2023, 11:55 AM        Clinically Significant Risk Factors                      #  "Hypertension: Noted on problem list                     The patient's care was discussed with the Bedside Nurse and Patient.    Medical Decision Making       **CLEAR ALL SELECTIONS**      Labs/Imaging Reviewed:  See Information above and Data section below    Time SPENT BY ME on the date of service doing chart review, history, exam, documentation & further activities per the note:  35 MINUTES    Jade Navas MD   Pager  [phone](7AM-6PM)      ______________________________________________________________________    Interval History     Data reviewed today: I reviewed all medications, new labs and imaging results over the last 24 hours. I personally reviewed no images or EKG's today.    Physical Exam   Vital signs:  Temp: 98.7  F (37.1  C) Temp src: Oral BP: 112/74 Pulse: 82   Resp: 18 SpO2: 91 % O2 Device: None (Room air) Oxygen Delivery: 2 LPM Height: 162.6 cm (5' 4\") Weight: 65.3 kg (144 lb)  Estimated body mass index is 24.72 kg/m  as calculated from the following:    Height as of this encounter: 1.626 m (5' 4\").    Weight as of this encounter: 65.3 kg (144 lb).      Wt Readings from Last 2 Encounters:   09/20/23 65.3 kg (144 lb)   01/29/19 67.6 kg (149 lb)       Gen: AAOX3, NAD, comfortable  HEENT:  no pallor  Resp: CTA B/L, normal WOB  CVS: RRR, no murmur  Abd/GI: Soft, non-tender. BS- normoactive.  Skin: Warm, dry no rashes  MSK: Right hip surgical incision is bandaged.  Neuro- CN- intact. No focal deficits.   Data   Recent Labs   Lab 09/23/23  1133 09/23/23  0750 09/23/23  0611 09/23/23  0218 09/22/23  0853 09/22/23  0802 09/21/23  1219 09/21/23  0729 09/21/23  0248 09/21/23  0041   WBC  --  12.7*  --   --   --  16.9*  --  9.4  --  11.8*   HGB  --  9.4*  --   --   --  10.1*  --  10.3*  --  10.1*   MCV  --  93  --   --   --  94  --  93  --  91   PLT  --  199  --   --   --  250  --  210  --  233   NA  --   --   --   --   --  133*  --  134*  --  132*   POTASSIUM  --   --   --   --   --  4.3  --  4.2  --  4.3 "   CHLORIDE  --   --   --   --   --  98  --  99  --  97*   CO2  --   --   --   --   --  24  --  24  --  24   BUN  --   --   --   --   --  21.3  --  12.1  --  13.8   CR  --   --   --   --   --  0.87  --  0.73  --  0.70   ANIONGAP  --   --   --   --   --  11  --  11  --  11   DU  --   --   --   --   --  8.7*  --  8.8  --  8.8   *  --  119* 108*   < > 126*   < > 100*   < > 101*   ALBUMIN  --   --   --   --   --   --   --   --   --  3.5   PROTTOTAL  --   --   --   --   --   --   --   --   --  5.6*   BILITOTAL  --   --   --   --   --   --   --   --   --  0.4   ALKPHOS  --   --   --   --   --   --   --   --   --  209*   ALT  --   --   --   --   --   --   --   --   --  22   AST  --   --   --   --   --   --   --   --   --  24    < > = values in this interval not displayed.         Recent Results (from the past 24 hour(s))   XR Knee Right 3 Views    Narrative    KNEE RIGHT THREE VIEWS September 22, 2023 1:15 PM     HISTORY: Right knee pain, evaluate for any bony trauma/abnormalities.    COMPARISON: Radiographs from 9/20/2023.      Impression    IMPRESSION: No fracture, effusion or calcified intra-articular body.  Degenerative changes are similar in appearance. Mild lateral  subluxation and tilt of the patella.    YASMIN LOCKWOOD MD         SYSTEM ID:  [id number]     Medications    lactated ringers 100 mL/hr at 09/21/23 2114      acetaminophen  975 mg Oral Q8H    amitriptyline  25 mg Oral At Bedtime    aspirin  325 mg Oral Daily    insulin aspart  1-7 Units Subcutaneous TID AC    insulin aspart  1-5 Units Subcutaneous At Bedtime    metoprolol succinate ER  100 mg Oral Daily    polyethylene glycol  17 g Oral Daily    predniSONE  8 mg Oral Daily    senna-docusate  1 tablet Oral BID    senna-docusate  2 tablet Oral TID    sodium chloride (PF)  3 mL Intracatheter Q8H    temazepam  15 mg Oral At Bedtime    cholecalciferol  50 mcg Oral Daily

## 2023-09-23 NOTE — DISCHARGE SUMMARY
[facility]  Hospitalist Discharge Summary      Date of Admission:  9/20/2023  Date of Discharge:  9/24/2023  Discharging Provider: Jade Navas MD  Discharge Service: Hospitalist Service    Discharge Diagnoses   Please see hospital course     Clinically Significant Risk Factors          Follow-ups Needed After Discharge   Follow-up Appointments     Follow Up and recommended labs and tests      Follow-up with Dr. Sanchez/Kaur Wright PA-C ([facility])   at 2 weeks postop for recheck, repeat x-rays, and wound evaluation   (suture/staple removal). No need for follow up labs or testing prior to   this appointment.     Please contact Dr. Sanchez's care coordinator, Bhumika, at [phone] to   schedule or for any questions related to your orthopedic injury/surgery.    [facility] [place] After Hours Phone: [phone]    F/u with NH physician in 1week. Recheck CBC, BMP in 1week           Unresulted Labs Ordered in the Past 30 Days of this Admission       Date and Time Order Name Status Description    9/21/2023  4:59 PM Surgical Pathology Exam In process             Discharge Disposition   Discharged to short-term care facility  Condition at discharge: Stable    Hospital Course   Expand All Collapse All    [facility]     Medicine Progress Note - Hospitalist Service        Date of Admission:  9/20/2023  8:26 PM     Assessment & Plan:   Sixto Jimenez is a markedly pleasant 78 year old woman with past medical history that is most notable for chronic Prednisone use for initially suspected PMR as well as osteoporosis and chronic back pain, among others; who presents with acute on chronic pain in the right hip, knee and back and is found to have an acute, displaced subcapital fracture of the right femoral neck.     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  -Of note, Ms. Jimenez  takes daily Prednisone for presumed PMR and is currently on a prolonged taper; now at 8 mg daily. The therapy began in 4/2023. She also has osteoporosis: her most recent DEXA scan in 8/2023 was most notable for reportedly -2.3 T score in the left femoral neck. The right femoral neck was -1.8 and the right total hip was -1.2.   -she presents for acute on chronic pain in the right hip, knee and back.   -No fall although she twisted her right lower extremity while walking with the help of a walker just prior to presentation  -X-ray reveals an acute displaced subcapital fracture of the right femoral neck  -Orthopedic surgery consulted, patient underwent right total hip arthroplasty yesterday.  Intraoperatively was found to have AVN.  Please see discussion regarding corticosteroid below.  -Defer routine postop care to orthopedic surgery.  Pain control and DVT prophylaxis  -PT and OT as able     Acute blood loss anemia from the fracture and surgery;  Hemoglobin dropped from 10.3-9.4     History of PMR  -Follows up with Dr. Minerva Vilchis, rheumatology as outpatient  -Patient initially was suspected of having PMR and treated with corticosteroid although her rheumatologist now does not think that she has PMR and she is on a prolonged prednisone taper.    -Currently on 8 mg daily with plan to decrease by 1 mg every 2 weeks  -She received stress dose hydrocortisone 25 mg 3 times daily yesterday  -Resume prior to admission prednisone 8 mg daily starting tomorrow  -I called 's office to discuss about potentially tapering her corticosteroid quickly given finding of AVN and patient's concern.  She was off today, discussed with , who recommended discussing with Dr. Vilchis on Monday regarding this plan as she was not entirely sure her indication for corticosteroid based on his chart review.  -If patient is still in the hospital until Monday then this can be followed up by the team on Monday otherwise if she is discharged  before that then the patient will follow-up herself and discussed with Dr. Vilchis.  This was discussed with Sixto today and she is in agreement with the plan.     Leukocytosis  -Patient had a bump in her WBC count this morning, suspect this is due to stress dose of Solu-Cortef she received yesterday  -Recheck in the morning.     Hypertension  Blood pressure is soft systolics in the 110s  Stop PTA lisinopril  Continued PTA metoprolol     Chronic back pain  -Prior to admission on aspirin 650 mg 2 times daily and Norco  -Currently on oxycodone and Dilaudid due to above fracture.     Chronic anxiety  -Continue prior to admission Elavil, prn Ativan, night time Temazepam      History of colon cancer  -Noted     Constipation;  Patient takes senna 2 tablets 3 times a day  Has been taking MiraLAX  Severe constipation issues prior to admission  Dulcolax suppository ordered   Pt had a bm on 9/23/23        Diet: Advance Diet as Tolerated: Regular Diet Adult  Diet     DVT Prophylaxis: Pneumatic Compression Devices   Marrero Catheter: Not present  Code Status: Full Code        Disposition Plan     Expected Discharge Date: 09/23/2023, 12:00 PM    Destination: home with family        Entered: Jade Navas MD 09/23/2023, 11:55 AM                   Consultations This Hospital Stay   ORTHOPEDIC SURGERY IP CONSULT  CARE MANAGEMENT / SOCIAL WORK IP CONSULT  HOSPITALIST IP CONSULT  PHYSICAL THERAPY ADULT IP CONSULT  OCCUPATIONAL THERAPY ADULT IP CONSULT  PHYSICAL THERAPY ADULT IP CONSULT  OCCUPATIONAL THERAPY ADULT IP CONSULT  HOSPITALIST IP CONSULT  PHYSICAL THERAPY ADULT IP CONSULT  OCCUPATIONAL THERAPY ADULT IP CONSULT    Code Status   Full Code    Time Spent on this Encounter   I, Jade Navas MD, personally saw the patient today and spent greater than 30 minutes discharging this patient.       Jade Navas MD  [facility]  [address] [place] [id number]  Phone: [phone]  Fax:  [phone]  ______________________________________________________________________    Physical Exam   Vital Signs: Temp: 98.7  F (37.1  C) Temp src: Oral BP: 112/74 Pulse: 82   Resp: 18 SpO2: 91 % O2 Device: None (Room air)    Weight: 144 lbs 0 oz    Gen: AAOX3, NAD, comfortable  HEENT:  no pallor  Resp: CTA B/L, normal WOB  CVS: RRR, no murmur  Abd/GI: Soft, non-tender. BS- normoactive.  Skin: Warm, dry no rashes  MSK: Right hip surgical incision is bandaged.  Neuro- CN- intact. No focal deficits.        Primary Care Physician   JOANNE BRAGG    Discharge Orders      General info for SNF    Length of Stay Estimate: Short Term Care: Estimated # of Days <30  Condition at Discharge: Stable  Level of care:skilled   Rehabilitation Potential: Good  Admission H&P remains valid and up-to-date: Yes  Recent Chemotherapy: N/A  Use Nursing Home Standing Orders: Yes     Mantoux instructions    Give two-step Mantoux (PPD) Per Facility Policy Yes     Reason for your hospital stay    S/p right JULIANNE for a femoral neck fracture and AVN (DOS: 9/21/23)     Wound care    Site: Right hip  Instructions:  Please leave dressing intact for 2 weeks postoperatively. Okay to change if saturated >60% or peeling. If an Aquacel or gauze/tegaderm is in place over your surgical sites, it is okay to shower without covering the dressings. If you have a soft dressing, you must securely cover your dressing while showering or sponge bathe. Absolutely no soaking or submersion. Please contact your orthopedic surgical team if persistent drainage or redness develops around the surgical site.     Activity - Up with assistive device     Activity - Up with nursing assistance     Weight bearing status    WBAT RLE with walker     Follow Up and recommended labs and tests    Follow-up with Dr. Sanchez/Kaur Wright PA-C ([facility]) at 2 weeks postop for recheck, repeat x-rays, and wound evaluation (suture/staple removal). No need for follow up labs  or testing prior to this appointment.     Please contact Dr. Sanchez's care coordinator, Bhumika, at [phone] to schedule or for any questions related to your orthopedic injury/surgery.    [facility] [place] After Hours Phone: [phone]    F/u with NH physician in 1week. Recheck CBC, BMP in 1week     Physical Therapy Adult Consult    Evaluate and treat as clinically indicated.    Reason: S/p right JULIANNE for a femoral neck fracture and AVN (DOS: 9/21/23)     Occupational Therapy Adult Consult    Evaluate and treat as clinically indicated.    Reason: S/p right JULIANNE for a femoral neck fracture and AVN (DOS: 9/21/23)     Fall precautions     Hip precautions    Right hip posterior precautions: no IR, flexion >90 degrees, or adduction past midline     Crutches DME    DME Documentation: Describe the reason for need to support medical necessity: Impaired gait status post hip surgery. I, the undersigned, certify that the above prescribed supplies are medically necessary for this patient and is both reasonable and necessary in reference to accepted standards of medical practice in the treatment of this patient's condition and is not prescribed as a convenience.     Cane DME    DME Documentation: Describe the reason for need to support medical necessity: Impaired gait status post hip surgery. I, the undersigned, certify that the above prescribed supplies are medically necessary for this patient and is both reasonable and necessary in reference to accepted standards of medical practice in the treatment of this patient's condition and is not prescribed as a convenience.     Walker DME    : DME Documentation: Describe the reason for need to support medical necessity: Impaired gait status post hip surgery. I, the undersigned, certify that the above prescribed supplies are medically necessary for this patient and is both reasonable and necessary in reference to accepted standards of medical practice in the  treatment of this patient's condition and is not prescribed as a convenience.     Diet    Follow this diet upon discharge: Orders Placed This Encounter      Advance Diet as Tolerated: Regular Diet Adult       Significant Results and Procedures   Most Recent 3 CBC's:  Recent Labs   Lab Test 09/23/23  0750 09/22/23  0802 09/21/23  0729   WBC 12.7* 16.9* 9.4   HGB 9.4* 10.1* 10.3*   MCV 93 94 93    250 210     Most Recent 3 BMP's:  Recent Labs   Lab Test 09/23/23  1133 09/23/23  0611 09/23/23  0218 09/22/23  0853 09/22/23  0802 09/21/23  1219 09/21/23  0729 09/21/23  0248 09/21/23  0041   NA  --   --   --   --  133*  --  134*  --  132*   POTASSIUM  --   --   --   --  4.3  --  4.2  --  4.3   CHLORIDE  --   --   --   --  98  --  99  --  97*   CO2  --   --   --   --  24  --  24  --  24   BUN  --   --   --   --  21.3  --  12.1  --  13.8   CR  --   --   --   --  0.87  --  0.73  --  0.70   ANIONGAP  --   --   --   --  11  --  11  --  11   DU  --   --   --   --  8.7*  --  8.8  --  8.8   * 119* 108*   < > 126*   < > 100*   < > 101*    < > = values in this interval not displayed.     Most Recent 2 LFT's:  Recent Labs   Lab Test 09/21/23  0041   AST 24   ALT 22   ALKPHOS 209*   BILITOTAL 0.4   ,   Results for orders placed or performed during the hospital encounter of 09/20/23   XR Pelvis 1/2 Views    Narrative    EXAM: XR PELVIS 1/2 VIEWS  LOCATION: [facility]  DATE: 9/20/2023    INDICATION: fall, pelvic pain  COMPARISON: Pelvis radiographs 08/02/2023      Impression    IMPRESSION: Displaced fracture of the right femoral neck. No dislocation. No additional pelvic fracture. Degenerative changes of the pubic symphysis and hips. Pelvic phleboliths.   XR Femur Right 2 Views    Narrative    EXAM: XR FEMUR RIGHT 2 VIEWS  LOCATION: [facility]  DATE: 9/20/2023    INDICATION: fall, right thigh pain  COMPARISON: 08/02/2023      Impression    IMPRESSION: There is an acute,  displaced subcapital fracture of the right femoral neck. The femoral head remains seated within the acetabulum. The distal femur is intact.   XR Knee Right 1/2 Views    Narrative    EXAM: XR KNEE RIGHT 1/2 VIEWS  LOCATION: [facility]  DATE: 9/20/2023    INDICATION: fall, right knee pain  COMPARISON: 08/07/2023      Impression    IMPRESSION: Stable tricompartmental osteoarthritis, most severe in the medial and patellofemoral compartments. Tiny amount of suprapatellar knee joint fluid, but no evidence of a displaced fracture. Small superior pole patellar enthesophyte at the distal   quadriceps insertion.   XR Pelvis w Hip Port Right 1 View    Narrative    EXAM: XR PELVIS AND HIP PORTABLE RIGHT 1 VIEW  LOCATION: [facility]  DATE: 9/21/2023    INDICATION: Status post Hip surgery  COMPARISON: 09/20/2023      Impression    IMPRESSION: Cemented right total hip arthroplasty. Negative for postoperative purposes.   XR Knee Right 3 Views    Narrative    KNEE RIGHT THREE VIEWS September 22, 2023 1:15 PM     HISTORY: Right knee pain, evaluate for any bony trauma/abnormalities.    COMPARISON: Radiographs from 9/20/2023.      Impression    IMPRESSION: No fracture, effusion or calcified intra-articular body.  Degenerative changes are similar in appearance. Mild lateral  subluxation and tilt of the patella.    YASMIN LOCKWOOD MD         SYSTEM ID:  [id number]       Discharge Medications   Current Discharge Medication List        START taking these medications    Details   acetaminophen (TYLENOL) 325 MG tablet Take 3 tablets (975 mg) by mouth every 8 hours  Qty: 60 tablet, Refills: 0    Associated Diagnoses: Closed displaced fracture of right femoral neck (H)      aspirin (ASA) 325 MG EC tablet Take 1 tablet (325 mg) by mouth daily for 42 days  Qty: 42 tablet, Refills: 0    Associated Diagnoses: Closed displaced fracture of right femoral neck (H)      bisacodyl (DULCOLAX) 10 MG  suppository Place 1 suppository (10 mg) rectally daily as needed for constipation    Associated Diagnoses: Closed displaced fracture of right femoral neck (H)      oxyCODONE (ROXICODONE) 5 MG tablet Take 1 tablet (5 mg) by mouth every 4 hours as needed for severe pain  Qty: 25 tablet, Refills: 0    Associated Diagnoses: Closed displaced fracture of right femoral neck (H)      polyethylene glycol (MIRALAX) 17 GM/Dose powder Take 17 g by mouth daily  Qty: 510 g, Refills: 0    Associated Diagnoses: Closed displaced fracture of right femoral neck (H)      Vitamin D3 (CHOLECALCIFEROL) 25 mcg (1000 units) tablet Take 2 tablets (50 mcg) by mouth daily for 30 days  Qty: 60 tablet, Refills: 0    Associated Diagnoses: Closed displaced fracture of right femoral neck (H)           CONTINUE these medications which have CHANGED    Details   LORazepam (ATIVAN) 0.5 MG tablet Take 0.5 tablets (0.25 mg) by mouth daily as needed for anxiety  Qty: 10 tablet, Refills: 0    Associated Diagnoses: Anxiety      predniSONE (DELTASONE) 1 MG tablet Take 8 tablets (8 mg) by mouth daily    Comments: Bring down by 2mg every week  Associated Diagnoses: PMR (polymyalgia rheumatica) (H24)      senna-docusate (SENOKOT-S/PERICOLACE) 8.6-50 MG tablet Take 2 tablets by mouth 3 times daily  Qty: 90 tablet, Refills: 0    Associated Diagnoses: Closed displaced fracture of right femoral neck (H)      temazepam (RESTORIL) 15 MG capsule Take 1 capsule (15 mg) by mouth At Bedtime  Qty: 10 capsule, Refills: 0    Associated Diagnoses: Anxiety           CONTINUE these medications which have NOT CHANGED    Details   AMITRIPTYLINE HCL PO Take 25 mg by mouth At Bedtime      clindamycin (CLEOCIN T) 1 % external lotion Apply topically 2 times daily as needed (acne)      metoprolol succinate ER (TOPROL-XL) 25 MG 24 hr tablet Take 100 mg by mouth daily           STOP taking these medications       aspirin - buffered (BUFFERIN) 325 MG TABS tablet Comments:   Reason for  Stopping:         HYDROcodone-acetaminophen (NORCO) 5-325 MG per tablet Comments:   Reason for Stopping:         lisinopril (ZESTRIL) 10 MG tablet Comments:   Reason for Stopping:         naproxen (NAPROSYN) 500 MG tablet Comments:   Reason for Stopping:             Allergies   Allergies   Allergen Reactions    Fosamax [Alendronate]      Shut down system    Morphine      Mental status change    Risedronate Sodium [Risedronate]      Shut down system

## 2023-09-24 ENCOUNTER — LAB REQUISITION (OUTPATIENT)
Dept: LAB | Facility: CLINIC | Age: 78
End: 2023-09-24

## 2023-09-24 ENCOUNTER — APPOINTMENT (OUTPATIENT)
Dept: PHYSICAL THERAPY | Facility: CLINIC | Age: 78
DRG: 522 | End: 2023-09-24
Payer: MEDICARE

## 2023-09-24 VITALS
HEART RATE: 86 BPM | SYSTOLIC BLOOD PRESSURE: 127 MMHG | OXYGEN SATURATION: 95 % | RESPIRATION RATE: 18 BRPM | TEMPERATURE: 97.7 F | DIASTOLIC BLOOD PRESSURE: 86 MMHG

## 2023-09-24 VITALS
RESPIRATION RATE: 16 BRPM | BODY MASS INDEX: 24.59 KG/M2 | SYSTOLIC BLOOD PRESSURE: 120 MMHG | TEMPERATURE: 98.2 F | DIASTOLIC BLOOD PRESSURE: 74 MMHG | OXYGEN SATURATION: 91 % | HEART RATE: 83 BPM | WEIGHT: 144 LBS | HEIGHT: 64 IN

## 2023-09-24 DIAGNOSIS — Z00.01 ENCOUNTER FOR GENERAL ADULT MEDICAL EXAMINATION WITH ABNORMAL FINDINGS: ICD-10-CM

## 2023-09-24 LAB
GLUCOSE BLDC GLUCOMTR-MCNC: 120 MG/DL (ref 70–99)
GLUCOSE BLDC GLUCOMTR-MCNC: 150 MG/DL (ref 70–99)

## 2023-09-24 PROCEDURE — 250N000012 HC RX MED GY IP 250 OP 636 PS 637: Performed by: INTERNAL MEDICINE

## 2023-09-24 PROCEDURE — 250N000013 HC RX MED GY IP 250 OP 250 PS 637: Performed by: ORTHOPAEDIC SURGERY

## 2023-09-24 PROCEDURE — 250N000013 HC RX MED GY IP 250 OP 250 PS 637: Performed by: PHYSICIAN ASSISTANT

## 2023-09-24 PROCEDURE — 97116 GAIT TRAINING THERAPY: CPT | Mod: GP

## 2023-09-24 PROCEDURE — 250N000013 HC RX MED GY IP 250 OP 250 PS 637: Performed by: HOSPITALIST

## 2023-09-24 PROCEDURE — 99232 SBSQ HOSP IP/OBS MODERATE 35: CPT | Mod: GC | Performed by: INTERNAL MEDICINE

## 2023-09-24 PROCEDURE — 97530 THERAPEUTIC ACTIVITIES: CPT | Mod: GP

## 2023-09-24 RX ADMIN — SENNOSIDES AND DOCUSATE SODIUM 2 TABLET: 50; 8.6 TABLET ORAL at 08:45

## 2023-09-24 RX ADMIN — Medication 50 MCG: at 08:45

## 2023-09-24 RX ADMIN — ASPIRIN 325 MG: 325 TABLET, COATED ORAL at 08:46

## 2023-09-24 RX ADMIN — OXYCODONE HYDROCHLORIDE 5 MG: 5 TABLET ORAL at 03:47

## 2023-09-24 RX ADMIN — PREDNISONE 8 MG: 2.5 TABLET ORAL at 08:45

## 2023-09-24 RX ADMIN — ACETAMINOPHEN 650 MG: 325 TABLET, FILM COATED ORAL at 03:47

## 2023-09-24 ASSESSMENT — ACTIVITIES OF DAILY LIVING (ADL)
ADLS_ACUITY_SCORE: 44
ADLS_ACUITY_SCORE: 45

## 2023-09-24 NOTE — PROGRESS NOTES
Patient had tylenol for pain control, will discharge to [facility] at noon. Had a loose stool this shift.

## 2023-09-24 NOTE — PLAN OF CARE
Physical Therapy Discharge Summary    Reason for therapy discharge:    Discharged to transitional care facility.    Progress towards therapy goal(s). See goals on Care Plan in [facility] electronic health record for goal details.  Goals partially met.  Barriers to achieving goals:   discharge from facility.    Therapy recommendation(s):    Continued therapy is recommended.  Rationale/Recommendations:  To further increase independence with mobility.

## 2023-09-24 NOTE — PROGRESS NOTES
Care Management Discharge Note    Discharge Date: 09/24/2023       Discharge Disposition: Transitional Care    Discharge Services: None    Discharge DME: None    Discharge Transportation: family or friend will provide    Private pay costs discussed: Patient paying privately.    Does the patient's insurance plan have a 3 day qualifying hospital stay waiver?  No    PAS Confirmation Code: [id number]  Patient/family educated on Medicare website which has current facility and service quality ratings: yes    Education Provided on the Discharge Plan: Yes  Persons Notified of Discharge Plans: Patient, hospital care team  Patient/Family in Agreement with the Plan: yes    Handoff Referral Completed: Yes    Additional Information:  Writer received call from [facility] that they are able to receive patient today at noon.   Writer paged hospitalist who stated that she is medically stable and is able to discharge to TCU today.   Writer updated patient who is in agreement with plan and states her friend will bring her via [facility] by w/c.   Writer updated bedside nurse who is aware, updated [facility], and [facility].   Discharge orders faxed via DOD. Signed hard RX faxed.    ARTUR Danielson, LGSW    [facility]

## 2023-09-24 NOTE — PLAN OF CARE
Goal Outcome Evaluation:    Date/Time: 9/23/23  ([id number])    Diagnosis: R JULIANNE  POD#: 3  Mental Status: A&O x4  Activity/dangle: A 1-2 GB/Wk or sera steady  Diet: Reg  Pain: oxycodone  & tylenol  Marrero/Voiding: Purewick overnight  Tele/Restraints/Iso: NA  02/LDA: PIV SL  D/C Date: to [facility] today around noon.   Other Info: CMS intact. Dressing CDI.

## 2023-09-25 ENCOUNTER — TRANSITIONAL CARE UNIT VISIT (OUTPATIENT)
Dept: GERIATRICS | Facility: CLINIC | Age: 78
End: 2023-09-25
Payer: MEDICARE

## 2023-09-25 DIAGNOSIS — E78.2 MIXED HYPERLIPIDEMIA: ICD-10-CM

## 2023-09-25 DIAGNOSIS — M35.3 PMR (POLYMYALGIA RHEUMATICA) (H): ICD-10-CM

## 2023-09-25 DIAGNOSIS — I10 BENIGN ESSENTIAL HYPERTENSION: ICD-10-CM

## 2023-09-25 DIAGNOSIS — M80.00XD AGE-RELATED OSTEOPOROSIS WITH CURRENT PATHOLOGICAL FRACTURE WITH ROUTINE HEALING, SUBSEQUENT ENCOUNTER: ICD-10-CM

## 2023-09-25 DIAGNOSIS — M87.051 AVASCULAR NECROSIS OF RIGHT FEMUR (H): ICD-10-CM

## 2023-09-25 DIAGNOSIS — F41.9 ANXIETY: ICD-10-CM

## 2023-09-25 DIAGNOSIS — Z96.641 AFTERCARE FOLLOWING RIGHT HIP JOINT REPLACEMENT SURGERY: ICD-10-CM

## 2023-09-25 DIAGNOSIS — Z71.89 ADVANCED DIRECTIVES, COUNSELING/DISCUSSION: ICD-10-CM

## 2023-09-25 DIAGNOSIS — R53.81 PHYSICAL DECONDITIONING: ICD-10-CM

## 2023-09-25 DIAGNOSIS — F51.01 PRIMARY INSOMNIA: ICD-10-CM

## 2023-09-25 DIAGNOSIS — S72.001A CLOSED DISPLACED FRACTURE OF RIGHT FEMORAL NECK (H): Primary | ICD-10-CM

## 2023-09-25 DIAGNOSIS — Z47.1 AFTERCARE FOLLOWING RIGHT HIP JOINT REPLACEMENT SURGERY: ICD-10-CM

## 2023-09-25 DIAGNOSIS — Z85.9 HISTORY OF MALIGNANT NEOPLASM: ICD-10-CM

## 2023-09-25 DIAGNOSIS — K59.09 CHRONIC CONSTIPATION: ICD-10-CM

## 2023-09-25 DIAGNOSIS — D62 ABLA (ACUTE BLOOD LOSS ANEMIA): ICD-10-CM

## 2023-09-25 PROBLEM — M79.7 FIBROMYALGIA: Status: ACTIVE | Noted: 2019-01-25

## 2023-09-25 PROBLEM — M51.369 DEGENERATION OF LUMBAR INTERVERTEBRAL DISC: Status: ACTIVE | Noted: 2020-11-10

## 2023-09-25 PROBLEM — M75.41 SHOULDER IMPINGEMENT SYNDROME, RIGHT: Status: ACTIVE | Noted: 2018-07-25

## 2023-09-25 PROCEDURE — 36415 COLL VENOUS BLD VENIPUNCTURE: CPT | Performed by: NURSE PRACTITIONER

## 2023-09-25 PROCEDURE — 99309 SBSQ NF CARE MODERATE MDM 30: CPT | Performed by: NURSE PRACTITIONER

## 2023-09-25 PROCEDURE — P9604 ONE-WAY ALLOW PRORATED TRIP: HCPCS | Performed by: NURSE PRACTITIONER

## 2023-09-25 PROCEDURE — 86481 TB AG RESPONSE T-CELL SUSP: CPT | Performed by: NURSE PRACTITIONER

## 2023-09-25 RX ORDER — LISINOPRIL 10 MG/1
10 TABLET ORAL DAILY
COMMUNITY

## 2023-09-25 RX ORDER — TEMAZEPAM 15 MG/1
15 CAPSULE ORAL AT BEDTIME
Qty: 30 CAPSULE | Refills: 0 | Status: SHIPPED | OUTPATIENT
Start: 2023-09-25 | End: 2023-09-27

## 2023-09-25 NOTE — PROGRESS NOTES
[facility] GERIATRICS    PRIMARY CARE PROVIDER AND CLINIC:  JOANNE BRAGG [facility] MEDICAL [facility] [address] / [facility]  Chief Complaint   Patient presents with    Hospital F/U      [place] Medical Record Number:  [id number]  Place of Service where encounter took place:  [facility] [[id number]]    Sixto Jimenez  is a 78 year old  (1945), admitted to the above facility from  [facility]. Hospital stay 9/20/23 through 9/24/23..   HPI:    78 year old woman PMH colon cancer, chronic Prednisone use due to ?PMR, osteoporosis and chronic back pain hospitalized with with acute on chronic pain in the right hip, knee and back and is found to have an acute, displaced subcapital fracture of the right femoral neck now s/p s/p right total hip arthroplasty on 9/21/2023. 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 Anemia Hgb 10.3->9.4 post op. Prednisone to be tapered 1 mg every 2 weeks per rheumatology. Leukocytosis due to stress dose of solu-cortef. HTN managed with lisinopril and metoprolol. Pain on oxycodone, dilaudid. Anxiety on Elavil, ativan and temazepam. Constipation on laxatives. To TCU for rehab.     Seen for initial TCU visit. Asks to be Full Code. Denies pain at this time. Complaints of chronic constipation and requests scheduled and PRN laxatives. No headaches, dizziness, chest pain, dyspnea, bladder issues. BP range 127-150/81-90 and sats 97% room air. HR  but patient anxious at times. Reports lisinopril recently stopped, agreeable to resuming this. Asks to schedule Restoril at HS per home routine.     CODE STATUS/ADVANCE DIRECTIVES DISCUSSION:  Prior  CPR/Full code   ALLERGIES:   Allergies   Allergen Reactions    Fosamax [Alendronate]      Shut down system    Morphine      Mental status change    Risedronate Sodium [Risedronate]      Shut down system      PAST MEDICAL HISTORY:   Past Medical History:   Diagnosis Date    Allergic rhinitis, cause unspecified     Chronic  abdominal pain     Chronic bilateral low back pain     DDD    Chronic constipation     Colocutaneous fistula 2012    Fibromyalgia     HTN (hypertension)     Malignant neoplasm of transverse colon (H) 2010    Stage 3A 2/22 nodes positive: 10/6/2010 on colonoscopy: at [facility]. Noted mass: Invasive adenocarcinoma: CEA 3.7, size 2 x 12.7 cm.    Mitral valve prolapse     Mixed hyperlipidemia 01/28/2019    Osteoporosis     PVC's (premature ventricular contractions)     Urethral meatal stenosis 2010    Dilated twice by Dr. Eaton of Urology      PAST SURGICAL HISTORY:   has a past surgical history that includes cholecystectomy, laporoscopic; tonsillectomy; fractured fibula; fractured wrist; Colonoscopy (04/23/2014); Ileostomy; GYN surgery; Colonoscopy (N/A, 04/26/2017); Colon surgery (10/14/2010); Exploratory Laparotomy W/ Bowel Resection (10/16/2010); [facility] lap lysis of peritoneal adhesions (12/14/2011); Exploratory Laparotomy W/ Bowel Resection (06/28/2012); and Arthroplasty hip (Right, 9/21/2023).  FAMILY HISTORY: family history includes Angina in her mother; Cerebrovascular Disease in her sister; Heart Disease in her father.  SOCIAL HISTORY:   reports that she has quit smoking. She has never used smokeless tobacco. She reports that she does not currently use alcohol. She reports that she does not use drugs.  Patient's living condition: lives alone    Post Discharge Medication Reconciliation Status:   MED REC REQUIRED  Post Medication Reconciliation Status:  Discharge medications reconciled and changed, see notes/orders       Current Outpatient Medications   Medication Sig    acetaminophen (TYLENOL) 325 MG tablet Take 3 tablets (975 mg) by mouth every 8 hours    AMITRIPTYLINE HCL PO Take 25 mg by mouth At Bedtime    aspirin (ASA) 325 MG EC tablet Take 1 tablet (325 mg) by mouth daily for 42 days    bisacodyl (DULCOLAX) 10 MG suppository Place 1 suppository (10 mg) rectally daily as needed for constipation    clindamycin  (CLEOCIN T) 1 % external lotion Apply topically 2 times daily as needed (acne)    LORazepam (ATIVAN) 0.5 MG tablet Take 0.5 tablets (0.25 mg) by mouth daily as needed for anxiety    metoprolol succinate ER (TOPROL-XL) 25 MG 24 hr tablet Take 100 mg by mouth daily    oxyCODONE (ROXICODONE) 5 MG tablet Take 1 tablet (5 mg) by mouth every 4 hours as needed for severe pain    polyethylene glycol (MIRALAX) 17 GM/Dose powder Take 17 g by mouth daily    predniSONE (DELTASONE) 1 MG tablet Take 8 tablets (8 mg) by mouth daily    senna-docusate (SENOKOT-S/PERICOLACE) 8.6-50 MG tablet Take 2 tablets by mouth 3 times daily    temazepam (RESTORIL) 15 MG capsule Take 1 capsule (15 mg) by mouth At Bedtime And 1 capsule at HS PRN    Vitamin D3 (CHOLECALCIFEROL) 25 mcg (1000 units) tablet Take 2 tablets (50 mcg) by mouth daily for 30 days     No current facility-administered medications for this visit.     ROS:  10 point ROS of systems including Constitutional, Eyes, Respiratory, Cardiovascular, Gastroenterology, Genitourinary, Integumentary, Musculoskeletal, Psychiatric were all negative except for pertinent positives noted in my HPI.    Vitals:  /86   Pulse 86   Temp 97.7  F (36.5  C)   Resp 18   SpO2 95%   Exam:  GENERAL APPEARANCE:  Alert, anxious, pleasant, cooperative, oriented x 4  EYES:  EOM, lids, pupils and irises normal, sclera clear and conjunctiva normal, no discharge or mattering on lids or lashes noted  ENT:  Mouth normal, moist mucous membranes, nose normal without drainage or crusting, external ears without lesions, hearing acuity intact  NECK: supple, symmetrical, trachea midline  RESP:  respiratory effort normal, no chest wall tenderness, no respiratory distress, Lung sounds clear, patient is on RA  CV:  Auscultation of heart done, rate and rhythm controlled and regular, no murmur, no rub or gallop. Edema trace pedal.   ABDOMEN:  normal bowel sounds, soft, nontender, no palpable masses.  M/S:   Gait and  station unsafe without assistance, no tenderness or swelling of the joints; able to move all extremities, digits normal  NEURO: cranial nerves 2-12 grossly intact, no facial asymmetry, no speech deficits and able to follow directions, moves all extremities symmetrically  PSYCH:  insight and judgement and memory intact, affect and mood anxious    Lab/Diagnostic data:  Most Recent 3 CBC's:  Recent Labs   Lab Test 09/23/23  0750 09/22/23  0802 09/21/23  0729   WBC 12.7* 16.9* 9.4   HGB 9.4* 10.1* 10.3*   MCV 93 94 93    250 210     Most Recent 3 BMP's:  Recent Labs   Lab Test 09/24/23  0800 09/24/23  0236 09/23/23  2150 09/22/23  0853 09/22/23  0802 09/21/23  1219 09/21/23  0729 09/21/23  0248 09/21/23  0041   NA  --   --   --   --  133*  --  134*  --  132*   POTASSIUM  --   --   --   --  4.3  --  4.2  --  4.3   CHLORIDE  --   --   --   --  98  --  99  --  97*   CO2  --   --   --   --  24  --  24  --  24   BUN  --   --   --   --  21.3  --  12.1  --  13.8   CR  --   --   --   --  0.87  --  0.73  --  0.70   ANIONGAP  --   --   --   --  11  --  11  --  11   DU  --   --   --   --  8.7*  --  8.8  --  8.8   * 150* 113*   < > 126*   < > 100*   < > 101*    < > = values in this interval not displayed.       ASSESSMENT/PLAN:  Closed displaced fracture of right femoral neck (H)  AVN  Aftercare following right hip joint replacement surgery  Age-related osteoporosis with current pathological fracture with routine healing, subsequent encounter  Physical deconditioning  Acute on chronic. Continue tylenol 975 mg TID, asa 325 mg daily for DVT prophylaxis, oxycodone 5 mg every 4 hrs PRN. Therapies as ordered and follow-up progress. Ortho follow-up as recommended.     ABLA (acute blood loss anemia)  Acute, note Hgb 9.4 last check. Check CBC on 9/27.     PMR (polymyalgia rheumatica) (H)  Suspected. Continue prednisone taper as ordered (starting at 8 mg, taper by 2 mg weekly), f/u rheumatologist as planned.     Benign  essential hypertension  Chronic, continue metoprolol  mg daily, resume lisinopril 10 mg daily. Monitor vs and review ranges next visit. BMP check on 9/27.     Chronic constipation  History of malignant neoplasm  Chronic. Continue bisacodyl 10 mg supp daily PRN, Miralax 17 gm daily, senna s change to 2 tabs with meals (TID), Staff to update provider if not effective. Add PRN fleet enema daily.     Mixed hyperlipidemia  By history, not currently on meds.    Anxiety  Primary insomnia  Chronic. Continue Amitriptyline 25 mg HS, lorazepam 0.25 mg daily PRN, change temazepam (RESTORIL) 15 MG capsule; Take 1 capsule (15 mg) by mouth At Bedtime And 1 capsule at HS PRN. Monitor mood and consider ACP referral.     Advanced directives, counseling/discussion  Asks to be Full Code      Orders:  Full Code  Change senna s to 2 tabs PO with meals TID  Add PRN mom 30 ml daily and daily fleet enema PRN constipation  Lisinopril 10 mg daily diagnosis HTN  CBC and BMP on 9/27  Tubi  to legs due to edema  Change Restoril to 15 mg PO HS and HS PRN insomnia    Electronically signed by:  EMANUEL Santoro CNP

## 2023-09-26 ENCOUNTER — LAB REQUISITION (OUTPATIENT)
Dept: LAB | Facility: CLINIC | Age: 78
End: 2023-09-26

## 2023-09-26 DIAGNOSIS — E87.1 HYPO-OSMOLALITY AND HYPONATREMIA: ICD-10-CM

## 2023-09-26 DIAGNOSIS — D64.9 ANEMIA, UNSPECIFIED: ICD-10-CM

## 2023-09-26 LAB
GAMMA INTERFERON BACKGROUND BLD IA-ACNC: 0.01 IU/ML
M TB IFN-G BLD-IMP: ABNORMAL
M TB IFN-G CD4+ BCKGRND COR BLD-ACNC: 0.22 IU/ML
MITOGEN IGNF BCKGRD COR BLD-ACNC: 0.01 IU/ML
MITOGEN IGNF BCKGRD COR BLD-ACNC: 0.01 IU/ML
PATH REPORT.COMMENTS IMP SPEC: NORMAL
PATH REPORT.COMMENTS IMP SPEC: NORMAL
PATH REPORT.FINAL DX SPEC: NORMAL
PATH REPORT.GROSS SPEC: NORMAL
PATH REPORT.MICROSCOPIC SPEC OTHER STN: NORMAL
PATH REPORT.RELEVANT HX SPEC: NORMAL
PHOTO IMAGE: NORMAL
QUANTIFERON MITOGEN: 0.23 IU/ML
QUANTIFERON NIL TUBE: 0.01 IU/ML
QUANTIFERON TB1 TUBE: 0.02 IU/ML
QUANTIFERON TB2 TUBE: 0.02

## 2023-09-26 PROCEDURE — 88305 TISSUE EXAM BY PATHOLOGIST: CPT | Mod: 26 | Performed by: PATHOLOGY

## 2023-09-26 PROCEDURE — 88311 DECALCIFY TISSUE: CPT | Mod: 26 | Performed by: PATHOLOGY

## 2023-09-27 DIAGNOSIS — F51.01 PRIMARY INSOMNIA: ICD-10-CM

## 2023-09-27 DIAGNOSIS — F41.9 ANXIETY: ICD-10-CM

## 2023-09-27 LAB
ANION GAP SERPL CALCULATED.3IONS-SCNC: 14 MMOL/L (ref 7–15)
BUN SERPL-MCNC: 10.7 MG/DL (ref 8–23)
CALCIUM SERPL-MCNC: 9.1 MG/DL (ref 8.8–10.2)
CHLORIDE SERPL-SCNC: 97 MMOL/L (ref 98–107)
CREAT SERPL-MCNC: 0.69 MG/DL (ref 0.51–0.95)
DEPRECATED HCO3 PLAS-SCNC: 21 MMOL/L (ref 22–29)
EGFRCR SERPLBLD CKD-EPI 2021: 89 ML/MIN/1.73M2
ERYTHROCYTE [DISTWIDTH] IN BLOOD BY AUTOMATED COUNT: 15.8 % (ref 10–15)
GLUCOSE SERPL-MCNC: 99 MG/DL (ref 70–99)
HCT VFR BLD AUTO: 30.3 % (ref 35–47)
HGB BLD-MCNC: 9.8 G/DL (ref 11.7–15.7)
MCH RBC QN AUTO: 30.5 PG (ref 26.5–33)
MCHC RBC AUTO-ENTMCNC: 32.3 G/DL (ref 31.5–36.5)
MCV RBC AUTO: 94 FL (ref 78–100)
PLATELET # BLD AUTO: 307 10E3/UL (ref 150–450)
POTASSIUM SERPL-SCNC: 4.1 MMOL/L (ref 3.4–5.3)
RBC # BLD AUTO: 3.21 10E6/UL (ref 3.8–5.2)
SODIUM SERPL-SCNC: 132 MMOL/L (ref 135–145)
WBC # BLD AUTO: 10.2 10E3/UL (ref 4–11)

## 2023-09-27 PROCEDURE — P9604 ONE-WAY ALLOW PRORATED TRIP: HCPCS | Performed by: NURSE PRACTITIONER

## 2023-09-27 PROCEDURE — 36415 COLL VENOUS BLD VENIPUNCTURE: CPT | Performed by: NURSE PRACTITIONER

## 2023-09-27 PROCEDURE — 85027 COMPLETE CBC AUTOMATED: CPT | Performed by: NURSE PRACTITIONER

## 2023-09-27 PROCEDURE — 80048 BASIC METABOLIC PNL TOTAL CA: CPT | Performed by: NURSE PRACTITIONER

## 2023-09-27 RX ORDER — LORAZEPAM 0.5 MG/1
0.25 TABLET ORAL 2 TIMES DAILY PRN
Qty: 15 TABLET | Refills: 0 | Status: SHIPPED | OUTPATIENT
Start: 2023-09-27 | End: 2023-09-28

## 2023-09-27 RX ORDER — TEMAZEPAM 15 MG/1
15 CAPSULE ORAL AT BEDTIME
Qty: 15 CAPSULE | Refills: 0 | Status: SHIPPED | OUTPATIENT
Start: 2023-09-27

## 2023-09-28 ENCOUNTER — DISCHARGE SUMMARY NURSING HOME (OUTPATIENT)
Dept: GERIATRICS | Facility: CLINIC | Age: 78
End: 2023-09-28
Payer: MEDICARE

## 2023-09-28 VITALS
TEMPERATURE: 97.7 F | HEIGHT: 64 IN | BODY MASS INDEX: 24.72 KG/M2 | RESPIRATION RATE: 18 BRPM | DIASTOLIC BLOOD PRESSURE: 81 MMHG | OXYGEN SATURATION: 99 % | HEART RATE: 91 BPM | SYSTOLIC BLOOD PRESSURE: 155 MMHG

## 2023-09-28 DIAGNOSIS — K59.09 CHRONIC CONSTIPATION: ICD-10-CM

## 2023-09-28 DIAGNOSIS — F41.9 ANXIETY: ICD-10-CM

## 2023-09-28 DIAGNOSIS — M80.00XD AGE-RELATED OSTEOPOROSIS WITH CURRENT PATHOLOGICAL FRACTURE WITH ROUTINE HEALING, SUBSEQUENT ENCOUNTER: ICD-10-CM

## 2023-09-28 DIAGNOSIS — E78.2 MIXED HYPERLIPIDEMIA: ICD-10-CM

## 2023-09-28 DIAGNOSIS — S72.001A CLOSED DISPLACED FRACTURE OF RIGHT FEMORAL NECK (H): ICD-10-CM

## 2023-09-28 DIAGNOSIS — Z85.9 HISTORY OF MALIGNANT NEOPLASM: ICD-10-CM

## 2023-09-28 DIAGNOSIS — M35.3 PMR (POLYMYALGIA RHEUMATICA) (H): ICD-10-CM

## 2023-09-28 DIAGNOSIS — D62 ABLA (ACUTE BLOOD LOSS ANEMIA): ICD-10-CM

## 2023-09-28 DIAGNOSIS — S72.001A CLOSED DISPLACED FRACTURE OF RIGHT FEMORAL NECK (H): Primary | ICD-10-CM

## 2023-09-28 DIAGNOSIS — I10 BENIGN ESSENTIAL HYPERTENSION: ICD-10-CM

## 2023-09-28 DIAGNOSIS — Z96.641 AFTERCARE FOLLOWING RIGHT HIP JOINT REPLACEMENT SURGERY: ICD-10-CM

## 2023-09-28 DIAGNOSIS — Z47.1 AFTERCARE FOLLOWING RIGHT HIP JOINT REPLACEMENT SURGERY: ICD-10-CM

## 2023-09-28 DIAGNOSIS — M87.051 AVASCULAR NECROSIS OF RIGHT FEMUR (H): ICD-10-CM

## 2023-09-28 DIAGNOSIS — R53.81 PHYSICAL DECONDITIONING: ICD-10-CM

## 2023-09-28 DIAGNOSIS — F51.01 PRIMARY INSOMNIA: ICD-10-CM

## 2023-09-28 PROCEDURE — 99316 NF DSCHRG MGMT 30 MIN+: CPT | Performed by: NURSE PRACTITIONER

## 2023-09-28 RX ORDER — LORAZEPAM 0.5 MG/1
0.25 TABLET ORAL 2 TIMES DAILY PRN
Qty: 10 TABLET | Refills: 0 | Status: SHIPPED | OUTPATIENT
Start: 2023-09-28

## 2023-09-28 RX ORDER — HYDROCODONE BITARTRATE AND ACETAMINOPHEN 5; 325 MG/1; MG/1
1 TABLET ORAL EVERY 6 HOURS PRN
Qty: 10 TABLET | Refills: 0 | Status: SHIPPED | OUTPATIENT
Start: 2023-09-28

## 2023-09-28 RX ORDER — HYDROCODONE BITARTRATE AND ACETAMINOPHEN 5; 325 MG/1; MG/1
1 TABLET ORAL EVERY 6 HOURS PRN
Qty: 10 TABLET | Refills: 0 | Status: SHIPPED | OUTPATIENT
Start: 2023-09-28 | End: 2023-09-28

## 2023-09-28 NOTE — PROGRESS NOTES
[facility] GERIATRICS DISCHARGE SUMMARY  PATIENT'S NAME: Sixto Jimenez  YOB: 1945  MEDICAL RECORD NUMBER:  [id number]  [facility] of Service where encounter took place[facility] [[id number]]    PRIMARY CARE PROVIDER AND CLINIC RESPONSIBLE AFTER TRANSFER:   [facility] [address] [facility] / [facility]    Non-FMG Provider     Transferring providers: EMANUEL Santoro CNP, Dr. Farhad MD  Recent Hospitalization/ED:  [facility] Hospital stay 9/20/23 to 9/24/23.  Date of SNF Admission:  9/24/23  Date of SNF (anticipated) Discharge:  9/29/23  Discharged to: [facility]  Cognitive Scores:  not available   Physical Function: Ambulating 40 ft with FWW  DME: No new DME needed    CODE STATUS/ADVANCE DIRECTIVES DISCUSSION:  Prior   ALLERGIES: Fosamax [alendronate], Morphine, and Risedronate sodium [risedronate]    NURSING FACILITY COURSE   Medication Changes/Rationale:   Stopped oxycodone.   Started Norco 5/325 mg tabs 1 QID PRN pain  Changed ativan to 0.25 mg BID PRN anxiety  Resumed lisinopril 10 mg daily diagnosis HTN    Per recent TCU provider progress notes:   78 year old woman PMH colon cancer, chronic Prednisone use due to ?PMR, osteoporosis and chronic back pain hospitalized with with acute on chronic pain in the right hip, knee and back and is found to have an acute, displaced subcapital fracture of the right femoral neck now s/p s/p right total hip arthroplasty on 9/21/2023. 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 Anemia Hgb 10.3->9.4 post op. Prednisone to be tapered 1 mg every 2 weeks per rheumatology. Leukocytosis due to stress dose of solu-cortef. HTN managed with lisinopril and metoprolol. Pain on oxycodone, dilaudid. Anxiety on Elavil, ativan and temazepam. Constipation on laxatives. To TCU for rehab.     Seen for discharge visit. Asks to switch from oxycodone to Norco for better pain control. No other new concerns, bowels are moving well.  BP range 145-160/81-90 and sats 99% room air. Sats 99% room air. Home with home care as ordered below.     Summary of nursing facility stay:   Closed displaced fracture of right femoral neck (H)  AVN  Aftercare following right hip joint replacement surgery  Age-related osteoporosis with current pathological fracture with routine healing, subsequent encounter  Physical deconditioning  Acute on chronic. Continue tylenol 975 mg TID, asa 325 mg daily for DVT prophylaxis, stop oxycodone and instead start PRN norco 5/325 mg tabs. Home with home care. Ortho follow-up as recommended.     ABLA (acute blood loss anemia)  Acute, note Hgb 9.4 last check. Check CBC on 9/27 and Hgb 9.8    PMR (polymyalgia rheumatica) (H)  Suspected. Continue prednisone taper as ordered (starting at 8 mg, taper by 2 mg weekly), f/u rheumatologist as planned.     Benign essential hypertension  Chronic, continue metoprolol  mg daily, resume lisinopril 10 mg daily. Monitor vs and review ranges next PCP visit. BMP check on 9/27 with Cr 0.69    Chronic constipation  History of malignant neoplasm  Chronic. Continue bisacodyl 10 mg supp daily PRN, Miralax 17 gm daily, senna s change to 2 tabs with meals (TID). Effective.     Mixed hyperlipidemia  By history, not currently on meds.    Anxiety  Primary insomnia  Chronic. Continue Amitriptyline 25 mg HS, lorazepam 0.25 mg twice daily PRN, change temazepam (RESTORIL) 15 MG capsule; Take 1 capsule (15 mg) by mouth At Bedtime and 1 capsule at HS PRN. Monitor mood.     Discharge Medications:  MED REC REQUIRED  Post Medication Reconciliation Status:  Discharge medications reconciled and changed, see notes/orders    Current Outpatient Medications   Medication Sig Dispense Refill    acetaminophen (TYLENOL) 325 MG tablet Take 3 tablets (975 mg) by mouth every 8 hours 60 tablet 0    aspirin (ASA) 325 MG EC tablet Take 1 tablet (325 mg) by mouth daily for 42 days 42 tablet 0    bisacodyl (DULCOLAX) 10 MG  suppository Place 1 suppository (10 mg) rectally daily as needed for constipation      lisinopril (ZESTRIL) 10 MG tablet Take 10 mg by mouth daily      magnesium hydroxide (MILK OF MAGNESIA) 400 MG/5ML suspension Take 30 mLs by mouth daily as needed for constipation or heartburn      metoprolol succinate ER (TOPROL-XL) 25 MG 24 hr tablet Take 100 mg by mouth daily      polyethylene glycol (MIRALAX) 17 GM/Dose powder Take 17 g by mouth daily 510 g 0    predniSONE (DELTASONE) 1 MG tablet Take 8 tablets (8 mg) by mouth daily      senna-docusate (SENOKOT-S/PERICOLACE) 8.6-50 MG tablet Take 2 tablets by mouth 3 times daily (Patient taking differently: Take 2 tablets by mouth 3 times daily (before meals)) 90 tablet 0    sodium phosphate (FLEET ENEMA) 7-19 GM/118ML rectal enema Place 1 enema rectally daily as needed for constipation      temazepam (RESTORIL) 15 MG capsule Take 1 capsule (15 mg) by mouth At Bedtime 15 capsule 0    Vitamin D3 (CHOLECALCIFEROL) 25 mcg (1000 units) tablet Take 2 tablets (50 mcg) by mouth daily for 30 days 60 tablet 0    amitriptyline (ELAVIL) 25 MG tablet Take 1 tablet (25 mg) by mouth At Bedtime 30 tablet 0    clindamycin (CLEOCIN T) 1 % external lotion Apply topically 2 times daily as needed (acne) (Patient not taking: Reported on 9/28/2023)      HYDROcodone-acetaminophen (NORCO) 5-325 MG tablet Take 1 tablet by mouth every 6 hours as needed for severe pain 10 tablet 0    LORazepam (ATIVAN) 0.5 MG tablet Take 0.5 tablets (0.25 mg) by mouth 2 times daily as needed for anxiety 10 tablet 0        Controlled medications:   Norco 5/325 mg tab #10 no refills  Ativan 0.5 mg tabs #10 no refills     Past Medical History:   Past Medical History:   Diagnosis Date    Allergic rhinitis, cause unspecified     Chronic abdominal pain     Chronic bilateral low back pain     DDD    Chronic constipation     Colocutaneous fistula 2012    Fibromyalgia     HTN (hypertension)     Malignant neoplasm of transverse  "colon (H) 2010    Stage 3A 2/22 nodes positive: 10/6/2010 on colonoscopy: at [facility] [place]. Noted mass: Invasive adenocarcinoma: CEA 3.7, size 2 x 12.7 cm.    Mitral valve prolapse     Mixed hyperlipidemia 01/28/2019    Osteoporosis     PVC's (premature ventricular contractions)     Urethral meatal stenosis 2010    Dilated twice by Dr. Eaton of Urology     Physical Exam:   Vitals: BP (!) 155/81   Pulse 91   Temp 97.7  F (36.5  C)   Resp 18   Ht 1.626 m (5' 4\")   SpO2 99%   BMI 24.72 kg/m    BMI: Body mass index is 24.72 kg/m .  GENERAL APPEARANCE:  Alert, in no distress, cooperative  ENT:  Mouth normal, moist mucous membranes, normal hearing acuity  EYES:  Conjunctiva and lids normal  RESP:  no respiratory distress, on room air  CV: no LE edema  NEURO:   No facial asymmetry, speech clear  PSYCH:  oriented X 3, affect and mood anxious    SNF labs: Most Recent 3 CBC's:  Recent Labs   Lab Test 09/27/23  0654 09/23/23  0750 09/22/23  0802   WBC 10.2 12.7* 16.9*   HGB 9.8* 9.4* 10.1*   MCV 94 93 94    199 250     Most Recent 3 BMP's:  Recent Labs   Lab Test 09/27/23  0654 09/24/23  0800 09/24/23  0236 09/22/23  0853 09/22/23  0802 09/21/23  1219 09/21/23  0729   *  --   --   --  133*  --  134*   POTASSIUM 4.1  --   --   --  4.3  --  4.2   CHLORIDE 97*  --   --   --  98  --  99   CO2 21*  --   --   --  24  --  24   BUN 10.7  --   --   --  21.3  --  12.1   CR 0.69  --   --   --  0.87  --  0.73   ANIONGAP 14  --   --   --  11  --  11   DU 9.1  --   --   --  8.7*  --  8.8   GLC 99 120* 150*   < > 126*   < > 100*    < > = values in this interval not displayed.       DISCHARGE PLAN:  Follow up labs: No labs orders/due  Medical Follow Up:      Follow up with primary care provider in 2-3 weeks  Follow up with specialist ortho as recommended   Current [place] scheduled appointments:   No future appointments.   Discharge Services: Home Care:  Occupational Therapy, Physical Therapy, Registered Nurse, Home Health Aide, " and From:  [facility]  Discharge Instructions Verbalized to Patient at Discharge:   DO NOT DRIVE while taking narcotic pain medications.     TOTAL DISCHARGE TIME:   Greater than 30 minutes  Electronically signed by:  EMANUEL Santoro CNP     Home care Face to Face documentation done in [facility] attached to Home care orders for [facility].

## 2024-01-02 NOTE — ANESTHESIA POSTPROCEDURE EVALUATION
Patient: Sixto Jimenez    Procedure: Procedure(s):  Right total hip arthroplasty       Anesthesia Type:  General    Note:     Postop Pain Control: Uneventful            Sign Out: Well controlled pain   PONV: No   Neuro/Psych: Uneventful            Sign Out: Acceptable/Baseline neuro status   Airway/Respiratory: Uneventful            Sign Out: Acceptable/Baseline resp. status   CV/Hemodynamics: Uneventful            Sign Out: Acceptable CV status; No obvious hypovolemia; No obvious fluid overload   Other NRE: NONE   DID A NON-ROUTINE EVENT OCCUR? No           Last vitals:  Vitals Value Taken Time   /76 09/21/23 1930   Temp 36.1  C (97  F) 09/21/23 1900   Pulse 83 09/21/23 1930   Resp 11 09/21/23 1930   SpO2 95 % 09/21/23 1930       Electronically Signed By: Ike Leong MD  January 2, 2024  6:59 AM

## 2024-05-10 NOTE — TELEPHONE ENCOUNTER
Diagnosis, Referred by & from: Self   Appt date:    NOTES STATUS DETAILS   OFFICE NOTE from referring provider N/A    OFFICE NOTE from other specialist Care Everywhere [facility]:   7/26/2019, 6/19/2019 OV with MICHAEL Lenz   DISCHARGE SUMMARY from hospital N/A    DISCHARGE REPORT from the ER N/A    OPERATIVE REPORT N/A    MEDICATION LIST Internal    LABS     ANAL PAP/CEA N/A    BIOPSIES/PATHOLOGY RELATED TO DIAGNOSIS N/A    DIAGNOSTIC PROCEDURES     PFC TESTING (from the Pelvic floor center includes Manometry, PDNL, EMG, etc.) N/A    COLONOSCOPY (most recent all time after 5 years) Internal 4/26/2017    FLEX SIGMOIDOSCOPY N/A    UPPER ENDOSCOPY (EGD) N/A    ERCP N/A    IMAGING (DISC & REPORT)      CT Received  [facility]:   12/28/2022 CT ABD PEL    4/15/2022 CT ABD PEL    6/21/2018 CT ABD PEL   MRI N/A    XRAY Received  [facility]:   1/19/2018 XR ABD    ULTRASOUND  (ENDOANAL/ENDORECTAL) N/A      Records Requested  05/10/24    Facility  [facility]  Fax: [phone]   Outcome Request sent to [facility] for imaging    - Images have been resolved in PACS

## 2024-07-24 ENCOUNTER — TRANSFERRED RECORDS (OUTPATIENT)
Dept: HEALTH INFORMATION MANAGEMENT | Facility: CLINIC | Age: 79
End: 2024-07-24
Payer: COMMERCIAL

## 2024-07-25 ENCOUNTER — PRE VISIT (OUTPATIENT)
Dept: SURGERY | Facility: CLINIC | Age: 79
End: 2024-07-25

## 2025-01-02 ENCOUNTER — MEDICAL CORRESPONDENCE (OUTPATIENT)
Dept: HEALTH INFORMATION MANAGEMENT | Facility: CLINIC | Age: 80
End: 2025-01-02

## 2025-03-05 ENCOUNTER — TRANSFERRED RECORDS (OUTPATIENT)
Dept: MULTI SPECIALTY CLINIC | Facility: CLINIC | Age: 80
End: 2025-03-05
Payer: MEDICARE

## 2025-03-05 LAB
CREATININE (EXTERNAL): 0.73 MG/DL (ref 0.6–1)
GFR ESTIMATED (EXTERNAL): 84 ML/MIN/1.73M2
GLUCOSE (EXTERNAL): 88 MG/DL (ref 65–99)
POTASSIUM (EXTERNAL): 4.9 MMOL/L (ref 3.5–5.3)

## 2025-03-18 NOTE — PROGRESS NOTES
PTA medications updated by Medication Scribe prior to surgery via phone call with patient (last doses completed by Nurse)     Medication history sources: Patient, Surescripts, and H&P  In the past week, patient estimated taking medication this percent of the time: Greater than 90%      Significant changes made to the medication list:  Patient reports no longer taking the following meds (med scribe removed from PTA med list): Dulcolax supp., CleocinT, Milk of Magnesia, Miralax, Prednisone, Fleet Enemas       Additional medication history information:   None    Medication reconciliation completed by provider prior to medication history? No    Time spent in this activity: 40 minutes    The information provided in this note is only as accurate as the sources available at the time of update(s)      Prior to Admission medications    Medication Sig Last Dose Taking? Auth Provider Long Term End Date   acetaminophen (TYLENOL) 325 MG tablet Take 3 tablets (975 mg) by mouth every 8 hours Unknown Yes Nicci Terry PA-C     amitriptyline (ELAVIL) 25 MG tablet Take 1 tablet (25 mg) by mouth At Bedtime Bedtime Yes Vivian Westfall APRN CNP Yes    HYDROcodone-acetaminophen (NORCO) 5-325 MG tablet Take 1 tablet by mouth every 6 hours as needed for severe pain Evening Yes Vivian Westfall APRN CNP No    LORazepam (ATIVAN) 0.5 MG tablet Take 0.5 tablets (0.25 mg) by mouth 2 times daily as needed for anxiety Unknown Yes Vivian Westfall APRN CNP     metoprolol succinate ER (TOPROL-XL) 25 MG 24 hr tablet Take 100 mg by mouth daily. (4 x 25 mg = 100 mg) Evening Yes Reported, Patient Yes    lisinopril (ZESTRIL) 10 MG tablet Take 10 mg by mouth daily 3/11/2025  Reported, Patient No    senna-docusate (SENOKOT-S/PERICOLACE) 8.6-50 MG tablet Take 2 tablets by mouth 3 times daily  Patient taking differently: Take 2 tablets by mouth 3 times daily (before meals). Unknown Yes Jade Navas MD No    temazepam (RESTORIL) 15 MG  capsule Take 1 capsule (15 mg) by mouth At Bedtime Bedtime Yes Vivian Westfall APRN CNP         Medication history completed by: Jaz Bradshaw LPN

## 2025-03-24 ENCOUNTER — HOSPITAL ENCOUNTER (INPATIENT)
Facility: CLINIC | Age: 80
LOS: 1 days | Discharge: HOME | DRG: 470 | End: 2025-03-26
Attending: ORTHOPAEDIC SURGERY | Admitting: ORTHOPAEDIC SURGERY
Payer: MEDICARE

## 2025-03-24 ENCOUNTER — ANESTHESIA (OUTPATIENT)
Dept: SURGERY | Facility: CLINIC | Age: 80
DRG: 470 | End: 2025-03-24
Payer: MEDICARE

## 2025-03-24 ENCOUNTER — ANESTHESIA EVENT (OUTPATIENT)
Dept: SURGERY | Facility: CLINIC | Age: 80
DRG: 470 | End: 2025-03-24
Payer: MEDICARE

## 2025-03-24 ENCOUNTER — APPOINTMENT (OUTPATIENT)
Dept: PHYSICAL THERAPY | Facility: CLINIC | Age: 80
DRG: 470 | End: 2025-03-24
Attending: ORTHOPAEDIC SURGERY
Payer: MEDICARE

## 2025-03-24 DIAGNOSIS — Z96.651 S/P TOTAL KNEE ARTHROPLASTY, RIGHT: Primary | ICD-10-CM

## 2025-03-24 LAB
FASTING STATUS PATIENT QL REPORTED: YES
GLUCOSE SERPL-MCNC: 108 MG/DL (ref 70–99)
POTASSIUM SERPL-SCNC: 4.4 MMOL/L (ref 3.4–5.3)

## 2025-03-24 PROCEDURE — 0SRC0J9 REPLACEMENT OF RIGHT KNEE JOINT WITH SYNTHETIC SUBSTITUTE, CEMENTED, OPEN APPROACH: ICD-10-PCS | Performed by: ORTHOPAEDIC SURGERY

## 2025-03-24 PROCEDURE — 250N000009 HC RX 250: Performed by: ORTHOPAEDIC SURGERY

## 2025-03-24 PROCEDURE — 36415 COLL VENOUS BLD VENIPUNCTURE: CPT | Performed by: ANESTHESIOLOGY

## 2025-03-24 PROCEDURE — 272N000001 HC OR GENERAL SUPPLY STERILE: Performed by: ORTHOPAEDIC SURGERY

## 2025-03-24 PROCEDURE — 999N000141 HC STATISTIC PRE-PROCEDURE NURSING ASSESSMENT: Performed by: ORTHOPAEDIC SURGERY

## 2025-03-24 PROCEDURE — 250N000011 HC RX IP 250 OP 636: Performed by: PHYSICIAN ASSISTANT

## 2025-03-24 PROCEDURE — 97116 GAIT TRAINING THERAPY: CPT | Mod: GP

## 2025-03-24 PROCEDURE — 258N000003 HC RX IP 258 OP 636: Performed by: PHYSICIAN ASSISTANT

## 2025-03-24 PROCEDURE — 250N000009 HC RX 250

## 2025-03-24 PROCEDURE — 370N000017 HC ANESTHESIA TECHNICAL FEE, PER MIN: Performed by: ORTHOPAEDIC SURGERY

## 2025-03-24 PROCEDURE — 360N000077 HC SURGERY LEVEL 4, PER MIN: Performed by: ORTHOPAEDIC SURGERY

## 2025-03-24 PROCEDURE — 710N000009 HC RECOVERY PHASE 1, LEVEL 1, PER MIN: Performed by: ORTHOPAEDIC SURGERY

## 2025-03-24 PROCEDURE — 250N000011 HC RX IP 250 OP 636

## 2025-03-24 PROCEDURE — 250N000013 HC RX MED GY IP 250 OP 250 PS 637: Performed by: PHYSICIAN ASSISTANT

## 2025-03-24 PROCEDURE — 258N000001 HC RX 258: Performed by: ORTHOPAEDIC SURGERY

## 2025-03-24 PROCEDURE — 258N000003 HC RX IP 258 OP 636: Performed by: ANESTHESIOLOGY

## 2025-03-24 PROCEDURE — 84132 ASSAY OF SERUM POTASSIUM: CPT | Performed by: ANESTHESIOLOGY

## 2025-03-24 PROCEDURE — 97161 PT EVAL LOW COMPLEX 20 MIN: CPT | Mod: GP

## 2025-03-24 PROCEDURE — 82947 ASSAY GLUCOSE BLOOD QUANT: CPT | Performed by: ANESTHESIOLOGY

## 2025-03-24 PROCEDURE — 97530 THERAPEUTIC ACTIVITIES: CPT | Mod: GP

## 2025-03-24 PROCEDURE — C1713 ANCHOR/SCREW BN/BN,TIS/BN: HCPCS | Performed by: ORTHOPAEDIC SURGERY

## 2025-03-24 PROCEDURE — C1776 JOINT DEVICE (IMPLANTABLE): HCPCS | Performed by: ORTHOPAEDIC SURGERY

## 2025-03-24 PROCEDURE — 250N000009 HC RX 250: Performed by: ANESTHESIOLOGY

## 2025-03-24 PROCEDURE — 250N000011 HC RX IP 250 OP 636: Performed by: ANESTHESIOLOGY

## 2025-03-24 DEVICE — ATTUNE KNEE SYSTEM TIBIAL BASE FIXED BEARING SIZE 5 CEMENTED
Type: IMPLANTABLE DEVICE | Site: KNEE | Status: FUNCTIONAL
Brand: ATTUNE

## 2025-03-24 DEVICE — ATTUNE KNEE SYSTEM TIBIAL INSERT FIXED BEARING POSTERIOR STABILIZED 5 8MM AOX
Type: IMPLANTABLE DEVICE | Site: KNEE | Status: FUNCTIONAL
Brand: ATTUNE

## 2025-03-24 DEVICE — IMP BONE CEMENT STRK SIMPLEX SPEEDSET 6192-1-001: Type: IMPLANTABLE DEVICE | Site: KNEE | Status: FUNCTIONAL

## 2025-03-24 DEVICE — ATTUNE KNEE SYSTEM FEMORAL POSTERIOR STABILIZED SIZE 5 RIGHT CEMENTED
Type: IMPLANTABLE DEVICE | Site: KNEE | Status: FUNCTIONAL
Brand: ATTUNE

## 2025-03-24 DEVICE — ATTUNE PATELLA MEDIALIZED DOME 38MM CEMENTED AOX
Type: IMPLANTABLE DEVICE | Site: KNEE | Status: FUNCTIONAL
Brand: ATTUNE

## 2025-03-24 RX ORDER — SODIUM CHLORIDE, SODIUM LACTATE, POTASSIUM CHLORIDE, CALCIUM CHLORIDE 600; 310; 30; 20 MG/100ML; MG/100ML; MG/100ML; MG/100ML
INJECTION, SOLUTION INTRAVENOUS CONTINUOUS
Status: DISCONTINUED | OUTPATIENT
Start: 2025-03-24 | End: 2025-03-26 | Stop reason: HOSPADM

## 2025-03-24 RX ORDER — ONDANSETRON 2 MG/ML
4 INJECTION INTRAMUSCULAR; INTRAVENOUS EVERY 6 HOURS PRN
Status: DISCONTINUED | OUTPATIENT
Start: 2025-03-24 | End: 2025-03-26 | Stop reason: HOSPADM

## 2025-03-24 RX ORDER — DEXAMETHASONE SODIUM PHOSPHATE 4 MG/ML
4 INJECTION, SOLUTION INTRA-ARTICULAR; INTRALESIONAL; INTRAMUSCULAR; INTRAVENOUS; SOFT TISSUE
Status: DISCONTINUED | OUTPATIENT
Start: 2025-03-24 | End: 2025-03-24 | Stop reason: HOSPADM

## 2025-03-24 RX ORDER — NALOXONE HYDROCHLORIDE 0.4 MG/ML
0.2 INJECTION, SOLUTION INTRAMUSCULAR; INTRAVENOUS; SUBCUTANEOUS
Status: DISCONTINUED | OUTPATIENT
Start: 2025-03-24 | End: 2025-03-26 | Stop reason: HOSPADM

## 2025-03-24 RX ORDER — BISACODYL 10 MG
10 SUPPOSITORY, RECTAL RECTAL DAILY PRN
Status: DISCONTINUED | OUTPATIENT
Start: 2025-03-24 | End: 2025-03-26 | Stop reason: HOSPADM

## 2025-03-24 RX ORDER — ONDANSETRON 2 MG/ML
INJECTION INTRAMUSCULAR; INTRAVENOUS PRN
Status: DISCONTINUED | OUTPATIENT
Start: 2025-03-24 | End: 2025-03-24

## 2025-03-24 RX ORDER — CELECOXIB 100 MG/1
100 CAPSULE ORAL 2 TIMES DAILY
Status: COMPLETED | OUTPATIENT
Start: 2025-03-24 | End: 2025-03-26

## 2025-03-24 RX ORDER — DEXAMETHASONE SODIUM PHOSPHATE 4 MG/ML
INJECTION, SOLUTION INTRA-ARTICULAR; INTRALESIONAL; INTRAMUSCULAR; INTRAVENOUS; SOFT TISSUE PRN
Status: DISCONTINUED | OUTPATIENT
Start: 2025-03-24 | End: 2025-03-24

## 2025-03-24 RX ORDER — FENTANYL CITRATE 50 UG/ML
25 INJECTION, SOLUTION INTRAMUSCULAR; INTRAVENOUS EVERY 5 MIN PRN
Status: DISCONTINUED | OUTPATIENT
Start: 2025-03-24 | End: 2025-03-24 | Stop reason: HOSPADM

## 2025-03-24 RX ORDER — SODIUM CHLORIDE, SODIUM LACTATE, POTASSIUM CHLORIDE, CALCIUM CHLORIDE 600; 310; 30; 20 MG/100ML; MG/100ML; MG/100ML; MG/100ML
INJECTION, SOLUTION INTRAVENOUS CONTINUOUS
Status: DISCONTINUED | OUTPATIENT
Start: 2025-03-24 | End: 2025-03-24 | Stop reason: HOSPADM

## 2025-03-24 RX ORDER — TRANEXAMIC ACID 650 MG/1
1950 TABLET ORAL ONCE
Status: COMPLETED | OUTPATIENT
Start: 2025-03-24 | End: 2025-03-24

## 2025-03-24 RX ORDER — ONDANSETRON 2 MG/ML
4 INJECTION INTRAMUSCULAR; INTRAVENOUS EVERY 30 MIN PRN
Status: DISCONTINUED | OUTPATIENT
Start: 2025-03-24 | End: 2025-03-24 | Stop reason: HOSPADM

## 2025-03-24 RX ORDER — NALOXONE HYDROCHLORIDE 0.4 MG/ML
0.4 INJECTION, SOLUTION INTRAMUSCULAR; INTRAVENOUS; SUBCUTANEOUS
Status: DISCONTINUED | OUTPATIENT
Start: 2025-03-24 | End: 2025-03-26 | Stop reason: HOSPADM

## 2025-03-24 RX ORDER — HYDROXYZINE HYDROCHLORIDE 25 MG/1
25 TABLET, FILM COATED ORAL EVERY 6 HOURS PRN
Qty: 60 TABLET | Refills: 2 | Status: SHIPPED | OUTPATIENT
Start: 2025-03-24

## 2025-03-24 RX ORDER — HYDROCODONE BITARTRATE AND ACETAMINOPHEN 5; 325 MG/1; MG/1
1-2 TABLET ORAL EVERY 4 HOURS PRN
Qty: 30 TABLET | Refills: 0 | Status: SHIPPED | OUTPATIENT
Start: 2025-03-24

## 2025-03-24 RX ORDER — FENTANYL CITRATE 50 UG/ML
50 INJECTION, SOLUTION INTRAMUSCULAR; INTRAVENOUS EVERY 5 MIN PRN
Status: DISCONTINUED | OUTPATIENT
Start: 2025-03-24 | End: 2025-03-24 | Stop reason: HOSPADM

## 2025-03-24 RX ORDER — HYDROMORPHONE HCL IN WATER/PF 6 MG/30 ML
0.2 PATIENT CONTROLLED ANALGESIA SYRINGE INTRAVENOUS EVERY 4 HOURS PRN
Status: DISCONTINUED | OUTPATIENT
Start: 2025-03-24 | End: 2025-03-26 | Stop reason: HOSPADM

## 2025-03-24 RX ORDER — ACETAMINOPHEN 325 MG/1
1000 TABLET ORAL EVERY 8 HOURS PRN
Qty: 60 TABLET | Refills: 0 | Status: SHIPPED | OUTPATIENT
Start: 2025-03-24

## 2025-03-24 RX ORDER — HYDROMORPHONE HCL IN WATER/PF 6 MG/30 ML
0.1 PATIENT CONTROLLED ANALGESIA SYRINGE INTRAVENOUS EVERY 4 HOURS PRN
Status: DISCONTINUED | OUTPATIENT
Start: 2025-03-24 | End: 2025-03-26 | Stop reason: HOSPADM

## 2025-03-24 RX ORDER — TEMAZEPAM 15 MG/1
15 CAPSULE ORAL AT BEDTIME
Status: DISCONTINUED | OUTPATIENT
Start: 2025-03-24 | End: 2025-03-26 | Stop reason: HOSPADM

## 2025-03-24 RX ORDER — NALOXONE HYDROCHLORIDE 0.4 MG/ML
0.1 INJECTION, SOLUTION INTRAMUSCULAR; INTRAVENOUS; SUBCUTANEOUS
Status: DISCONTINUED | OUTPATIENT
Start: 2025-03-24 | End: 2025-03-24 | Stop reason: HOSPADM

## 2025-03-24 RX ORDER — CEFAZOLIN SODIUM 1 G/3ML
1 INJECTION, POWDER, FOR SOLUTION INTRAMUSCULAR; INTRAVENOUS EVERY 8 HOURS
Status: COMPLETED | OUTPATIENT
Start: 2025-03-24 | End: 2025-03-25

## 2025-03-24 RX ORDER — LIDOCAINE 40 MG/G
CREAM TOPICAL
Status: DISCONTINUED | OUTPATIENT
Start: 2025-03-24 | End: 2025-03-26 | Stop reason: HOSPADM

## 2025-03-24 RX ORDER — LIDOCAINE HYDROCHLORIDE 20 MG/ML
INJECTION, SOLUTION INFILTRATION; PERINEURAL PRN
Status: DISCONTINUED | OUTPATIENT
Start: 2025-03-24 | End: 2025-03-24

## 2025-03-24 RX ORDER — HYDROXYZINE HYDROCHLORIDE 25 MG/1
25 TABLET, FILM COATED ORAL EVERY 6 HOURS PRN
Status: DISCONTINUED | OUTPATIENT
Start: 2025-03-24 | End: 2025-03-26 | Stop reason: HOSPADM

## 2025-03-24 RX ORDER — BUPIVACAINE HYDROCHLORIDE 7.5 MG/ML
INJECTION, SOLUTION INTRASPINAL PRN
Status: DISCONTINUED | OUTPATIENT
Start: 2025-03-24 | End: 2025-03-24

## 2025-03-24 RX ORDER — HYDROMORPHONE HCL IN WATER/PF 6 MG/30 ML
0.4 PATIENT CONTROLLED ANALGESIA SYRINGE INTRAVENOUS EVERY 5 MIN PRN
Status: DISCONTINUED | OUTPATIENT
Start: 2025-03-24 | End: 2025-03-24 | Stop reason: HOSPADM

## 2025-03-24 RX ORDER — AMOXICILLIN 250 MG
1 CAPSULE ORAL 2 TIMES DAILY
Status: DISCONTINUED | OUTPATIENT
Start: 2025-03-24 | End: 2025-03-26 | Stop reason: HOSPADM

## 2025-03-24 RX ORDER — POLYETHYLENE GLYCOL 3350 17 G/17G
17 POWDER, FOR SOLUTION ORAL DAILY
Status: DISCONTINUED | OUTPATIENT
Start: 2025-03-25 | End: 2025-03-26 | Stop reason: HOSPADM

## 2025-03-24 RX ORDER — MAGNESIUM HYDROXIDE 1200 MG/15ML
LIQUID ORAL PRN
Status: DISCONTINUED | OUTPATIENT
Start: 2025-03-24 | End: 2025-03-24 | Stop reason: HOSPADM

## 2025-03-24 RX ORDER — OXYCODONE HYDROCHLORIDE 5 MG/1
5 TABLET ORAL EVERY 4 HOURS PRN
Status: DISCONTINUED | OUTPATIENT
Start: 2025-03-24 | End: 2025-03-26 | Stop reason: HOSPADM

## 2025-03-24 RX ORDER — PROPOFOL 10 MG/ML
INJECTION, EMULSION INTRAVENOUS CONTINUOUS PRN
Status: DISCONTINUED | OUTPATIENT
Start: 2025-03-24 | End: 2025-03-24

## 2025-03-24 RX ORDER — ACETAMINOPHEN 500 MG
1000 TABLET ORAL EVERY 8 HOURS
Status: DISCONTINUED | OUTPATIENT
Start: 2025-03-24 | End: 2025-03-26 | Stop reason: HOSPADM

## 2025-03-24 RX ORDER — CEFAZOLIN SODIUM/WATER 2 G/20 ML
2 SYRINGE (ML) INTRAVENOUS SEE ADMIN INSTRUCTIONS
Status: DISCONTINUED | OUTPATIENT
Start: 2025-03-24 | End: 2025-03-24 | Stop reason: HOSPADM

## 2025-03-24 RX ORDER — PROCHLORPERAZINE MALEATE 5 MG/1
5 TABLET ORAL EVERY 6 HOURS PRN
Status: DISCONTINUED | OUTPATIENT
Start: 2025-03-24 | End: 2025-03-26 | Stop reason: HOSPADM

## 2025-03-24 RX ORDER — CEFAZOLIN SODIUM/WATER 2 G/20 ML
2 SYRINGE (ML) INTRAVENOUS
Status: COMPLETED | OUTPATIENT
Start: 2025-03-24 | End: 2025-03-24

## 2025-03-24 RX ORDER — LISINOPRIL 10 MG/1
10 TABLET ORAL DAILY
Status: DISCONTINUED | OUTPATIENT
Start: 2025-03-24 | End: 2025-03-26 | Stop reason: HOSPADM

## 2025-03-24 RX ORDER — METOPROLOL SUCCINATE 50 MG/1
100 TABLET, EXTENDED RELEASE ORAL DAILY
Status: DISCONTINUED | OUTPATIENT
Start: 2025-03-24 | End: 2025-03-26 | Stop reason: HOSPADM

## 2025-03-24 RX ORDER — HYDROMORPHONE HCL IN WATER/PF 6 MG/30 ML
0.2 PATIENT CONTROLLED ANALGESIA SYRINGE INTRAVENOUS EVERY 5 MIN PRN
Status: DISCONTINUED | OUTPATIENT
Start: 2025-03-24 | End: 2025-03-24 | Stop reason: HOSPADM

## 2025-03-24 RX ORDER — ONDANSETRON 4 MG/1
4 TABLET, ORALLY DISINTEGRATING ORAL EVERY 6 HOURS PRN
Status: DISCONTINUED | OUTPATIENT
Start: 2025-03-24 | End: 2025-03-26 | Stop reason: HOSPADM

## 2025-03-24 RX ORDER — ONDANSETRON 4 MG/1
4 TABLET, ORALLY DISINTEGRATING ORAL EVERY 30 MIN PRN
Status: DISCONTINUED | OUTPATIENT
Start: 2025-03-24 | End: 2025-03-24 | Stop reason: HOSPADM

## 2025-03-24 RX ORDER — CELECOXIB 200 MG/1
200 CAPSULE ORAL DAILY
Qty: 14 CAPSULE | Refills: 0 | Status: SHIPPED | OUTPATIENT
Start: 2025-03-24 | End: 2025-04-07

## 2025-03-24 RX ORDER — AMOXICILLIN 250 MG
1-2 CAPSULE ORAL 2 TIMES DAILY
Qty: 30 TABLET | Refills: 0 | Status: SHIPPED | OUTPATIENT
Start: 2025-03-24

## 2025-03-24 RX ADMIN — SODIUM CHLORIDE, SODIUM LACTATE, POTASSIUM CHLORIDE, AND CALCIUM CHLORIDE: .6; .31; .03; .02 INJECTION, SOLUTION INTRAVENOUS at 15:20

## 2025-03-24 RX ADMIN — CEFAZOLIN 1 G: 1 INJECTION, POWDER, FOR SOLUTION INTRAMUSCULAR; INTRAVENOUS at 20:36

## 2025-03-24 RX ADMIN — BUPIVACAINE HYDROCHLORIDE IN DEXTROSE 1.4 ML: 7.5 INJECTION, SOLUTION SUBARACHNOID at 12:49

## 2025-03-24 RX ADMIN — ONDANSETRON 4 MG: 2 INJECTION INTRAMUSCULAR; INTRAVENOUS at 12:52

## 2025-03-24 RX ADMIN — Medication 2 G: at 12:44

## 2025-03-24 RX ADMIN — MIDAZOLAM 2 MG: 1 INJECTION INTRAMUSCULAR; INTRAVENOUS at 12:27

## 2025-03-24 RX ADMIN — SODIUM CHLORIDE, SODIUM LACTATE, POTASSIUM CHLORIDE, AND CALCIUM CHLORIDE: .6; .31; .03; .02 INJECTION, SOLUTION INTRAVENOUS at 11:44

## 2025-03-24 RX ADMIN — AMITRIPTYLINE HYDROCHLORIDE 25 MG: 25 TABLET, FILM COATED ORAL at 18:19

## 2025-03-24 RX ADMIN — TEMAZEPAM 15 MG: 15 CAPSULE ORAL at 20:36

## 2025-03-24 RX ADMIN — METOPROLOL SUCCINATE 100 MG: 50 TABLET, EXTENDED RELEASE ORAL at 17:04

## 2025-03-24 RX ADMIN — TRANEXAMIC ACID 1950 MG: 650 TABLET ORAL at 11:16

## 2025-03-24 RX ADMIN — ACETAMINOPHEN 1000 MG: 500 TABLET, FILM COATED ORAL at 17:04

## 2025-03-24 RX ADMIN — PROPOFOL 150 MCG/KG/MIN: 10 INJECTION, EMULSION INTRAVENOUS at 12:52

## 2025-03-24 RX ADMIN — CELECOXIB 100 MG: 100 CAPSULE ORAL at 20:36

## 2025-03-24 RX ADMIN — BUPIVACAINE HYDROCHLORIDE 15 ML: 5 INJECTION, SOLUTION EPIDURAL; INTRACAUDAL; PERINEURAL at 12:25

## 2025-03-24 RX ADMIN — DEXAMETHASONE SODIUM PHOSPHATE 4 MG: 4 INJECTION, SOLUTION INTRA-ARTICULAR; INTRALESIONAL; INTRAMUSCULAR; INTRAVENOUS; SOFT TISSUE at 12:52

## 2025-03-24 RX ADMIN — SENNOSIDES AND DOCUSATE SODIUM 1 TABLET: 50; 8.6 TABLET ORAL at 20:36

## 2025-03-24 RX ADMIN — OXYCODONE HYDROCHLORIDE 2.5 MG: 5 TABLET ORAL at 18:19

## 2025-03-24 RX ADMIN — LIDOCAINE HYDROCHLORIDE 20 MG: 20 INJECTION, SOLUTION INFILTRATION; PERINEURAL at 12:52

## 2025-03-24 ASSESSMENT — ENCOUNTER SYMPTOMS
SEIZURES: 0
DYSRHYTHMIAS: 1

## 2025-03-24 ASSESSMENT — ACTIVITIES OF DAILY LIVING (ADL)
ADLS_ACUITY_SCORE: 34
ADLS_ACUITY_SCORE: 57
ADLS_ACUITY_SCORE: 33
ADLS_ACUITY_SCORE: 32
ADLS_ACUITY_SCORE: 34
ADLS_ACUITY_SCORE: 30
ADLS_ACUITY_SCORE: 34
ADLS_ACUITY_SCORE: 30
ADLS_ACUITY_SCORE: 32
ADLS_ACUITY_SCORE: 34
ADLS_ACUITY_SCORE: 30
ADLS_ACUITY_SCORE: 32
ADLS_ACUITY_SCORE: 30

## 2025-03-24 ASSESSMENT — LIFESTYLE VARIABLES: TOBACCO_USE: 1

## 2025-03-24 NOTE — ANESTHESIA PROCEDURE NOTES
"Adductor canal and Femoral Procedure Note    Pre-Procedure   Staff -        Anesthesiologist:  Brayden Bond MD       Performed By: Anesthesiologist       Location: pre-op       Pre-Anesthestic Checklist: patient identified, IV checked, site marked, risks and benefits discussed, informed consent, monitors and equipment checked, pre-op evaluation, at physician/surgeon's request and post-op pain management  Timeout:       Correct Patient: Yes        Correct Procedure: Yes        Correct Site: Yes        Correct Position: Yes        Correct Laterality: Yes        Site Marked: Yes  Procedure Documentation  Procedure: Adductor canal, Femoral         Laterality: right       Patient Position: supine       Patient Prep/Sterile Barriers: sterile gloves, mask, \"No-touch\" technique       Skin prep: Chloraprep       Local skin infiltrated with 3 mL of 1% lidocaine.        Insertion site: upper, medial thigh.       Needle Type: insulated (8 cm P[place]unk)       Needle Gauge: 22.        Needle Length (millimeters): 90        Ultrasound guided (permanent image entered into patient's record)       1. Ultrasound was used to identify targeted nerve, plexus, vascular marker, or fascial plane and place a needle adjacent to it in real-time.       2. Ultrasound was used to visualize the spread of anesthetic in close proximity to the above referenced structure.       3. A permanent image is entered into the patient's record.       4. The visualized anatomic structures appeared normal.       5. There were no apparent abnormal pathologic findings.    Assessment/Narrative         The placement was negative for: blood aspirated, painful injection and site bleeding       Paresthesias: No.       Test dose of mL at.         Test dose negative, 3 minutes after injection, for signs of intravascular, subdural, or intrathecal injection.       Bolus given via needle..        Secured via.        Insertion/Infusion Method: Single Shot       " "Complications: none       Injection made incrementally with aspirations every 5 mL.    Medication(s) Administered   Bupivacaine 0.5% w/ 1:400K Epi (Injection) - Injection   15 mL - 3/24/2025 12:25:00 PM   Comments:  15 cc of 0.5% Bupivacaine with epinephrine (1:400K) injected on the anterior side of the femoral artery, in close proximity to the femoral nerve branches via the adductor canal approach.    Pt tolerated well.    No complications.      Ultrasound Interpretation, Peripheral Nerve Block    1. Under ultrasound guidance, the needle was inserted and placed in close proximity to the target nerve(s).  2. Ultrasound was also used to visualize the spread of the anesthetic in close proximity to the nerve(s) being blocked.  Local anesthetic was administered in incremental doses, with intermittent negative aspiration.    3. The nerve(s) appeared anatomically normal.  4. There were no apparent abnormal pathological findings.  5. A permanent ultrasound image was saved in the patient's record.    The surgeon has given a verbal order transferring care of this patient to me for the performance of a regional analgesia block for post-op pain control. It is requested of me because I am uniquely trained and qualified to perform this block and the surgeon is neither trained nor qualified to perform this procedure.    Brayden Bond MD   12:38 PM          FOR [facility] ([facility]) ONLY:   Pain Team Contact information: please page the Pain Team Via Personal Style F[employer]. Search \"Pain\". During daytime hours, please page the attending first. At night please page the resident first.      "

## 2025-03-24 NOTE — OP NOTE
[facility]  OPERATIVE NOTE    Procedure Date: 3/24/2025     PREOPERATIVE DIAGNOSIS:   Right knee degenerative joint disease.     POSTOPERATIVE DIAGNOSIS:  RIght knee degenerative joint disease.     PROCEDURE:  Right total knee arthroplasty.     SURGEON:  Fer Montero MD     FIRST ASSISTANT:  Nadiya Bliss PA-C       ANESTHESIA TYPE:  Spinal with adductor canal and periarticular injection.     TOURNIQUET TIME:  60 minutes.     IMPLANTS:  Avi and Avi Attune size 5 posterior stabilized femoral component, size 5 fixed bearing tibial component, 8 mm highly cross-linked posterior stabilized fixed bearing poly, 38 mm patellar component.     DESCRIPTION OF PROCEDURE:  After identification of the patient, correct extremity, review of informed consent, the patient was brought to the operating room where spinal anesthesia was induced.  Perioperative antibiotics were given.  A nonsterile tourniquet was applied to the operative lower extremity.  The operative extremity was then prepped and draped in the usual sterile manner.  After observation of surgical pause, leg was exsanguinated and tourniquet inflated.  Standard midline incision was made.  Dissection was taken sharply through subcutaneous tissues.  Medial and lateral skin flaps were elevated.  Medial release was then performed.  Soft tissue was cleared from the distal anterior aspect of the femur.  Retropatellar fat pad was excised.  Patella was everted, knee was flexed.  A starting reamer was placed in the femoral canal from distal femoral starting point.  Distal femoral cutting guide set on a 5-degree valgus cut and a 10 mm resection was then placed and pinned.  Distal femur was resected.  Extramedullary tibial guide was then placed in line with the anteromedial border of the tibia on a 0-degree slope and a 10 mm resection off the lateral side consistent with preoperative templating.  Proximal tibia was resected.  Extension gap was then balanced with a 8mm  spacer block.  Knee was flexed and then sized to a size 5 femoral component.  A 4-in-1 cutting guide was then pinned in 3 degrees of external rotation.  Anterior and posterior femoral cuts were made.  Flexion gap was balanced with a  8 mm spacer block.  Anterior, posterior and chamfer cuts were made.  A 4-in-1 cutting guide was removed.  Box cutting guide was placed.  Box cut was made.  Medial and lateral meniscus were excised.  Osteophytes were removed from the posterior aspect of the lateral femoral condyles with a curved osteotome.     The tibia was then subluxated anteriorly with a 2-prong tibial retractor, sized to a size 5 tibial component and external rotation was set at the junction of medial and middle third of the tibial tubercle.  Proximal tibia was then prepared.  Trial tray was left in place.  Trial femoral component was impacted into place.  The 8mm fixed bearing posterior stabilized trial poly was then placed and the knee was reduced.  The knee was noted to have 1 degree of hyperextension, flexion back to 130 degrees.  The patella tracked centrally.  The patella was measured and noted to be 22 mm in thickness.  A 8 mm resection for a 14 mm remnant was then performed.  A 38 mm patellar component was noted to be the appropriate size.  Lug holes were drilled.  Trial component was placed.  Again, the patella tracked centrally.  Trial components were removed.  Periarticular injection was then performed.  Cut ends of bone were irrigated with copious normal saline via pulse lavage.  The real size 5 tibial component was then cemented in place.  Real size 5 femoral component was cemented into place.  The real 8 mm fixed bearing posterior stabilized, highly cross-linked poly was then impacted into place.  The real 38 mm patellar component was then cemented in place and clamped.  Cement was allowed to harden in Betadine diluted solution.  After adequate hardening of the cement, the knee was again assessed for  range of motion, stability and patellar tracking and was as previously noted.  The knee was then placed in 90 degrees of flexion.       The arthrotomy was closed with interrupted 0 Vicryl suture, 2-0 Monocryl was placed subcutaneously in the incision sites.  A running self-locking absorbable 3-0 intracuticular stitch was placed.  Sterile dressing was applied.  Tourniquet was deflated.  The patient was then recovered briefly in the operating room and transferred to the PACU for further recovery.  The patient tolerated the procedure well.  There were no known complications.     Nadiya Bliss was present the entire portion of procedure.  An assistant was critical in both patient positioning, prepping and draping, deep wound closure, superficial wound closure, dressing placement and patient transfer.

## 2025-03-24 NOTE — PROGRESS NOTES
Pt arrived from PACU around 1520. A&Ox4. VSS on RA. Reg diet. Not OOB yet, DTV. L PIV infusing LR @100cc/hr. Denies pain, baseline neuropathy BLE and BUE.  Possible discharge home pending medical stability, Continue plan of Care.

## 2025-03-24 NOTE — BRIEF OP NOTE
[facility]    Brief Operative Note    Pre-operative diagnosis: Osteoarthrosis of knee [M17.9]  Osteoarthritis of right knee [M17.11]  Post-operative diagnosis Same as pre-operative diagnosis    Procedure: RIGHT TOTAL KNEE ARTHROPLASTY, Right - Knee    Surgeon: Surgeons and Role:     * Fer Montero MD - Primary     * Nadiya Bliss PA-C - Assisting  Anesthesia: General with Block   Estimated Blood Loss: Less than 50 ml    Drains: None  Specimens: * No specimens in log *  Findings:   None.  Complications: None.  Implants:   Implant Name Type Inv. Item Serial No.  Lot No. LRB No. Used Action   IMP BONE CEMENT STRK SIMPLEX SPEEDSET 6192-1-001 - [id number] Cement, Bone IMP BONE CEMENT STRK SIMPLEX SPEEDSET 6192-1-001  [employer] [id number] Right 1 Implanted   IMP INSERT JJ ATTUNE POST STAB FX BR SYS SZ5 8MM [id number] - [id number] Total Joint Component/Insert IMP INSERT JJ ATTUNE POST STAB FX BR SYS SZ5 8MM [id number]  [employer]- [id number] Right 1 Implanted   IMP PATELLA JJ ATTUNE DOME 38MM [id number] - [id number] Total Joint Component/Insert IMP PATELLA JJ ATTUNE DOME 38MM [id number]  [employer]- [id number] Right 1 Implanted   IMP TIB BASE JJ ATTUNE FX BR SYS ANKIT SZ 5 [id number] - [id number] Total Joint Component/Insert IMP TIB BASE JJ ATTUNE FX BR SYS ANKIT SZ 5 [id number]  [employer]- [id number] Right 1 Implanted   IMP COMP FEM JJ ATTUNE POST STAB RT ANKIT SZ5 [id number] - [id number] Total Joint Component/Insert IMP COMP FEM JJ ATTUNE POST STAB RT ANKIT SZ5 [id number]  [employer]- [id number] Right 1 Implanted

## 2025-03-24 NOTE — PROGRESS NOTES
03/24/25 1800   Appointment Info   Signing Clinician's Name / Credentials (PT) Nikita Maldonado DPT   Rehab Comments (PT) WBAT RLE, ROM as tolerated   Living Environment   People in Home alone   Current Living Arrangements condominium   Home Accessibility stairs to enter home   Number of Stairs, Main Entrance 1   Transportation Anticipated family or friend will provide   Living Environment Comments Pt lives in senior living condo, [address]   Self-Care   Usual Activity Tolerance good   Current Activity Tolerance moderate   Equipment Currently Used at Home none   Fall history within last six months no   Activity/Exercise/Self-Care Comment At baseline pt IND with mobility and ADL's, has FWW to use   General Information   Onset of Illness/Injury or Date of Surgery 03/24/25   Referring Physician Nadiya Bliss PA-C   Patient/Family Therapy Goals Statement (PT) go home   Pertinent History of Current Problem (include personal factors and/or comorbidities that impact the POC) 80 y.o. female s/p R TKA on 3/24/2025, POD#0   Existing Precautions/Restrictions fall   Weight-Bearing Status - LLE full weight-bearing   Weight-Bearing Status - RLE weight-bearing as tolerated   Cognition   Affect/Mental Status (Cognition) WFL;anxious   Orientation Status (Cognition) oriented x 4   Follows Commands (Cognition) WNL   Pain Assessment   Patient Currently in Pain Yes, see Vital Sign flowsheet  (6/10 R knee)   Integumentary/Edema   Integumentary/Edema no deficits were identifed   Posture    Posture Not impaired   Range of Motion (ROM)   Range of Motion ROM deficits secondary to surgical procedure;ROM deficits secondary to pain;ROM deficits secondary to weakness   ROM Comment R knee AROM ~0-45 in supine   Strength (Manual Muscle Testing)   Strength (Manual Muscle Testing) Able to perform R SLR;Able to perform L SLR;Deficits observed during functional mobility   Strength Comments weakness in R knee post TKA but can demo good quad set and SLR    Bed Mobility   Comment, (Bed Mobility) SBA   Transfers   Comment, (Transfers) CGA with FWW   Gait/Stairs (Locomotion)   [place] Level (Gait) contact guard   Assistive Device (Gait) walker, front-wheeled   Distance in Feet (Gait) 10' eval   Pattern (Gait) step-through   Deviations/Abnormal Patterns (Gait) antalgic   Maintains Weight-bearing Status (Gait) able to maintain   Balance   Balance Comments impaired dynamic balance but able to amb with FWW and asst x 1   Sensory Examination   Sensory Perception Comments baseline bilat LE neuropathy   Clinical Impression   Criteria for Skilled Therapeutic Intervention Yes, treatment indicated   PT Diagnosis (PT) impaired gait   Influenced by the following impairments pain, ROM, strength, balance   Functional limitations due to impairments decreased ind with functiional mobility   Clinical Presentation (PT Evaluation Complexity) stable   Clinical Presentation Rationale clinical judgement   Clinical Decision Making (Complexity) low complexity   Planned Therapy Interventions (PT) balance training;bed mobility training;cryotherapy;gait training;home exercise program;patient/family education;ROM (range of motion);stair training;strengthening;stretching;transfer training;progressive activity/exercise   Risk & Benefits of therapy have been explained evaluation/treatment results reviewed;care plan/treatment goals reviewed;risks/benefits reviewed;current/potential barriers reviewed;participants voiced agreement with care plan;participants included;patient   PT Total Evaluation Time   PT Eval, Low Complexity Minutes (49321) 10   Physical Therapy Goals   PT Frequency 2x/day   PT Predicted Duration/Target Date for Goal Attainment 03/25/25   PT Goals Bed Mobility;Transfers;Gait;Stairs   PT: Bed Mobility Independent;Supine to/from sit;Within precautions   PT: Transfers Supervision/stand-by assist;Assistive device   PT: Gait Supervision/stand-by assist;Rolling walker;150 feet   PT:  Stairs Minimal assist;1 stair   Interventions   Interventions Quick Adds Gait Training;Therapeutic Activity;Therapeutic Procedure   Therapeutic Procedure/Exercise   Ther. Procedure: strength, endurance, ROM, flexibillity Minutes (83216) 5   Symptoms Noted During/After Treatment increased pain   Treatment Detail/Skilled Intervention Educated on post surgical benefits of TE on circulation, functional ROM, and strength. Left pt with TE handout. With pt in supine cued for AP's x 20, on LLE: quad sets x 10, heel slides x 10. Pt tolerated all TE well   Therapeutic Activity   Therapeutic Activities: dynamic activities to improve functional performance Minutes (48328) 15   Symptoms Noted During/After Treatment Fatigue;Increased pain   Treatment Detail/Skilled Intervention Greeted pt supine in bed. Eval completed. Pt a little anxious during session. Educated on orders for WBAT, ROM to flexion tolerance, and importance of keeping knee straight when resting and demonstrated how to do so. With HOB elevated supine<>sit SBA, cues for sequencing provided. Pt able to reposition in bed IND. Sit<>stand x 3 with FWW CGA-SBA, cues for safe walker and hand placement and pt able to demo back well. Increased time for line management and room set up   Gait Training   Gait Training Minutes (28096) 6   Symptoms Noted During/After Treatment (Gait Training) fatigue;increased pain   Treatment Detail/Skilled Intervention With pt standing EOB cued for pre-gait marching in place, pt able to clear both feet, no knee buckling, mildly unstable. Pt then amb in room ~50' with FWW, slow speed and nandini, short step through pattern, cues for even step lengths. No LOB but slightly unsteady. Pt reporting pain up to 6/10 during and limited walking distance due to pain   Distance in Feet 50   PT Discharge Planning   PT Plan review/progress HEP, progress gait with FWW, trial step, safe transfers   PT Discharge Recommendation (DC Rec)   (defer to ortho)   PT  Rationale for DC Rec Pt below baseline, able to amb ~60' total with FWW and CGA. Anticipate pt will progress and by discharge be at/near IND bed mobility, SBA transfers with FWW, SBA amb with FWW, [place] for steps. Pt lives alone in senior living condo, is hiring help to come over 1x/daily and has assist from friends living in the condo as needed   PT Brief overview of current status CGA with FWW - Goals of therapy will be to address safe mobility and make recs for d/c to next level of care. Pt and RN will continue to follow all falls risk precautions as documented by RN staff while hospitalized   PT Total Distance Amb During Session (feet) 60   Physical Therapy Time and Intention   Timed Code Treatment Minutes 26   Total Session Time (sum of timed and untimed services) 36

## 2025-03-24 NOTE — PROGRESS NOTES
Called infection prevention RN regarding patient being on contact isolation. IP RN ordered test to rule out CP- CRE and candida auris, patient is aware and refused. Education provided.  Continue contact precaution per IP RN.

## 2025-03-24 NOTE — ANESTHESIA PROCEDURE NOTES
"Intrathecal Procedure Note    Pre-Procedure   Staff -        Anesthesiologist:  Brayden Bond MD       Performed By: Anesthesiologist       Location: OR       Pre-Anesthestic Checklist: patient identified, IV checked, site marked, risks and benefits discussed, informed consent, monitors and equipment checked, pre-op evaluation and at physician/surgeon's request  Timeout:       Correct Patient: Yes        Correct Procedure: Yes        Correct Site: Yes        Correct Position: Yes   Procedure Documentation  Procedure: intrathecal         Patient Position: sitting       Patient Prep/Sterile Barriers: sterile gloves, mask, patient draped       Skin prep: Betadine       Insertion Site: L3-4. (midline approach).       Needle Gauge: 24.        Needle Length (Inches): 4        Spinal Needle Type: Pencan       Introducer used       Introducer: 20 G       # of attempts: 1 and  # of redirects:     Assessment/Narrative         Paresthesias: No.       CSF fluid: clear.       Opening pressure was cmH2O while  Sitting.       Comments:  Patient sitting on edge of OR bed, lower back cleaned and prepped in sterile fashion with betadine. 1% lido used to numb area. Introducer placed, spinal needle through introducer. Appropriate flow of CSF and confirmed with aspiration via syringe. Spinal dose given, 10.5 mg 0.75% bupivacaine with dextrose. No complications.       [place] [facility] [place]:   Pain Team Contact information: please page the Pain Team Via [employer]. Search \"Pain\". During daytime hours, please page the attending first. At night please page the resident first.      "

## 2025-03-24 NOTE — ANESTHESIA PREPROCEDURE EVALUATION
Anesthesia Pre-Procedure Evaluation    Patient: Sixto Jimenez   MRN: [id number] : 1945        Procedure : Procedure(s):  RIGHT TOTAL KNEE ARTHROPLASTY          Past Medical History:   Diagnosis Date    Allergic rhinitis     Chronic abdominal pain     Chronic bilateral low back pain     DDD    Chronic constipation     Closed fracture of part of fibula     Closed fractures of distal end of radius (alone), left     Colocutaneous fistula     Fibromyalgia     HTN (hypertension)     Hx of chemotherapy     Hyperlipidemia     Impingement of right shoulder     Malignant neoplasm of transverse colon (H)     Stage 3A  nodes positive: 10/6/2010 on colonoscopy: at [facility] [place]. Noted mass: Invasive adenocarcinoma: CEA 3.7, size 2 x 12.7 cm.    Mitral valve prolapse     Mixed hyperlipidemia 2019    Myalgia and myositis     Osteoarthritis of CMC joint of thumb, bilateral     Osteoporosis     PVC's (premature ventricular contractions)     Sciatica of left side     Urethral meatal stenosis     Dilated twice by Dr. Eaton of Urology      Past Surgical History:   Procedure Laterality Date    ARTHROPLASTY HIP Right 2023    Procedure: Right total hip arthroplasty;  Surgeon: Teo Sanchez MD;  Location:  OR    CHOLECYSTECTOMY, LAPOROSCOPIC      Cholecystectomy, Laparoscopic    COLON LYSIS OF ADHESIONS, ILEOSTOMY TAKE DOWN  2011    COLON SURGERY  10/14/2010    Colectomy    COLONOSCOPY  2014    Procedure: COLONOSCOPY;  Surgeon: Diana Vázquez MD;  Location:  GI    COLONOSCOPY N/A 2017    Procedure: COLONOSCOPY;  COLONOSCOPY (MAC) ;  Surgeon: Diana Vázquez MD;  Location:  GI    COLONOSCOPY      CT GUIDED ABDOMINAL DRAIN PLACEMENT  2012    CYSTOSCOPY      ESOPHAGOGASTRODUODENOSCOPY  2023    ESOPHAGOGASTRODUODENOSCOPY      EX LAP, ILEOCOLIC RESECTION, ILEOSTOMY  10/16/2010    EXPLORATORY LAPAROTOMY W/ BOWEL RESECTION  10/16/2010    ex lap,  "ileocolic resection, ileostomy for postoperative bowel perforation    EXPLORATORY LAPAROTOMY, EXTENSIVE LYSIS OF ADHESIONS, RESECTION OF ILEOCOLIC ANASTOMOSIS, END ILEOSTOMY, REMOVAL OF MESH  2012    FRACTURED FIBULA      FRACTURED WRIST      ILEOSTOMY      leaks afterwards    IR COLON W/ CONTRAST: MULTIPLE LOOPS OF REDUNDANT COLON, PROMINENT SIGMOID.  2004    PORTACATH PLACEMENT & REMOVAL      SHOULDER REPLACEMENT Right 2024    TONSILLECTOMY      TUBAL LIGATION        Allergies   Allergen Reactions    Alendronate GI Disturbance     Shut down system  Constipation    Morphine Headache     Mental status change \"felt Psychotic\"    Risedronate GI Disturbance     Shut down system  Constipation      Social History     Tobacco Use    Smoking status: Former     Current packs/day: 0.00     Average packs/day: 0.5 packs/day for 12.0 years (6.0 ttl pk-yrs)     Types: Cigarettes     Start date: 1968     Quit date: 1980     Years since quittin.2    Smokeless tobacco: Never   Substance Use Topics    Alcohol use: Not Currently      Wt Readings from Last 1 Encounters:   23 65.3 kg (144 lb)        Anesthesia Evaluation   Pt has had prior anesthetic.     No history of anesthetic complications       ROS/MED HX  ENT/Pulmonary:     (+)           allergic rhinitis,     tobacco use, Past use,                    (-) sleep apnea   Neurologic:    (-) no seizures and no CVA   Cardiovascular:     (+) Dyslipidemia hypertension- -   -  - -                        dysrhythmias, PVCs, Irregular Heartbeat/Palpitations (PVC's), valvular problems/murmurs type: MVP          METS/Exercise Tolerance:     Hematologic:     (+)      anemia,          Musculoskeletal: Comment: Osteoporosis    PMR on chronic prednisone, on prednisone taper, currently 8 mg every day    Back pain  (+)  arthritis,             GI/Hepatic: Comment:  chronic constipation   (-) GERD and liver disease   Renal/Genitourinary: Comment: Urethral stenosis    " "(-) renal disease   Endo:     (+)            Chronic steroid usage (PMR) for Other.      (-) Type II DM and thyroid disease   Psychiatric/Substance Use:     (+) psychiatric history anxiety       Infectious Disease:       Malignancy:   (+) Malignancy, History of GI.    Other:      (+)  , H/O Chronic Pain,         Physical Exam    Airway        Mallampati: II   TM distance: > 3 FB   Neck ROM: full   Mouth opening: > 3 cm    Respiratory Devices and Support         Dental       (+) Minor Abnormalities - some fillings, tiny chips      Cardiovascular   cardiovascular exam normal          Pulmonary   pulmonary exam normal                OUTSIDE LABS:  CBC:   Lab Results   Component Value Date    WBC 10.2 09/27/2023    WBC 12.7 (H) 09/23/2023    HGB 9.8 (L) 09/27/2023    HGB 9.4 (L) 09/23/2023    HCT 30.3 (L) 09/27/2023    HCT 28.7 (L) 09/23/2023     09/27/2023     09/23/2023     BMP:   Lab Results   Component Value Date     (L) 09/27/2023     (L) 09/22/2023    POTASSIUM 4.1 09/27/2023    POTASSIUM 4.3 09/22/2023    CHLORIDE 97 (L) 09/27/2023    CHLORIDE 98 09/22/2023    CO2 21 (L) 09/27/2023    CO2 24 09/22/2023    BUN 10.7 09/27/2023    BUN 21.3 09/22/2023    CR 0.69 09/27/2023    CR 0.87 09/22/2023    GLC 99 09/27/2023     (H) 09/24/2023     COAGS: No results found for: \"PTT\", \"INR\", \"FIBR\"  POC: No results found for: \"BGM\", \"HCG\", \"HCGS\"  HEPATIC:   Lab Results   Component Value Date    ALBUMIN 3.5 09/21/2023    PROTTOTAL 5.6 (L) 09/21/2023    ALT 22 09/21/2023    AST 24 09/21/2023    ALKPHOS 209 (H) 09/21/2023    BILITOTAL 0.4 09/21/2023     OTHER:   Lab Results   Component Value Date    DU 9.1 09/27/2023       Anesthesia Plan    ASA Status:  3    NPO Status:  NPO Appropriate    Anesthesia Type: Spinal.              Consents    Anesthesia Plan(s) and associated risks, benefits, and realistic alternatives discussed. Questions answered and patient/representative(s) expressed " understanding.     - Discussed:     - Discussed with:  Patient            Postoperative Care    Pain management: Peripheral nerve block (Single Shot), IV analgesics.   PONV prophylaxis: Ondansetron (or other 5HT-3)     Comments:               Ant Moraes MD    Clinically Significant Risk Factors Present on Admission                   # Hypertension: Noted on problem list

## 2025-03-24 NOTE — ANESTHESIA CARE TRANSFER NOTE
Patient: Sixto Jimenez    Procedure: Procedure(s):  RIGHT TOTAL KNEE ARTHROPLASTY       Diagnosis: Osteoarthrosis of knee [M17.9]  Osteoarthritis of right knee [M17.11]  Diagnosis Additional Information: No value filed.    Anesthesia Type:   Spinal     Note:    Oropharynx: oropharynx clear of all foreign objects and spontaneously breathing  Level of Consciousness: awake  Oxygen Supplementation: face mask  Level of Supplemental Oxygen (L/min / FiO2): 6  Independent Airway: airway patency satisfactory and stable  Dentition: dentition unchanged  Vital Signs Stable: post-procedure vital signs reviewed and stable  Report to RN Given: handoff report given  Patient transferred to: PACU  Comments: At end of procedure, spontaneous respirations, patient alert to voice, able to follow commands. Patient breathing room air to PACU. Oxygen tubing connected to wall O2 in PACU, SpO2, NiBP, and EKG monitors and alarms on and functioning, report on patient's clinical status given to PACU RN, RN questions answered.          Handoff Report: Identifed the Patient, Identified the Reponsible Provider, Reviewed the pertinent medical history, Discussed the surgical course, Reviewed Intra-OP anesthesia mangement and issues during anesthesia, Set expectations for post-procedure period and Allowed opportunity for questions and acknowledgement of understanding  Vitals:  Vitals Value Taken Time   BP     Temp     Pulse     Resp     SpO2 99 % 03/24/25 1412   Vitals shown include unfiled device data.    Electronically Signed By: EMANUEL Hua CRNA  March 24, 2025  2:14 PM

## 2025-03-24 NOTE — ANESTHESIA POSTPROCEDURE EVALUATION
Patient: Sixto Jimenez    Procedure: Procedure(s):  RIGHT TOTAL KNEE ARTHROPLASTY       Anesthesia Type:  Spinal    Note:  Disposition: Inpatient   Postop Pain Control: Uneventful            Sign Out: Well controlled pain   PONV: No   Neuro/Psych: Uneventful            Sign Out: Acceptable/Baseline neuro status   Airway/Respiratory: Uneventful            Sign Out: Acceptable/Baseline resp. status   CV/Hemodynamics: Uneventful            Sign Out: Acceptable CV status   Other NRE: NONE   DID A NON-ROUTINE EVENT OCCUR? No    Event details/Postop Comments:  Spinal level regressing appropriately.  Moving bilateral lower extremities. Pain well controlled.           Last vitals:  Vitals Value Taken Time   /97 03/24/25 1416   Temp 35.9  C (96.62  F) 03/24/25 1423   Pulse 91 03/24/25 1423   Resp 9 03/24/25 1423   SpO2 99 % 03/24/25 1423   Vitals shown include unfiled device data.    Electronically Signed By: Brayden Bond MD  March 24, 2025  2:25 PM

## 2025-03-25 ENCOUNTER — APPOINTMENT (OUTPATIENT)
Dept: PHYSICAL THERAPY | Facility: CLINIC | Age: 80
DRG: 470 | End: 2025-03-25
Attending: ORTHOPAEDIC SURGERY
Payer: MEDICARE

## 2025-03-25 ENCOUNTER — APPOINTMENT (OUTPATIENT)
Dept: OCCUPATIONAL THERAPY | Facility: CLINIC | Age: 80
DRG: 470 | End: 2025-03-25
Attending: ORTHOPAEDIC SURGERY
Payer: MEDICARE

## 2025-03-25 LAB
CREAT SERPL-MCNC: 0.68 MG/DL (ref 0.51–0.95)
EGFRCR SERPLBLD CKD-EPI 2021: 89 ML/MIN/1.73M2
FASTING STATUS PATIENT QL REPORTED: NO
GLUCOSE SERPL-MCNC: 163 MG/DL (ref 70–99)
HGB BLD-MCNC: 10.7 G/DL (ref 11.7–15.7)

## 2025-03-25 PROCEDURE — 36415 COLL VENOUS BLD VENIPUNCTURE: CPT | Performed by: ORTHOPAEDIC SURGERY

## 2025-03-25 PROCEDURE — 97535 SELF CARE MNGMENT TRAINING: CPT | Mod: GO

## 2025-03-25 PROCEDURE — 97530 THERAPEUTIC ACTIVITIES: CPT | Mod: GP | Performed by: PHYSICAL THERAPY ASSISTANT

## 2025-03-25 PROCEDURE — 85018 HEMOGLOBIN: CPT | Performed by: PHYSICIAN ASSISTANT

## 2025-03-25 PROCEDURE — 250N000013 HC RX MED GY IP 250 OP 250 PS 637: Performed by: PHYSICIAN ASSISTANT

## 2025-03-25 PROCEDURE — 97110 THERAPEUTIC EXERCISES: CPT | Mod: GP | Performed by: PHYSICAL THERAPY ASSISTANT

## 2025-03-25 PROCEDURE — 258N000003 HC RX IP 258 OP 636: Performed by: PHYSICIAN ASSISTANT

## 2025-03-25 PROCEDURE — 82947 ASSAY GLUCOSE BLOOD QUANT: CPT | Performed by: ORTHOPAEDIC SURGERY

## 2025-03-25 PROCEDURE — 97166 OT EVAL MOD COMPLEX 45 MIN: CPT | Mod: GO

## 2025-03-25 PROCEDURE — 250N000011 HC RX IP 250 OP 636: Performed by: PHYSICIAN ASSISTANT

## 2025-03-25 PROCEDURE — 82565 ASSAY OF CREATININE: CPT | Performed by: ORTHOPAEDIC SURGERY

## 2025-03-25 RX ORDER — SODIUM CHLORIDE, SODIUM LACTATE, POTASSIUM CHLORIDE, CALCIUM CHLORIDE 600; 310; 30; 20 MG/100ML; MG/100ML; MG/100ML; MG/100ML
INJECTION, SOLUTION INTRAVENOUS ONCE
Status: COMPLETED | OUTPATIENT
Start: 2025-03-25 | End: 2025-03-25

## 2025-03-25 RX ORDER — AMOXICILLIN 250 MG
2 CAPSULE ORAL 2 TIMES DAILY PRN
Status: DISCONTINUED | OUTPATIENT
Start: 2025-03-25 | End: 2025-03-26 | Stop reason: HOSPADM

## 2025-03-25 RX ADMIN — OXYCODONE HYDROCHLORIDE 5 MG: 5 TABLET ORAL at 18:19

## 2025-03-25 RX ADMIN — POLYETHYLENE GLYCOL 3350 17 G: 17 POWDER, FOR SOLUTION ORAL at 08:49

## 2025-03-25 RX ADMIN — HYDROXYZINE HYDROCHLORIDE 25 MG: 25 TABLET ORAL at 18:20

## 2025-03-25 RX ADMIN — METOPROLOL SUCCINATE 100 MG: 50 TABLET, EXTENDED RELEASE ORAL at 08:50

## 2025-03-25 RX ADMIN — LISINOPRIL 10 MG: 10 TABLET ORAL at 08:50

## 2025-03-25 RX ADMIN — SENNOSIDES AND DOCUSATE SODIUM 1 TABLET: 50; 8.6 TABLET ORAL at 08:50

## 2025-03-25 RX ADMIN — OXYCODONE HYDROCHLORIDE 2.5 MG: 5 TABLET ORAL at 10:38

## 2025-03-25 RX ADMIN — SODIUM CHLORIDE, SODIUM LACTATE, POTASSIUM CHLORIDE, AND CALCIUM CHLORIDE 1000 ML: .6; .31; .03; .02 INJECTION, SOLUTION INTRAVENOUS at 12:12

## 2025-03-25 RX ADMIN — ACETAMINOPHEN 1000 MG: 500 TABLET, FILM COATED ORAL at 00:47

## 2025-03-25 RX ADMIN — SENNOSIDES AND DOCUSATE SODIUM 1 TABLET: 50; 8.6 TABLET ORAL at 20:28

## 2025-03-25 RX ADMIN — RIVAROXABAN 10 MG: 10 TABLET, FILM COATED ORAL at 08:51

## 2025-03-25 RX ADMIN — OXYCODONE HYDROCHLORIDE 5 MG: 5 TABLET ORAL at 22:20

## 2025-03-25 RX ADMIN — SODIUM CHLORIDE, SODIUM LACTATE, POTASSIUM CHLORIDE, AND CALCIUM CHLORIDE: .6; .31; .03; .02 INJECTION, SOLUTION INTRAVENOUS at 02:27

## 2025-03-25 RX ADMIN — SODIUM CHLORIDE, POTASSIUM CHLORIDE, SODIUM LACTATE AND CALCIUM CHLORIDE: 600; 310; 30; 20 INJECTION, SOLUTION INTRAVENOUS at 16:40

## 2025-03-25 RX ADMIN — CELECOXIB 100 MG: 100 CAPSULE ORAL at 08:50

## 2025-03-25 RX ADMIN — OXYCODONE HYDROCHLORIDE 2.5 MG: 5 TABLET ORAL at 09:53

## 2025-03-25 RX ADMIN — CELECOXIB 100 MG: 100 CAPSULE ORAL at 20:29

## 2025-03-25 RX ADMIN — ACETAMINOPHEN 1000 MG: 500 TABLET, FILM COATED ORAL at 08:50

## 2025-03-25 RX ADMIN — AMITRIPTYLINE HYDROCHLORIDE 25 MG: 25 TABLET, FILM COATED ORAL at 18:19

## 2025-03-25 RX ADMIN — TEMAZEPAM 15 MG: 15 CAPSULE ORAL at 20:29

## 2025-03-25 RX ADMIN — OXYCODONE HYDROCHLORIDE 5 MG: 5 TABLET ORAL at 14:02

## 2025-03-25 RX ADMIN — ACETAMINOPHEN 1000 MG: 500 TABLET, FILM COATED ORAL at 16:38

## 2025-03-25 RX ADMIN — HYDROXYZINE HYDROCHLORIDE 25 MG: 25 TABLET ORAL at 12:22

## 2025-03-25 RX ADMIN — CEFAZOLIN 1 G: 1 INJECTION, POWDER, FOR SOLUTION INTRAMUSCULAR; INTRAVENOUS at 05:14

## 2025-03-25 ASSESSMENT — ACTIVITIES OF DAILY LIVING (ADL)
ADLS_ACUITY_SCORE: 32
ADLS_ACUITY_SCORE: 32
ADLS_ACUITY_SCORE: 34
ADLS_ACUITY_SCORE: 32
ADLS_ACUITY_SCORE: 37
ADLS_ACUITY_SCORE: 34
ADLS_ACUITY_SCORE: 37
ADLS_ACUITY_SCORE: 32
ADLS_ACUITY_SCORE: 34
ADLS_ACUITY_SCORE: 34
ADLS_ACUITY_SCORE: 37
ADLS_ACUITY_SCORE: 32
ADLS_ACUITY_SCORE: 37
ADLS_ACUITY_SCORE: 32
ADLS_ACUITY_SCORE: 32
ADLS_ACUITY_SCORE: 34
ADLS_ACUITY_SCORE: 32
ADLS_ACUITY_SCORE: 37
ADLS_ACUITY_SCORE: 34
ADLS_ACUITY_SCORE: 32

## 2025-03-25 NOTE — PLAN OF CARE
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 Dressing- CDI. Contact precaution maintained.

## 2025-03-25 NOTE — PROGRESS NOTES
3/24/2025 [id number]    DX: R TKA  POD: 1  Mental Status: Alert and oriented x 4  Activity: Assist of 1 with GB/walker to BR  Diet: Regular  Pain: minimal 2/10, scheduled Tylenol  Marrero/Voiding: Continent, bathroom  O2/LDA: Room air, PIV saline locked  Discharge:today?  Other Info: Contact precautions                     Baseline neuropathy amanda LE                      Dressing CDI

## 2025-03-25 NOTE — PROGRESS NOTES
[facility]  Orthopedic Progress Note    Chief Complaint:  POD#1 s/p right total knee arthroplasty         Interval History:     Patient is doing well overall with minimal pain. Up with PT and ambulating yesterday. No cp, sob, n/v/d, or abd pain.              Medications:     Current Facility-Administered Medications   Medication Dose Route Frequency Provider Last Rate Last Admin    acetaminophen (TYLENOL) tablet 1,000 mg  1,000 mg Oral Q8H Nadiya Bliss PA-C   1,000 mg at 03/25/25 0850    amitriptyline (ELAVIL) tablet 25 mg  25 mg Oral At Bedtime Nadiya Bliss PA-C   25 mg at 03/24/25 1819    benzocaine-menthol (CHLORASEPTIC) 6-10 MG lozenge 1 lozenge  1 lozenge Buccal Q1H PRN Nadiya Bliss PA-C        bisacodyl (DULCOLAX) suppository 10 mg  10 mg Rectal Daily PRN Nadiya Bliss PA-C        celecoxib (celeBREX) capsule 100 mg  100 mg Oral BID Nadiya Blsis PA-C   100 mg at 03/25/25 0850    HYDROmorphone (DILAUDID) injection 0.1 mg  0.1 mg Intravenous Q4H PRN Nadiya Bliss PA-C        Or    HYDROmorphone (DILAUDID) injection 0.2 mg  0.2 mg Intravenous Q4H PRN Nadiya Bliss PA-C        hydrOXYzine HCl (ATARAX) tablet 25 mg  25 mg Oral Q6H PRN Nadiya Bliss PA-C        lactated ringers infusion   Intravenous Continuous Nadiya Bliss PA-C 100 mL/hr at 03/25/25 0227 New Bag at 03/25/25 0227    lidocaine (LMX4) cream   Topical Q1H PRN Nadiya Bliss PA-C        lidocaine 1 % 0.1-1 mL  0.1-1 mL Other Q1H PRN Nadiya Bliss PA-C        lisinopril (ZESTRIL) tablet 10 mg  10 mg Oral Daily Nadiya Bliss PA-C   10 mg at 03/25/25 0850    magnesium hydroxide (MILK OF MAGNESIA) suspension 30 mL  30 mL Oral Daily PRN Nadiya Bliss PA-C        metoprolol succinate ER (TOPROL XL) 24 hr tablet 100 mg  100 mg Oral Daily Nadiya Bliss PA-C   100 mg at 03/25/25 0850    naloxone (NARCAN) injection 0.2 mg  0.2 mg Intravenous Q2 Min PRN Fer Montero MD        Or    naloxone (NARCAN) injection 0.4 mg  0.4 mg  "Intravenous Q2 Min PRN Fer Montero MD        Or    naloxone (NARCAN) injection 0.2 mg  0.2 mg Intramuscular Q2 Min PRN Fer Montero MD        Or    naloxone (NARCAN) injection 0.4 mg  0.4 mg Intramuscular Q2 Min PRN Fer Montero MD        ondansetron (ZOFRAN ODT) ODT tab 4 mg  4 mg Oral Q6H PRN Nadiya Bliss PA-C        Or    ondansetron (ZOFRAN) injection 4 mg  4 mg Intravenous Q6H PRN Nadiya Bliss PA-C        oxyCODONE IR (ROXICODONE) half-tab 2.5 mg  2.5 mg Oral Q4H PRN Nadiya Bliss PA-C   2.5 mg at 25    Or    oxyCODONE (ROXICODONE) tablet 5 mg  5 mg Oral Q4H PRN Nadiya Bliss PA-C        polyethylene glycol (MIRALAX) Packet 17 g  17 g Oral Daily Nadiya Bliss PA-C   17 g at 25 0849    prochlorperazine (COMPAZINE) injection 5 mg  5 mg Intravenous Q6H PRN Nadiya Bliss PA-C        Or    prochlorperazine (COMPAZINE) tablet 5 mg  5 mg Oral Q6H PRN Nadiya Bliss PA-C        rivaroxaban ANTICOAGULANT (XARELTO) tablet 10 mg  10 mg Oral Daily with breakfast Nadiya Bliss PA-C   10 mg at 25 0851    senna-docusate (SENOKOT-S/PERICOLACE) 8.6-50 MG per tablet 1 tablet  1 tablet Oral BID Nadiya Bliss PA-C   1 tablet at 25 0850    sodium chloride (PF) 0.9% PF flush 3 mL  3 mL Intracatheter Q8H Nadiya Bliss PA-C        sodium chloride (PF) 0.9% PF flush 3 mL  3 mL Intracatheter q1 min prn Nadiya Bliss PA-C        temazepam (RESTORIL) capsule 15 mg  15 mg Oral At Bedtime Nadiya Bliss PA-C   15 mg at 25                  Physical Exam:   Blood pressure (!) 159/83, pulse 59, temperature 98.3  F (36.8  C), temperature source Oral, resp. rate 19, height 1.626 m (5' 4\"), weight 65.3 kg (144 lb), SpO2 98%.      Vital Sign Ranges  Temperature Temp  Av.6  F (36.4  C)  Min: 96.6  F (35.9  C)  Max: 98.5  F (36.9  C)   Blood pressure Systolic (24hrs), Av , Min:117 , Max:181        Diastolic (24hrs), Av, Min:80, Max:105      Pulse Pulse  Av.7  " Min: 56  Max: 93   Respirations Resp  Av.3  Min: 10  Max: 27   Pulse oximetry SpO2  Av.1 %  Min: 95 %  Max: 99 %         Intake/Output Summary (Last 24 hours) at 3/25/2025 0907  Last data filed at 3/25/2025 0047  Gross per 24 hour   Intake 2028 ml   Output 450 ml   Net 1578 ml       AA&Ox3, NAD  Non-labored breathing  DP and PT pulses 2+  Right knee with dressing c/d/i  Calves soft and nontender  EHL and FHL intact  NVI distally, SILT               Data:   Hgb: pending           Assessment and Plan:         Sixto Jimenez is a 80 year old female POD#1 s/p right total knee arthroplasty with Dr. Montero    -- Pain well controlled with current regimen  -- DVT ppx: xarelto x14 days  -- Bowel ppx in place, regular diet as tolerated  -- PT to continue per protocol  -- Dispo: home today with already scheduled follow up in 2 weeks at [facility]    Nadiya Bliss PA-C  m:[phone]

## 2025-03-25 NOTE — PROGRESS NOTES
Patient vital signs are at baseline: Yes  Patient able to ambulate as they were prior to admission or with assist devices provided by therapies during their stay:  Yes  Patient MUST void prior to discharge:  Yes  Patient able to tolerate oral intake:  Yes  Pain has adequate pain control using Oral analgesics:  Yes  Does patient have an identified :  Yes  Has goal D/C date and time been discussed with patient:  Yes    Pt A&Ox4. VSS on RA. Reg diet. Up 1A GB&W, voiding in BR. R knee dressing CDI, baseline neuropathy BLE toes. Pain controlled w/ scheduled Tylenol/Celebrex, & PRN Oxycodone/Atarax. R PIV SL. Pt feeling dizzy/lightheaded & positive orthostatics in AM PT session LR Bolus given. Pt still feeling dizzy following OT session after brief ambulation. Ortho PA notified, writer LVM and awaiting callback. PT evel pending. Possible discharge home tomorrow pending medical stability, Continue plan of Care.

## 2025-03-25 NOTE — PROVIDER NOTIFICATION
Paged Ortho PA Nadiya regarding patient having positive orthostatics and getting dizzy/lightheaded upon standing with PT. Received order for LR bolus over 2 hours and to reevaluate pt following bolus. PT/OT to come assess this afternoon. Continue plan of Care.  
Writer LVM for Ortho PA regarding pt unable to pass PT/OT and needing to stay another night & request for increased senna. Awaiting callback. Continue plan of Care.  
Writer received call from Ortho JOSE Hearn informing writer to give LR IVF 100cc/hr for 1 1000cc bag, received orders for Senna BID 2 tabs PRN. Updated PA that patient will be staying til tomorrow 03/26 d/t not passing PT. Continue plan of Care.  
Detail Level: Simple
Plan: Apply hydrocortisone to nasolabial folds as needed for flares

## 2025-03-25 NOTE — PROGRESS NOTES
.Patient vital signs are at baseline: Yes  Patient able to ambulate as they were prior to admission or with assist devices provided by therapies during their stay:  No,  Reason:  Unable to walk much d/t patient c/o felling dizzy  Patient MUST void prior to discharge:  Yes  Patient able to tolerate oral intake:  Yes  Pain has adequate pain control using Oral analgesics:  Yes  Does patient have an identified :  Yes  Has goal D/C date and time been discussed with patient:  Yes    [id number] Patient A&OX4, VSS on RA. Dressing CDI. Baseline BLE neuropathy. Pain managed with Tylenol, Oxy & atarax. Iv infusing 100ml.hr. Regular diet. Tolerated well.

## 2025-03-25 NOTE — DISCHARGE SUMMARY
"Discharge Summary    Sixto Jimenez MRN# [id number]   YOB: 1945 Age: 80 year old     Date of Admission: 3/24/2025    Date of Discharge: 3/25/2025    Reason for Admission: Sixto Jimenez is an 80 year old female who was admitted to the hospital following surgery.    Preoperative Diagnosis: Osteoarthrosis of knee [M17.9]  Osteoarthritis of right knee [M17.11]    Postoperative Diagnosis: Osteoarthrosis of knee [M17.9]  Osteoarthritis of right knee [M17.11]    Procedure Completed:  right total knee arthroplasty    Hospital Course:  Ms. Jimenez was admitted and underwent the above procedure. The patient tolerated the procedure well. There were no complications. Following surgery she was admitted to the floor.  During her stay she did not require any blood transfusions. Her pain was controlled with oral pain medication.  During her stay she progressed well in physical therapy and all the therapy goals were met.     Discharge Physical Exam:  BP (!) 159/83 (BP Location: Right arm)   Pulse 59   Temp 98.3  F (36.8  C) (Oral)   Resp 19   Ht 1.626 m (5' 4\")   Wt 65.3 kg (144 lb)   SpO2 98%   BMI 24.72 kg/m    Neurovascularly intact, distal pulses present bilaterally.  Calves are negative bilaterally, both soft and nontender.    Assessment: Ms. Jimenez is stable and doing well status post right total knee arthroplasty.    Plan: We will discharge her home on analgesics and deep venous thrombosis prophylaxis. Patient advised to begin PT in 3-7 days. She will follow-up with me approximately 2 weeks from surgery. This appointment is already scheduled at [facility]. She may call [phone] with additional questions or concerns.    Meds:     Medication List        Started      celecoxib 200 MG capsule  Commonly known as: celeBREX  200 mg, Oral, DAILY, Do not take within 6 hours of ibuprofen (MOTRIN, ADVIL) or ketorolac (TORADOL) if prescribed.     hydrOXYzine HCl 25 MG tablet  Commonly known as: ATARAX  25 mg, Oral, EVERY " 6 HOURS PRN     rivaroxaban ANTICOAGULANT 10 MG Tabs tablet  Commonly known as: XARELTO  10 mg, Oral, DAILY WITH BREAKFAST            Modified      acetaminophen 325 MG tablet  Commonly known as: TYLENOL  975 mg, Oral, EVERY 8 HOURS PRN  What changed:   when to take this  reasons to take this     HYDROcodone-acetaminophen 5-325 MG tablet  Commonly known as: NORCO  1-2 tablets, Oral, EVERY 4 HOURS PRN  What changed:   how much to take  when to take this  reasons to take this     senna-docusate 8.6-50 MG tablet  Commonly known as: SENOKOT-S/PERICOLACE  1-2 tablets, Oral, 2 TIMES DAILY, Take while on oral narcotics to prevent or treat constipation.  What changed:   how much to take  when to take this  additional instructions

## 2025-03-25 NOTE — PROGRESS NOTES
"   03/25/25 1407   Appointment Info   Signing Clinician's Name / Credentials (OT) Loren Martin, OTR/L   Living Environment   People in Home alone   Current Living Arrangements condominium   Home Accessibility stairs to enter home   Number of Stairs, Main Entrance 1   Transportation Anticipated family or friend will provide   Living Environment Comments Pt lives alone in [facility], [address]. Pt report walk in shower w/ grab bar, built in chair. Pt has counter next to toilet. Pt has FWW from previous hip surgery.   Self-Care   Usual Activity Tolerance good   Current Activity Tolerance moderate   Equipment Currently Used at Home dressing device;walker, standard;grab bar, tub/shower;shower chair   Fall history within last six months no   Activity/Exercise/Self-Care Comment Pt ind in ADLs at baseline. Pt ind in dressing, showering, toileting at baseline. Pt has FWW from previos hip surgery but does not use at baseline. Pt reporting hired in home care through agency, will stay overnight if needed following DC and provide ADL assist and supervision as needed. Pt used to walk up to 3 miles/day but not anymore d/t knee pain   Instrumental Activities of Daily Living (IADL)   Previous Responsibilities driving;laundry;meal prep;shopping;medication management   IADL Comments Pt reports ind in driving, laundry, med mgmt, shopping at baseline. Laundry located inside condo. Pt recently retired in Sept 2024. Pt has hired help for cleaning.   General Information   Onset of Illness/Injury or Date of Surgery 03/24/25   Referring Physician Nadiya Bliss PA-C   Patient/Family Therapy Goal Statement (OT) to return home   Additional Occupational Profile Info/Pertinent History of Current Problem Per chart review, \"80 y.o. female s/p R TKA on 3/24/2025, POD#1\"   Existing Precautions/Restrictions fall   Right Lower Extremity (Weight-bearing Status) weight-bearing as tolerated (WBAT)   Cognitive Status Examination   Orientation Status " orientation to person, place and time   Affect/Mental Status (Cognitive) WFL;anxious   Follows Commands WFL   Cognitive Status Comments Pt oriented to person, situation, place, day, month, year..   Visual Perception   Visual Impairment/Limitations corrective lenses for reading   Sensory   Sensory Comments baseline neuropathy in BLE, BUE   Pain Assessment   Patient Currently in Pain Yes, see Vital Sign flowsheet  (8/10 pain OOB)   Range of Motion Comprehensive   Comment, General Range of Motion WFL, ROM as tolerated RLE   Strength Comprehensive (MMT)   Comment, General Manual Muscle Testing (MMT) Assessment WFL   Bed Mobility   Bed Mobility supine-sit;sit-supine;scooting/bridging   Scooting/Bridging [place] (Bed Mobility) supervision   Supine-Sit [place] (Bed Mobility) supervision   Sit-Supine [place] (Bed Mobility) supervision   Transfers   Transfers toilet transfer;sit-stand transfer   Sit-Stand Transfer   Sit-Stand [place] (Transfers) supervision   Assistive Device (Sit-Stand Transfers) walker, front-wheeled   Toilet Transfer   Type (Toilet Transfer) sit-stand   [place] Level (Toilet Transfer) supervision   Assistive Device (Toilet Transfer) raised toilet seat;grab bars/safety frame   Balance   Balance Comments Not formally assessed, no LOB noted during session.   Activities of Daily Living   BADL Assessment/Intervention upper body dressing;lower body dressing;toileting   Upper Body Dressing Assessment/Training   Position (Upper Body Dressing) long sitting   [place] Level (Upper Body Dressing) supervision;set up   Lower Body Dressing Assessment/Training   Position (Lower Body Dressing) edge of bed sitting   Assistive Devices (Lower Body Dressing) reacher;sock-aid   [place] Level (Lower Body Dressing) modified independence;set up   Toileting   Assistive Devices (Toileting) raised toilet seat;grab bar, toilet   [place] Level (Toileting) supervision   Clinical Impression    Criteria for Skilled Therapeutic Interventions Met (OT) Yes, treatment indicated   OT Diagnosis Impaired I/ADL independence   OT Problem List-Impairments impacting ADL problems related to;activity tolerance impaired;pain;strength;mobility   Assessment of Occupational Performance 3-5 Performance Deficits   Identified Performance Deficits LB dressing, dec activity tolerance, UB dressing, IADL ind   Planned Therapy Interventions (OT) risk factor education;transfer training;strengthening;ADL retraining;IADL retraining   Clinical Decision Making Complexity (OT) detailed assessment/moderate complexity   Risk & Benefits of therapy have been explained evaluation/treatment results reviewed;care plan/treatment goals reviewed;risks/benefits reviewed;current/potential barriers reviewed;participants voiced agreement with care plan;patient;participants included   OT Total Evaluation Time   OT Eval, Moderate Complexity Minutes (29356) 15   OT Goals   Therapy Frequency (OT) 5 times/week   OT Predicted Duration/Target Date for Goal Attainment 04/01/25   OT Goals Upper Body Dressing;Lower Body Dressing;Transfers;Toilet Transfer/Toileting   OT: Upper Body Dressing Independent;including set-up/clothing retrieval   OT: Lower Body Dressing Modified independent;including set-up/clothing retrieval   OT: Transfer Independent;with assistive device   OT: Toilet Transfer/Toileting Modified independent;toilet transfer;cleaning and garment management   Interventions   Interventions Quick Adds Self-Care/Home Management   Self-Care/Home Management   Self-Care/Home Mgmt/ADL, Compensatory, Meal Prep Minutes (00463) 35   Symptoms Noted During/After Treatment (Meal Preparation/Planning Training) dizziness;increased pain   Treatment Detail/Skilled Intervention Pt greeted on toilet w/ nursing asst in room, agreeable to OT. Pt STS SBA using grab bar and FWW, reporting dizziness. Pt amb w/ FWW and transfer to sit EOB SBA. Pt don underwear sitting EOB  SBA. Pt reporting needing to lay down d/t dizziness, pt appearing pale. Pt sit<>supine SBA. BP taken, 150/86. Pt reporting ind w/ pericares for toileting. Pt reporting dizziness resolved while supine. Pt asking about DC rec, edu on having 24 supervision and assist for I/ADLs as needed upon DC d/t dizziness for inc safety and ind. Pt verbalized understanding, reporting hired home health to stay overnight for 24-48 hours upon DC. Pt supine<>sit EOB SBA, reporting inc pain in RLE but no dizziness. Pt doff/don L sock using seated fig 4 method SBA. Pt unable to doff/don R sock sitting EOB, able to complete long sitting in bed SBA. Pt edu on AD for LB dressing (sock aide, reacher). Pt doff R sock using reacher sitting EOB SBA. Pt don R sock SBA w/ sock aide sitting EOB. Pt don/doff pants sitting EOB using reacher SBA w/ [employer]. Pt edu on purchasing options for sock aide, reporting already owns reacher. Pt in bed at end of session, bed alarm on, all needs within reach. RN updated.   OT Discharge Planning   OT Plan monitor BP, inc activity tolerance, progress to standing g/h as tolerated   OT Discharge Recommendation (DC Rec) other (see comments)  (defer to ortho)   OT Rationale for DC Rec Pt currently functioning below baseline, limited by dec activity tolerance, pain, and dizziness related to R TKA. Pt ind in ADLs at baseline, lives alone in [facility]. Pt reporting hired help to provide 24/7 supervision and I/ADL assist upon DC d/t inc dizziness. Pt reports dtr able to assist occassionally. Rec home w/ assist for I/ADLs and inc supervision for ind and safety upon DC. Will continue IP OT to progress activity tolerance for I/ADL ind and safety.   OT Brief overview of current status Goals of therapy will be to address safe mobility and make recs for d/c to next level of care. Pt and RN will continue to follow all falls risk precautions as documented by RN staff while hospitalized. Ax1   OT Total Distance Amb During Session  (feet) 10   Total Session Time   Timed Code Treatment Minutes 35   Total Session Time (sum of timed and untimed services) 50

## 2025-03-25 NOTE — UTILIZATION REVIEW
Inpatient appropriate      Admission Status; Secondary Review Determination          Under the authority of the Utilization Management Committee, the utilization review process indicated a secondary review on the above patient. The review outcome is based on review of the medical records, discussions with staff, and applying clinical experience noted on the date of the review.       (x) Inpatient Status Appropriate - This patient's medical care is consistent with medical management for inpatient care and reasonable inpatient medical practice.       RATIONALE FOR DETERMINATION       Patient is a 80-year-old female with past medical history of fibromyalgia, hypertension, hyperlipidemia, mitral valve prolapse, chronic pain who presented for a right total knee arthroplasty that was completed on 3/24.  Unfortunately, on 3/25 the patient developed dizziness and lightheadedness while working with physical therapy.  Orthostatic vital signs were positive with blood pressure dropping from 124/64 to 106/61.  The patient was given IV fluid bolus.  Anticipate she will need greater than 2 midnights in the hospital as she will need formal physical therapy and Occupational Therapy evaluation and be able to participate with them for safe discharge.  This patient is inpatient appropriate.     At the time of admission with the information available to the attending physician more than 2 nights Hospital complex care was anticipated, based on patient risk of adverse outcome if treated as outpatient and complex care required. Inpatient admission is appropriate based on the Medicare guidelines.       This document was produced using voice recognition software          The information on this document is developed by the utilization review team in order for the business office to ensure compliance. This only denotes the appropriateness of proper admission status and does not reflect the quality of care rendered.    The definitions of  Inpatient Status and Observation Status used in making the determination above are those provided in the CMS Coverage Manual, Chapter 1 and Chapter 6, section 70.4.      Sincerely,      Matthew Sotelo, DO  Utilization Review   Physician Advisor

## 2025-03-26 ENCOUNTER — APPOINTMENT (OUTPATIENT)
Dept: PHYSICAL THERAPY | Facility: CLINIC | Age: 80
DRG: 470 | End: 2025-03-26
Attending: ORTHOPAEDIC SURGERY
Payer: MEDICARE

## 2025-03-26 ENCOUNTER — APPOINTMENT (OUTPATIENT)
Dept: OCCUPATIONAL THERAPY | Facility: CLINIC | Age: 80
DRG: 470 | End: 2025-03-26
Attending: ORTHOPAEDIC SURGERY
Payer: MEDICARE

## 2025-03-26 VITALS
HEIGHT: 64 IN | DIASTOLIC BLOOD PRESSURE: 70 MMHG | WEIGHT: 144 LBS | OXYGEN SATURATION: 95 % | TEMPERATURE: 97.8 F | RESPIRATION RATE: 18 BRPM | HEART RATE: 65 BPM | BODY MASS INDEX: 24.59 KG/M2 | SYSTOLIC BLOOD PRESSURE: 151 MMHG

## 2025-03-26 LAB
GLUCOSE SERPL-MCNC: 121 MG/DL (ref 70–99)
HGB BLD-MCNC: 9.6 G/DL (ref 11.7–15.7)

## 2025-03-26 PROCEDURE — 97116 GAIT TRAINING THERAPY: CPT | Mod: GP | Performed by: PHYSICAL THERAPY ASSISTANT

## 2025-03-26 PROCEDURE — 97110 THERAPEUTIC EXERCISES: CPT | Mod: GP | Performed by: PHYSICAL THERAPY ASSISTANT

## 2025-03-26 PROCEDURE — 36415 COLL VENOUS BLD VENIPUNCTURE: CPT | Performed by: ORTHOPAEDIC SURGERY

## 2025-03-26 PROCEDURE — 82947 ASSAY GLUCOSE BLOOD QUANT: CPT | Performed by: ORTHOPAEDIC SURGERY

## 2025-03-26 PROCEDURE — 120N000013 HC R&B IMCU

## 2025-03-26 PROCEDURE — 85018 HEMOGLOBIN: CPT | Performed by: PHYSICIAN ASSISTANT

## 2025-03-26 PROCEDURE — 250N000013 HC RX MED GY IP 250 OP 250 PS 637: Performed by: PHYSICIAN ASSISTANT

## 2025-03-26 PROCEDURE — 97535 SELF CARE MNGMENT TRAINING: CPT | Mod: GO

## 2025-03-26 PROCEDURE — 97530 THERAPEUTIC ACTIVITIES: CPT | Mod: GP | Performed by: PHYSICAL THERAPY ASSISTANT

## 2025-03-26 RX ORDER — OXYCODONE HYDROCHLORIDE 5 MG/1
5 TABLET ORAL EVERY 4 HOURS PRN
Qty: 15 TABLET | Refills: 0 | Status: SHIPPED | OUTPATIENT
Start: 2025-03-26 | End: 2025-03-29

## 2025-03-26 RX ADMIN — HYDROXYZINE HYDROCHLORIDE 25 MG: 25 TABLET ORAL at 00:31

## 2025-03-26 RX ADMIN — ACETAMINOPHEN 1000 MG: 500 TABLET, FILM COATED ORAL at 08:09

## 2025-03-26 RX ADMIN — METOPROLOL SUCCINATE 100 MG: 50 TABLET, EXTENDED RELEASE ORAL at 08:09

## 2025-03-26 RX ADMIN — OXYCODONE HYDROCHLORIDE 5 MG: 5 TABLET ORAL at 13:11

## 2025-03-26 RX ADMIN — OXYCODONE HYDROCHLORIDE 5 MG: 5 TABLET ORAL at 03:34

## 2025-03-26 RX ADMIN — LISINOPRIL 10 MG: 10 TABLET ORAL at 08:10

## 2025-03-26 RX ADMIN — ACETAMINOPHEN 1000 MG: 500 TABLET, FILM COATED ORAL at 00:31

## 2025-03-26 RX ADMIN — RIVAROXABAN 10 MG: 10 TABLET, FILM COATED ORAL at 08:09

## 2025-03-26 RX ADMIN — SENNOSIDES AND DOCUSATE SODIUM 1 TABLET: 50; 8.6 TABLET ORAL at 08:09

## 2025-03-26 RX ADMIN — CELECOXIB 100 MG: 100 CAPSULE ORAL at 08:09

## 2025-03-26 RX ADMIN — HYDROXYZINE HYDROCHLORIDE 25 MG: 25 TABLET ORAL at 10:22

## 2025-03-26 RX ADMIN — POLYETHYLENE GLYCOL 3350 17 G: 17 POWDER, FOR SOLUTION ORAL at 08:09

## 2025-03-26 RX ADMIN — OXYCODONE HYDROCHLORIDE 5 MG: 5 TABLET ORAL at 08:10

## 2025-03-26 ASSESSMENT — ACTIVITIES OF DAILY LIVING (ADL)
ADLS_ACUITY_SCORE: 34
ADLS_ACUITY_SCORE: 37
ADLS_ACUITY_SCORE: 34
ADLS_ACUITY_SCORE: 37
ADLS_ACUITY_SCORE: 34
ADLS_ACUITY_SCORE: 37
ADLS_ACUITY_SCORE: 37
ADLS_ACUITY_SCORE: 34
ADLS_ACUITY_SCORE: 37

## 2025-03-26 NOTE — DISCHARGE SUMMARY
Discharge    Patient discharged to home via car with friend  Care plan note: MET    Listed belongings gathered and given to patient (including from security/pharmacy). Yes  Care Plan and Patient education resolved: Yes  Prescriptions if needed, hard copies sent with patient  Yes  Medication Bin checked and emptied on discharge Yes  SW/care coordinator/charge RN aware of discharge: Yes

## 2025-03-26 NOTE — PROGRESS NOTES
[facility]  Orthopedic Progress Note    Chief Complaint:  POD#2 s/p right total knee arthroplasty         Interval History:     Patient had dizziness and lightheadedness when getting up with PT yesterday. Feeling well this morning in bed. Has not gotten up overnight. No cp, sob, n/v/d, or abd pain.              Medications:     Current Facility-Administered Medications   Medication Dose Route Frequency Provider Last Rate Last Admin    acetaminophen (TYLENOL) tablet 1,000 mg  1,000 mg Oral Q8H Nadiya Bliss PA-C   1,000 mg at 03/26/25 0031    amitriptyline (ELAVIL) tablet 25 mg  25 mg Oral At Bedtime Nadiya Bliss PA-C   25 mg at 03/25/25 1819    benzocaine-menthol (CHLORASEPTIC) 6-10 MG lozenge 1 lozenge  1 lozenge Buccal Q1H PRN Nadiya Bliss PA-C        bisacodyl (DULCOLAX) suppository 10 mg  10 mg Rectal Daily PRN Nadiya Bliss PA-C        celecoxib (celeBREX) capsule 100 mg  100 mg Oral BID Nadiya Bliss PA-C   100 mg at 03/25/25 2029    HYDROmorphone (DILAUDID) injection 0.1 mg  0.1 mg Intravenous Q4H PRN Nadiya Bliss PA-C        Or    HYDROmorphone (DILAUDID) injection 0.2 mg  0.2 mg Intravenous Q4H PRN Nadiya Bliss PA-C        hydrOXYzine HCl (ATARAX) tablet 25 mg  25 mg Oral Q6H PRN Nadiya Bliss PA-C   25 mg at 03/26/25 0031    lactated ringers infusion   Intravenous Continuous Nadiya Bliss PA-C 100 mL/hr at 03/25/25 0227 New Bag at 03/25/25 0227    lidocaine (LMX4) cream   Topical Q1H PRN Nadiya Bliss PA-C        lidocaine 1 % 0.1-1 mL  0.1-1 mL Other Q1H PRN Nadiya Bliss PA-C        lisinopril (ZESTRIL) tablet 10 mg  10 mg Oral Daily Nadiya Bliss PA-C   10 mg at 03/25/25 0850    magnesium hydroxide (MILK OF MAGNESIA) suspension 30 mL  30 mL Oral Daily PRN Nadiya Bliss PA-C        metoprolol succinate ER (TOPROL XL) 24 hr tablet 100 mg  100 mg Oral Daily Nadiya Bliss PA-C   100 mg at 03/25/25 0850    naloxone (NARCAN) injection 0.2 mg  0.2 mg Intravenous Q2 Min PRN  "Fer Montero MD        Or    naloxone (NARCAN) injection 0.4 mg  0.4 mg Intravenous Q2 Min PRN Fer Montero MD        Or    naloxone (NARCAN) injection 0.2 mg  0.2 mg Intramuscular Q2 Min PRN Fer Montero MD        Or    naloxone (NARCAN) injection 0.4 mg  0.4 mg Intramuscular Q2 Min PRN Fer Montero MD        ondansetron (ZOFRAN ODT) ODT tab 4 mg  4 mg Oral Q6H PRN Nadiya lBiss PA-C        Or    ondansetron (ZOFRAN) injection 4 mg  4 mg Intravenous Q6H PRN Nadiya Bliss PA-C        oxyCODONE IR (ROXICODONE) half-tab 2.5 mg  2.5 mg Oral Q4H PRN Nadiya Bliss PA-C   2.5 mg at 03/25/25 1038    Or    oxyCODONE (ROXICODONE) tablet 5 mg  5 mg Oral Q4H PRN Nadiya Bliss PA-C   5 mg at 03/26/25 0334    polyethylene glycol (MIRALAX) Packet 17 g  17 g Oral Daily Nadiya Bliss PA-C   17 g at 03/25/25 0849    prochlorperazine (COMPAZINE) injection 5 mg  5 mg Intravenous Q6H PRN Nadiya Bliss PA-C        Or    prochlorperazine (COMPAZINE) tablet 5 mg  5 mg Oral Q6H PRN Nadiya Bliss PA-C        rivaroxaban ANTICOAGULANT (XARELTO) tablet 10 mg  10 mg Oral Daily with breakfast Nadiya Bliss PA-C   10 mg at 03/25/25 0851    senna-docusate (SENOKOT-S/PERICOLACE) 8.6-50 MG per tablet 1 tablet  1 tablet Oral BID Nadiya Bliss PA-C   1 tablet at 03/25/25 2028    senna-docusate (SENOKOT-S/PERICOLACE) 8.6-50 MG per tablet 2 tablet  2 tablet Oral BID PRN Nadiya Bliss PA-C        sodium chloride (PF) 0.9% PF flush 3 mL  3 mL Intracatheter Q8H Nadiya Bliss PA-C        sodium chloride (PF) 0.9% PF flush 3 mL  3 mL Intracatheter q1 min prn Nadiya Bliss PA-C        temazepam (RESTORIL) capsule 15 mg  15 mg Oral At Bedtime Nadiya Bliss PA-C   15 mg at 03/25/25 2029                  Physical Exam:   Blood pressure 138/72, pulse 67, temperature 98  F (36.7  C), temperature source Axillary, resp. rate 16, height 1.626 m (5' 4\"), weight 65.3 kg (144 lb), SpO2 94%.      Vital Sign Ranges  Temperature Temp  " Av.6  F (36.4  C)  Min: 96.6  F (35.9  C)  Max: 98.5  F (36.9  C)   Blood pressure Systolic (24hrs), Av , Min:117 , Max:181        Diastolic (24hrs), Av, Min:80, Max:105      Pulse Pulse  Av.7  Min: 56  Max: 93   Respirations Resp  Av.3  Min: 10  Max: 27   Pulse oximetry SpO2  Av.1 %  Min: 95 %  Max: 99 %         Intake/Output Summary (Last 24 hours) at 3/25/2025 0907  Last data filed at 3/25/2025 0047  Gross per 24 hour   Intake 2028 ml   Output 450 ml   Net 1578 ml       AA&Ox3, NAD  Non-labored breathing  DP and PT pulses 2+  Right knee with dressing c/d/i  Calves soft and nontender  EHL and FHL intact  NVI distally, SILT               Data:   Hgb: 10.7           Assessment and Plan:         Sixto Jimenez is a 80 year old female POD#2 s/p right total knee arthroplasty with Dr. Montero    -- Dizziness and lightheadedness: occurred yesterday while getting up with PT/OT. Will monitor today.  -- Pain well controlled with current regimen  -- DVT ppx: xarelto x14 days  -- Bowel ppx in place, regular diet as tolerated  -- PT to continue per protocol  -- Dispo: plan to discharge today as long as no dizziness or lightheadedness    Nadiya Bliss PA-C  m:[phone]

## 2025-03-26 NOTE — DISCHARGE SUMMARY
"Discharge Summary    Sixto Jimenez MRN# [id number]   YOB: 1945 Age: 80 year old     Date of Admission: 3/24/2025    Date of Discharge: 3/26/2025    Reason for Admission: Sixto Jimenez is an 80 year old female who was admitted to the hospital following surgery.    Preoperative Diagnosis: Osteoarthrosis of knee [M17.9]  Osteoarthritis of right knee [M17.11]    Postoperative Diagnosis: Osteoarthrosis of knee [M17.9]  Osteoarthritis of right knee [M17.11]    Procedure Completed:  right total knee arthroplasty    Hospital Course:  Ms. Jimenez was admitted and underwent the above procedure. The patient tolerated the procedure well. There were no complications. Following surgery she was admitted to the floor.  During her stay she did not require any blood transfusions. Her pain was controlled with oral pain medication.      Discharge Physical Exam:  /72 (BP Location: Right arm)   Pulse 67   Temp 98  F (36.7  C) (Axillary)   Resp 16   Ht 1.626 m (5' 4\")   Wt 65.3 kg (144 lb)   SpO2 94%   BMI 24.72 kg/m    Neurovascularly intact, distal pulses present bilaterally.  Calves are negative bilaterally, both soft and nontender.    Assessment: Ms. Jimenez is stable and doing well status post right total knee arthroplasty. Lightheadedness and dizziness yesterday with PT/OT kept her in hospital.    Plan: We will discharge her home on analgesics and deep venous thrombosis prophylaxis. Patient advised to begin PT in 3-7 days. She will follow-up with me approximately 2 weeks from surgery. This appointment is already scheduled at [facility]. She may call [phone] with additional questions or concerns.    Meds:     Medication List        Started      celecoxib 200 MG capsule  Commonly known as: celeBREX  200 mg, Oral, DAILY, Do not take within 6 hours of ibuprofen (MOTRIN, ADVIL) or ketorolac (TORADOL) if prescribed.     hydrOXYzine HCl 25 MG tablet  Commonly known as: ATARAX  25 mg, Oral, EVERY 6 HOURS PRN   "   oxyCODONE 5 MG tablet  Commonly known as: ROXICODONE  5 mg, Oral, EVERY 4 HOURS PRN     rivaroxaban ANTICOAGULANT 10 MG Tabs tablet  Commonly known as: XARELTO  10 mg, Oral, DAILY WITH BREAKFAST            Modified      acetaminophen 325 MG tablet  Commonly known as: TYLENOL  975 mg, Oral, EVERY 8 HOURS PRN  What changed:   when to take this  reasons to take this     HYDROcodone-acetaminophen 5-325 MG tablet  Commonly known as: NORCO  1-2 tablets, Oral, EVERY 4 HOURS PRN  What changed:   how much to take  when to take this  reasons to take this     senna-docusate 8.6-50 MG tablet  Commonly known as: SENOKOT-S/PERICOLACE  1-2 tablets, Oral, 2 TIMES DAILY, Take while on oral narcotics to prevent or treat constipation.  What changed:   how much to take  when to take this  additional instructions

## 2025-03-26 NOTE — PLAN OF CARE
Occupational Therapy Discharge Summary    Reason for therapy discharge:    All goals and outcomes met, no further needs identified.    Progress towards therapy goal(s). See goals on Care Plan in [facility] electronic health record for goal details.  Goals met    Therapy recommendation(s):    No further therapy is recommended.Pt currently functioning below baseline, limited by dec activity tolerance, pain, related to R TKA. Pt ind in ADLs at baseline, lives alone in [facility]. Pt reporting hired help to provide assistance 2x/day for IADLs and ADLs as needed. Pt reports dtr able to assist occassionally. Pt appropriate for d/c home with assist for LB dressing, toileting, bathing, heavy IADLs.

## 2025-03-26 NOTE — PROGRESS NOTES
3/25/2025 [id number]    DX: R TKA  POD: 2  Mental Status: Alert and oriented x 4  Activity: Assist of 1 with GB/walker to BR  Diet: Regular  Pain: scheduled Tylenol, PRN Oxycodone, PRN Atarax  [place]/Voiding: Continent, bathroom, requested purewick at HS  O2/LDA: Room air, PIV saline locked  Discharge:anticipate 3/26  Other Info: Contact precautions                     Dressing RLE CDI                     Denies dizziness/lightheadedness

## 2025-03-27 ENCOUNTER — HOSPITAL ENCOUNTER (EMERGENCY)
Facility: CLINIC | Age: 80
Discharge: HOME | End: 2025-03-28
Attending: EMERGENCY MEDICINE
Payer: MEDICARE

## 2025-03-27 ENCOUNTER — APPOINTMENT (OUTPATIENT)
Dept: MRI IMAGING | Facility: CLINIC | Age: 80
End: 2025-03-27
Attending: EMERGENCY MEDICINE
Payer: MEDICARE

## 2025-03-27 ENCOUNTER — APPOINTMENT (OUTPATIENT)
Dept: ULTRASOUND IMAGING | Facility: CLINIC | Age: 80
End: 2025-03-27
Attending: EMERGENCY MEDICINE
Payer: MEDICARE

## 2025-03-27 VITALS
OXYGEN SATURATION: 97 % | DIASTOLIC BLOOD PRESSURE: 88 MMHG | TEMPERATURE: 98.2 F | RESPIRATION RATE: 18 BRPM | SYSTOLIC BLOOD PRESSURE: 153 MMHG | HEART RATE: 89 BPM

## 2025-03-27 DIAGNOSIS — R32 URINARY INCONTINENCE, UNSPECIFIED TYPE: ICD-10-CM

## 2025-03-27 DIAGNOSIS — M79.89 RIGHT LEG SWELLING: ICD-10-CM

## 2025-03-27 LAB
ALBUMIN SERPL BCG-MCNC: 3.5 G/DL (ref 3.5–5.2)
ALBUMIN UR-MCNC: NEGATIVE MG/DL
ALP SERPL-CCNC: 114 U/L (ref 40–150)
ALT SERPL W P-5'-P-CCNC: 26 U/L (ref 0–50)
ANION GAP SERPL CALCULATED.3IONS-SCNC: 12 MMOL/L (ref 7–15)
APPEARANCE UR: CLEAR
AST SERPL W P-5'-P-CCNC: 36 U/L (ref 0–45)
BASOPHILS # BLD AUTO: 0 10E3/UL (ref 0–0.2)
BASOPHILS NFR BLD AUTO: 0 %
BILIRUB SERPL-MCNC: 0.8 MG/DL
BILIRUB UR QL STRIP: NEGATIVE
BUN SERPL-MCNC: 14.8 MG/DL (ref 8–23)
CALCIUM SERPL-MCNC: 8.8 MG/DL (ref 8.8–10.4)
CHLORIDE SERPL-SCNC: 94 MMOL/L (ref 98–107)
COLOR UR AUTO: ABNORMAL
CREAT SERPL-MCNC: 0.58 MG/DL (ref 0.51–0.95)
EGFRCR SERPLBLD CKD-EPI 2021: >90 ML/MIN/1.73M2
EOSINOPHIL # BLD AUTO: 0 10E3/UL (ref 0–0.7)
EOSINOPHIL NFR BLD AUTO: 0 %
ERYTHROCYTE [DISTWIDTH] IN BLOOD BY AUTOMATED COUNT: 13.2 % (ref 10–15)
GLUCOSE SERPL-MCNC: 119 MG/DL (ref 70–99)
GLUCOSE UR STRIP-MCNC: NEGATIVE MG/DL
HCO3 SERPL-SCNC: 23 MMOL/L (ref 22–29)
HCT VFR BLD AUTO: 27.6 % (ref 35–47)
HGB BLD-MCNC: 9.4 G/DL (ref 11.7–15.7)
HGB UR QL STRIP: NEGATIVE
IMM GRANULOCYTES # BLD: 0.1 10E3/UL
IMM GRANULOCYTES NFR BLD: 1 %
KETONES UR STRIP-MCNC: 10 MG/DL
LEUKOCYTE ESTERASE UR QL STRIP: NEGATIVE
LYMPHOCYTES # BLD AUTO: 0.5 10E3/UL (ref 0.8–5.3)
LYMPHOCYTES NFR BLD AUTO: 4 %
MCH RBC QN AUTO: 32.6 PG (ref 26.5–33)
MCHC RBC AUTO-ENTMCNC: 34.1 G/DL (ref 31.5–36.5)
MCV RBC AUTO: 96 FL (ref 78–100)
MONOCYTES # BLD AUTO: 1.1 10E3/UL (ref 0–1.3)
MONOCYTES NFR BLD AUTO: 8 %
NEUTROPHILS # BLD AUTO: 11.7 10E3/UL (ref 1.6–8.3)
NEUTROPHILS NFR BLD AUTO: 87 %
NITRATE UR QL: NEGATIVE
NRBC # BLD AUTO: 0 10E3/UL
NRBC BLD AUTO-RTO: 0 /100
PH UR STRIP: 7 [PH] (ref 5–7)
PLATELET # BLD AUTO: 158 10E3/UL (ref 150–450)
POTASSIUM SERPL-SCNC: 4.2 MMOL/L (ref 3.4–5.3)
PROT SERPL-MCNC: 6.2 G/DL (ref 6.4–8.3)
RBC # BLD AUTO: 2.88 10E6/UL (ref 3.8–5.2)
RBC URINE: 0 /HPF
SODIUM SERPL-SCNC: 129 MMOL/L (ref 135–145)
SP GR UR STRIP: 1.01 (ref 1–1.03)
UROBILINOGEN UR STRIP-MCNC: NORMAL MG/DL
WBC # BLD AUTO: 13.4 10E3/UL (ref 4–11)
WBC URINE: 2 /HPF

## 2025-03-27 PROCEDURE — 250N000013 HC RX MED GY IP 250 OP 250 PS 637: Performed by: EMERGENCY MEDICINE

## 2025-03-27 PROCEDURE — 93971 EXTREMITY STUDY: CPT | Mod: RT

## 2025-03-27 PROCEDURE — 250N000011 HC RX IP 250 OP 636: Performed by: EMERGENCY MEDICINE

## 2025-03-27 PROCEDURE — 255N000002 HC RX 255 OP 636: Performed by: EMERGENCY MEDICINE

## 2025-03-27 PROCEDURE — 84295 ASSAY OF SERUM SODIUM: CPT | Performed by: EMERGENCY MEDICINE

## 2025-03-27 PROCEDURE — 96374 THER/PROPH/DIAG INJ IV PUSH: CPT | Mod: 59

## 2025-03-27 PROCEDURE — 85018 HEMOGLOBIN: CPT | Performed by: EMERGENCY MEDICINE

## 2025-03-27 PROCEDURE — 99285 EMERGENCY DEPT VISIT HI MDM: CPT | Mod: 25

## 2025-03-27 PROCEDURE — 72158 MRI LUMBAR SPINE W/O & W/DYE: CPT

## 2025-03-27 PROCEDURE — 82310 ASSAY OF CALCIUM: CPT | Performed by: EMERGENCY MEDICINE

## 2025-03-27 PROCEDURE — A9585 GADOBUTROL INJECTION: HCPCS | Performed by: EMERGENCY MEDICINE

## 2025-03-27 PROCEDURE — 85004 AUTOMATED DIFF WBC COUNT: CPT | Performed by: EMERGENCY MEDICINE

## 2025-03-27 PROCEDURE — 81001 URINALYSIS AUTO W/SCOPE: CPT | Performed by: EMERGENCY MEDICINE

## 2025-03-27 PROCEDURE — 36415 COLL VENOUS BLD VENIPUNCTURE: CPT | Performed by: EMERGENCY MEDICINE

## 2025-03-27 RX ORDER — GADOBUTROL 604.72 MG/ML
7 INJECTION INTRAVENOUS ONCE
Status: COMPLETED | OUTPATIENT
Start: 2025-03-27 | End: 2025-03-27

## 2025-03-27 RX ORDER — LORAZEPAM 2 MG/ML
0.5 INJECTION INTRAMUSCULAR EVERY 30 MIN PRN
Status: DISCONTINUED | OUTPATIENT
Start: 2025-03-27 | End: 2025-03-28 | Stop reason: HOSPADM

## 2025-03-27 RX ORDER — METOPROLOL SUCCINATE 100 MG/1
100 TABLET, EXTENDED RELEASE ORAL ONCE
Status: COMPLETED | OUTPATIENT
Start: 2025-03-27 | End: 2025-03-27

## 2025-03-27 RX ORDER — OXYCODONE HYDROCHLORIDE 5 MG/1
5-10 TABLET ORAL EVERY 4 HOURS PRN
Status: DISCONTINUED | OUTPATIENT
Start: 2025-03-27 | End: 2025-03-28 | Stop reason: HOSPADM

## 2025-03-27 RX ORDER — LISINOPRIL 10 MG/1
10 TABLET ORAL ONCE
Status: COMPLETED | OUTPATIENT
Start: 2025-03-27 | End: 2025-03-27

## 2025-03-27 RX ADMIN — METOPROLOL SUCCINATE 100 MG: 100 TABLET, EXTENDED RELEASE ORAL at 21:30

## 2025-03-27 RX ADMIN — LORAZEPAM 0.5 MG: 2 INJECTION INTRAMUSCULAR; INTRAVENOUS at 21:31

## 2025-03-27 RX ADMIN — GADOBUTROL 7 ML: 604.72 INJECTION INTRAVENOUS at 22:28

## 2025-03-27 RX ADMIN — LISINOPRIL 10 MG: 10 TABLET ORAL at 21:30

## 2025-03-27 RX ADMIN — OXYCODONE HYDROCHLORIDE 5 MG: 5 TABLET ORAL at 22:34

## 2025-03-27 ASSESSMENT — ACTIVITIES OF DAILY LIVING (ADL)
ADLS_ACUITY_SCORE: 63
ADLS_ACUITY_SCORE: 57
ADLS_ACUITY_SCORE: 57
ADLS_ACUITY_SCORE: 63

## 2025-03-27 ASSESSMENT — COLUMBIA-SUICIDE SEVERITY RATING SCALE - C-SSRS
1. IN THE PAST MONTH, HAVE YOU WISHED YOU WERE DEAD OR WISHED YOU COULD GO TO SLEEP AND NOT WAKE UP?: NO
2. HAVE YOU ACTUALLY HAD ANY THOUGHTS OF KILLING YOURSELF IN THE PAST MONTH?: NO
6. HAVE YOU EVER DONE ANYTHING, STARTED TO DO ANYTHING, OR PREPARED TO DO ANYTHING TO END YOUR LIFE?: NO

## 2025-03-27 NOTE — ED TRIAGE NOTES
Pt comes from urgent care with concern for neurological problem. Had knee replacement 3 days ago. Pt experienced sudden onset of urinary incontinence that started yesterday and hasn't been able to stop. No prior hx of this happening before.

## 2025-03-28 NOTE — ED PROVIDER NOTES
Emergency Department Note      History of Present Illness     Chief Complaint   Post-op Problem      HPI   Sixto Jimenez is a 80 year old female with history of colon cancer and urethral meatal stenosis who presents for evaluation of a post-op problem. Patient reports that she is 3 days status post a right total knee arthroplasty and was discharged from [facility] yesterday to her home with care. Then last night at home around 1900 she began experiencing constant urinary incontinence and today developed a large purpling bruise on her right medial ankle that she is concerned about, noting that she went to Urgent Care earlier today where concerns were raised for a neurological problem. She has been taking pain medication following the surgery. Patient notes that she is unsure how she was anesthetized for the operation. She denies experiencing any cough, cold, fever, diarrhea, pain with urination, abnormal odor or burning with urination, chest pain, or shortness of breath. No history of stroke. No history of similar events in the past.       Independent Historian   None    Review of External Notes   I reviewed patient's anesthesia note with Dr. Bond on March 24, 2025 at 1:13 PM.  Patient had a intrathecal procedure note that was reviewed.  The site for insertion was at L3-4.    Past Medical History     Medical History and Problem List   Chronic abdominal and lower back pain  Chronic constipation  Closed fracture of part of fibula and distal end of left radius  Colocutaneous fistula  Fibromyalgia  Hypertension  Hyperlipidemia  Impingement of right shoulder  Malignant neoplasm of transverse colon  Mitral valve prolapse  Osteoarthritis of CMC joint of thumb  Osteoporosis  Sciatica of left side  Urethral meatal stenosis      Medications   Restoril  Senokot  Xarelto  Oxycodone  Toprol  Ativan  Lisinopril  Atarax  Norco  Celebrex  Elavil      Surgical History   Tubal ligation  Right total hip arthroplasty   Right total  knee arthroplasty   Laparoscopic cholecystectomy  Colon lysis of adhesions, ileostomy take down  Colectomy   Colonoscopy x3  Guided abdominal drain placement  Esophagogastroduodenoscopy x2   Cystoscopy  Exploratory laparotomy with bowel resection and ileostomy  Exploratory laparotomy, extensive lysis of adhesins, resection of ileocolic anastamosis, end ileostomy, removal of mesh  Fractured fibula and wrist  Portacath placement and removal   Tonsillectomy  IR colon with contrast: multiple loops of redundant colon, prominent sigmoid      Physical Exam     Patient Vitals for the past 24 hrs:   BP Temp Temp src Pulse Resp SpO2   03/27/25 2315 (!) 153/88 -- -- 89 18 97 %   03/27/25 2130 (!) 162/88 -- -- 92 -- --   03/27/25 1940 (!) 143/103 -- -- 99 -- --   03/27/25 1935 -- -- -- -- -- 95 %   03/27/25 1805 (!) 167/89 98.2  F (36.8  C) Temporal 107 18 99 %     Physical Exam  General: Alert, appears well-developed and well-nourished. Cooperative.     In moderate distress, needs two-person assist to get into bed given recent surgery to right lower extremity and swelling.  HEENT:  Head:  Atraumatic  Ears:  External ears are normal  Mouth/Throat:  Oropharynx is without erythema or exudate and mucous membranes are moist.   Eyes:   Conjunctivae normal and EOM are normal. No scleral icterus.  CV:  Normal rate, regular rhythm, normal heart sounds and radial pulses are 2+ and symmetric.    Resp:  Breath sounds are clear bilaterally    Non-labored, no retractions or accessory muscle use  GI:  Abdomen is soft, no distension, no tenderness. No rebound or guarding.  No CVA tenderness bilaterally  MS:  Right lower extremity with significant swelling postoperatively from total knee replacement.  There is copious bruising around the knee joint itself as well as to the medial right ankle.  Distal dorsal pedis pulses palpable and patient does have movement of toes foot and ankle.  Sensation intact to the right lower extremity.  Patient with  copious swelling throughout the right lower leg.  Right thigh is nontender.  Left lower extremity with full range of motion intact strength and sensation.  Distal CMS left lower extremity intact.      Back atraumatic.    No midline cervical, thoracic, or lumbar tenderness  Skin:  Warm and dry.  Postsurgical wound to right lower extremity from total knee replacement.  Copious bruising described in the right lower extremity above.  Neuro:   Alert.  Strength examination of the right lower extremity is limited given pain and swelling at the right knee.  Gross sensation intact to the right lower extremity.  Remainder of extremities with intact sensation. No saddle anesthesia. GCS 15.  Patient is able to ambulate although two-person assist with minimal manipulation of the right lower extremity given copious swelling and pain.  Psych: Normal mood and affect.    Diagnostics     Lab Results   Labs Ordered and Resulted from Time of ED Arrival to Time of ED Departure   COMPREHENSIVE METABOLIC PANEL - Abnormal       Result Value    Sodium 129 (*)     Potassium 4.2      Carbon Dioxide (CO2) 23      Anion Gap 12      Urea Nitrogen 14.8      Creatinine 0.58      GFR Estimate >90      Calcium 8.8      Chloride 94 (*)     Glucose 119 (*)     Alkaline Phosphatase 114      AST 36      ALT 26      Protein Total 6.2 (*)     Albumin 3.5      Bilirubin Total 0.8     ROUTINE UA WITH MICROSCOPIC REFLEX TO CULTURE - Abnormal    Color Urine Light Yellow      Appearance Urine Clear      Glucose Urine Negative      Bilirubin Urine Negative      Ketones Urine 10 (*)     Specific Gravity Urine 1.009      Blood Urine Negative      pH Urine 7.0      Protein Albumin Urine Negative      Urobilinogen Urine Normal      Nitrite Urine Negative      Leukocyte Esterase Urine Negative      RBC Urine 0      WBC Urine 2     CBC WITH PLATELETS AND DIFFERENTIAL - Abnormal    WBC Count 13.4 (*)     RBC Count 2.88 (*)     Hemoglobin 9.4 (*)     Hematocrit 27.6  (*)     MCV 96      MCH 32.6      MCHC 34.1      RDW 13.2      Platelet Count 158      % Neutrophils 87      % Lymphocytes 4      % Monocytes 8      % Eosinophils 0      % Basophils 0      % Immature Granulocytes 1      NRBCs per 100 WBC 0      Absolute Neutrophils 11.7 (*)     Absolute Lymphocytes 0.5 (*)     Absolute Monocytes 1.1      Absolute Eosinophils 0.0      Absolute Basophils 0.0      Absolute Immature Granulocytes 0.1      Absolute NRBCs 0.0         Imaging   US Lower Extremity Venous Duplex Right   Final Result   IMPRESSION:   1.  No deep venous thrombosis in the right lower extremity.      2.  No popliteal cyst.      3.  No significant change.      Lumbar spine MRI w & w/o contrast - surgery <10yrs   Final Result   IMPRESSION:   1.  No acute findings.   2.  At L3-4 and L4-5, moderate spinal stenosis.   3.  At L3-4 and L4-5, severe left foraminal stenosis.             Independent Interpretation   None    ED Course      Medications Administered   Medications   LORazepam (ATIVAN) injection 0.5 mg (0.5 mg Intravenous $Given 3/27/25 2131)   oxyCODONE (ROXICODONE) tablet 5-10 mg (5 mg Oral $Given 3/27/25 2234)   lisinopril (ZESTRIL) tablet 10 mg (10 mg Oral $Given 3/27/25 2130)   metoprolol succinate ER (TOPROL XL) 24 hr tablet 100 mg (100 mg Oral $Given 3/27/25 2130)   gadobutrol (GADAVIST) injection 7 mL (7 mLs Intravenous $Given 3/27/25 2228)   sodium chloride (PF) 0.9% PF flush 10 mL (10 mLs Intravenous $Given 3/27/25 2228)       Procedures   Procedures     Discussion of Management   None    ED Course   ED Course as of 03/27/25 2346   Thu Mar 27, 2025   1927 I obtained patient history and performed a physical exam.    2339 I reassessed patient.        Additional Documentation  None    Medical Decision Making / Diagnosis     CMS Diagnoses: None    MIPS       None    MDM   Sixto Jimenez is a 80 year old female who presents with urinary incontinence in the setting of recent right total knee replacement  from 3/24.  Patient notes that she has had urinary incontinence over the last 24 hours.  She is also had copious swelling and pain in the right lower extremity.  She notes she did have a spinal block as well as a nerve block in the right lower extremity.  She had a broad workup pursued today given the urinary incontinence presentation.  I was concerned for potential cauda equina versus epidural hematoma versus other sinister central process.  MRI imaging of the lumbar spine was obtained with and without contrast.  Thankfully no acute findings.  There was moderate spinal canal stenosis at L3-4 and L4-5.  There is also severe left foraminal stenosis at L3-4 and L4-5.  Patient has no saddle anesthesia and postvoid residual did not show greater than 100 cc of urine.  Urinalysis shows no sign of infection.  Patient mildly hyponatremic but normal renal function and electrolytes otherwise.  CBC appears to be baseline for the patient with a likely stress demargination with recent surgery.  We did obtain a right lower extremity ultrasound which shows no evidence of DVT.  Additionally no sign of Baker's cyst.  Suspect swelling all secondary to recent operative intervention.  Encouraged copious elevation and ice packs as needed with ongoing pain control.  Additionally unclear to unifying etiology for the patient's urinary incontinence but no sinister pathologies identified this evening necessitating further emergent workup or hospitalization.  Certainly if she were to develop severe abdominal pain, fever, or any new concerning symptom onset she should represent to the emergency department for repeat assessment.  Plan for close follow-up with orthopedic surgery in the outpatient setting and after all return precautions understood, discharged home.    Disposition   The patient was discharged.     Diagnosis     ICD-10-CM    1. Urinary incontinence, unspecified type  R32       2. Right leg swelling  M79.89            Discharge  Medications   New Prescriptions    No medications on file         Scribe Disclosure:  I, Velvet Jack, am serving as a scribe at 7:24 PM on 3/27/2025 to document services personally performed by Moisés Almodovar MD based on my observations and the provider's statements to me.        Moisés Almodovar MD  03/27/25 8538

## 2025-03-28 NOTE — DISCHARGE INSTRUCTIONS
Thankfully your MRI did not show any sinister reason for your urinary incontinence tonight.  No signs of urinary tract infection.  Your kidney function looks normal.  You are not retaining any significant urine after you urinate.    You may still have some urethral dilation from having a Marrero catheter during surgery and this could explain potentially some of your incontinence.  If your symptoms continue please do follow-up with your primary care doctor and surgeon.  You might need a referral to urology in the near future if this does continue.

## 2025-04-14 ENCOUNTER — TELEPHONE (OUTPATIENT)
Dept: SURGERY | Facility: CLINIC | Age: 80
End: 2025-04-14
Payer: MEDICARE

## 2025-04-14 DIAGNOSIS — R11.0 NAUSEA: ICD-10-CM

## 2025-04-14 DIAGNOSIS — R10.31 ABDOMINAL PAIN, RIGHT LOWER QUADRANT: Primary | ICD-10-CM

## 2025-04-14 NOTE — TELEPHONE ENCOUNTER
Patient confirmed scheduled appointment:  Date: 4/21/25  Time: 6:00 PM   Visit type: Imaging  Provider: Radiology  Location: [place]  Testing/imaging: CT CAP  Additional notes: ordered by      Patient confirmed scheduled appointment:  Date: 5/1/25  Time: 2:00 PM   Visit type: New Patient   Provider:    Location: [place]  Testing/imaging: n/a  Additional notes: nausea and right sided abd pain from scar tissue. Hx of colon cancer with 6 surgeries d/t complications

## 2025-04-15 ENCOUNTER — PRE VISIT (OUTPATIENT)
Dept: SURGERY | Facility: CLINIC | Age: 80
End: 2025-04-15
Payer: MEDICARE

## 2025-04-15 NOTE — CONFIDENTIAL NOTE
COLON AND RECTAL SURGERY PRE-VISIT:  Diagnosis, Referred by & from: Abdominal pain post surgeries. No referral.   Appt date: 5/1/2025 with Dr. Cherry at [facility]   NOTES STATUS DETAILS   OFFICE NOTE from referring provider N/A    OFFICE NOTE from other specialist Received [facility]:  2/7/2022- Dr. Cherry   DISCHARGE SUMMARY from hospital N/A    DISCHARGE REPORT from the ER N/A    OPERATIVE REPORT Care Everywhere [facility]:  8/13/2013- Dr. Vázquez  6/28/2012- Dr. Vázquez  3/1/2012- Dr. Vázquez  12/14/2011- Dr. Gabriel  12/9/2011- Dr. Gabriel  10/16/2010- Dr. Gabriel  10/14/2010- Dr. Gabriel  9/29/2010- Dr. Edge   MEDICATION LIST [facility]   LABS N/A    DIAGNOSTIC PROCEDURES     PFC TESTING N/A    COLONOSCOPY [facility]:  4/26/2017- Dr. Vázquez  4/23/204- Dr. Vázquez   UPPER ENDOSCOPY (EGD) N/A    FLEX SIGMOIDOSCOPY N/A    ERCP N/A    IMAGING (DISC & REPORT)      CT Care Everywhere [facility]:  4/21/2025- CT A/P    [facility]:  12/28/2022- CT A/P  4/15/2022- CT A/P  6/21/2018- CT A/P  11/2/2016- CT A/P  9/4/2015- CT A/P   MRI N/A    XRAY N/A    ULTRASOUND N/A      Records Requested       Record  Facility Outcome:   None [facility]     04/15/25 at 4:04 PM:  Images in  PACS.    Complete [x]     All relevant records are bookmarked under Lorne Baugh, EMT.  Lorne Baugh, NREMT  Colon and Rectal Surgery

## 2025-04-21 ENCOUNTER — HOSPITAL ENCOUNTER (OUTPATIENT)
Dept: CT IMAGING | Facility: CLINIC | Age: 80
Discharge: HOME | End: 2025-04-21
Attending: COLON & RECTAL SURGERY | Admitting: COLON & RECTAL SURGERY
Payer: MEDICARE

## 2025-04-21 DIAGNOSIS — R10.31 ABDOMINAL PAIN, RIGHT LOWER QUADRANT: ICD-10-CM

## 2025-04-21 DIAGNOSIS — R11.0 NAUSEA: ICD-10-CM

## 2025-04-21 PROCEDURE — 250N000011 HC RX IP 250 OP 636: Performed by: COLON & RECTAL SURGERY

## 2025-04-21 PROCEDURE — 74177 CT ABD & PELVIS W/CONTRAST: CPT

## 2025-04-21 PROCEDURE — 250N000009 HC RX 250: Performed by: COLON & RECTAL SURGERY

## 2025-04-21 RX ORDER — IOPAMIDOL 755 MG/ML
70 INJECTION, SOLUTION INTRAVASCULAR ONCE
Status: COMPLETED | OUTPATIENT
Start: 2025-04-21 | End: 2025-04-21

## 2025-04-21 RX ADMIN — SODIUM CHLORIDE 60 ML: 9 INJECTION, SOLUTION INTRAVENOUS at 18:17

## 2025-04-21 RX ADMIN — IOPAMIDOL 70 ML: 755 INJECTION, SOLUTION INTRAVENOUS at 18:17

## 2025-04-22 LAB — RADIOLOGIST FLAGS: NORMAL

## 2025-04-24 ENCOUNTER — TELEPHONE (OUTPATIENT)
Dept: SURGERY | Facility: CLINIC | Age: 80
End: 2025-04-24
Payer: MEDICARE

## 2025-04-24 DIAGNOSIS — R93.2 ABNORMAL FINDING ON IMAGING OF LIVER: Primary | ICD-10-CM

## 2025-04-24 NOTE — TELEPHONE ENCOUNTER
Received incoming call from radiology regarding incidental finding on CT CAP from 4/21:    Subtle ill-defined area of hypoattenuation in the liver. This may represent focal fat deposition, but is indeterminate. Recommend follow-up contrast-enhanced liver MRI.     Discussed with Dr. Cherry. Will plan to follow up with liver MRI.     Discussed results with patient, she will call to get liver MRI set up. No further questions at this time. Appointment as scheduled with Dr. Cherry on 5/1.

## 2025-04-25 ENCOUNTER — ANCILLARY PROCEDURE (OUTPATIENT)
Dept: MRI IMAGING | Facility: CLINIC | Age: 80
End: 2025-04-25
Attending: COLON & RECTAL SURGERY
Payer: MEDICARE

## 2025-04-25 DIAGNOSIS — R93.2 ABNORMAL FINDING ON IMAGING OF LIVER: ICD-10-CM

## 2025-04-25 PROCEDURE — 74183 MRI ABD W/O CNTR FLWD CNTR: CPT

## 2025-04-25 PROCEDURE — 255N000002 HC RX 255 OP 636: Performed by: COLON & RECTAL SURGERY

## 2025-04-25 PROCEDURE — A9581 GADOXETATE DISODIUM INJ: HCPCS | Performed by: COLON & RECTAL SURGERY

## 2025-04-25 RX ORDER — GADOBUTROL 604.72 MG/ML
6.5 INJECTION INTRAVENOUS ONCE
Status: COMPLETED | OUTPATIENT
Start: 2025-04-25 | End: 2025-04-25

## 2025-04-25 RX ADMIN — GADOXETATE DISODIUM 13 ML: 181.43 INJECTION, SOLUTION INTRAVENOUS at 14:26

## 2025-05-01 ENCOUNTER — OFFICE VISIT (OUTPATIENT)
Dept: SURGERY | Facility: CLINIC | Age: 80
End: 2025-05-01
Payer: MEDICARE

## 2025-05-01 VITALS
DIASTOLIC BLOOD PRESSURE: 86 MMHG | WEIGHT: 145.7 LBS | SYSTOLIC BLOOD PRESSURE: 144 MMHG | BODY MASS INDEX: 24.87 KG/M2 | HEART RATE: 65 BPM | OXYGEN SATURATION: 99 % | HEIGHT: 64 IN

## 2025-05-01 DIAGNOSIS — K25.9 MULTIPLE GASTRIC ULCERS: Primary | ICD-10-CM

## 2025-05-01 RX ORDER — PANTOPRAZOLE SODIUM 40 MG/1
40 TABLET, DELAYED RELEASE ORAL DAILY
Qty: 30 TABLET | Refills: 0 | Status: SHIPPED | OUTPATIENT
Start: 2025-05-01 | End: 2025-05-31

## 2025-05-01 ASSESSMENT — PAIN SCALES - GENERAL: PAINLEVEL_OUTOF10: MILD PAIN (3)

## 2025-05-01 NOTE — LETTER
2025      Sixto Jimenez  [address] [place]  [address]  [facility] [address]      Dear Colleague,    Thank you for referring your patient, Sixto Jimenez, to the [facility] [place]. Please see a copy of my visit note below.    Colon and Rectal Surgery Clinic Note    RE: Sixto Jimenez.  : 1945.  KEEGAN: 2025.     Reason for visit: Here for follow up MRI    HPI: Sixto Jimenez is a pleasant 80 year old female with past medical history of adenocarcinoma or ascending colon who presents today for follow up of MRI imaging for liver lesion seen on CT as an incidental finding. Patient has very complicated surgical history which included right colectomy for colon cancer c/b anastomotic leak. I did her last colonosocpy which was 2022 which showed melanosis in the colon but was otherwise normal. She has a 5 year recall for her colonoscopies.     Sixto has been living in [place].  She has had multiple orthopedic surgeries on the right (shoulder, knee, hip).  Was taking a lot of NSAIDs, developed stomach ulcers.  Had EGD in [place] and was supposed to get follow up 6 months later but wasn't able given surgeries. Was on PPI but for some reason stopped.  Has some chronic right sided abdominal pain.  On Norco but hoping to wean off/restart Celebrex again soon.    MR abdomen (25)  IMPRESSION:  Study limitation: Noncontrast technique.     1. Right hepatic lobe signal abnormality is favored to reflect sequelae of focal fatty infiltration.      2. No convincing suspicious liver lesion.    CT AP (25)  IMPRESSION:   1.  No acute CT findings in the abdomen or pelvis.     2.  Subtle ill-defined area of hypoattenuation in the liver. This may represent focal fat deposition, but is indeterminate. Recommend follow-up contrast-enhanced liver MRI.    Medical history:  Past Medical History:   Diagnosis Date     Allergic rhinitis      Chronic abdominal pain      Chronic bilateral low back pain     DDD      Chronic constipation      Closed fracture of part of fibula      Closed fractures of distal end of radius (alone), left      Colocutaneous fistula 2012     Fibromyalgia      HTN (hypertension)      Hx of chemotherapy      Hyperlipidemia      Impingement of right shoulder      Malignant neoplasm of transverse colon (H) 2010    Stage 3A 2/22 nodes positive: [place] [facility] [place]ed mass: Invasive adenocarcinoma: CEA 3.7, size 2 x 12.7 cm.     Mitral valve prolapse      Mixed hyperlipidemia 01/28/2019     Motion sickness      Myalgia and myositis      Osteoarthritis of CMC joint of thumb, bilateral      Osteoporosis      PVC's (premature ventricular contractions)      Sciatica of left side      Urethral meatal stenosis 2010    Dilated twice by Dr. Eaton of Urology       Surgical history:  Past Surgical History:   Procedure Laterality Date     ARTHROPLASTY HIP Right 09/21/2023    Procedure: Right total hip arthroplasty;  Surgeon: Teo Sanchez MD;  Location:  OR     ARTHROPLASTY KNEE Right 3/24/2025    Procedure: RIGHT TOTAL KNEE ARTHROPLASTY;  Surgeon: Fer Montero MD;  Location:  OR     CHOLECYSTECTOMY, LAPOROSCOPIC      Cholecystectomy, Laparoscopic     COLON LYSIS OF ADHESIONS, ILEOSTOMY TAKE DOWN  12/14/2011     COLON SURGERY  10/14/2010    Colectomy     COLONOSCOPY  04/23/2014    Procedure: COLONOSCOPY;  Surgeon: Diana Vázquez MD;  Location:  GI     COLONOSCOPY N/A 04/26/2017    Procedure: COLONOSCOPY;  COLONOSCOPY (MAC) ;  Surgeon: Diana Vázquez MD;  Location:  GI     COLONOSCOPY  2010     CT GUIDED ABDOMINAL DRAIN PLACEMENT  03/29/2012     CYSTOSCOPY  2004     ESOPHAGOGASTRODUODENOSCOPY  12/2023     ESOPHAGOGASTRODUODENOSCOPY  2013     EX LAP, ILEOCOLIC RESECTION, ILEOSTOMY  10/16/2010     EXPLORATORY LAPAROTOMY W/ BOWEL RESECTION  10/16/2010    ex lap, ileocolic resection, ileostomy for postoperative bowel perforation     EXPLORATORY LAPAROTOMY,  EXTENSIVE LYSIS OF ADHESIONS, RESECTION OF ILEOCOLIC ANASTOMOSIS, END ILEOSTOMY, REMOVAL OF MESH  2012     FRACTURED FIBULA       FRACTURED WRIST       ILEOSTOMY      leaks afterwards     IR COLON W/ CONTRAST: MULTIPLE LOOPS OF REDUNDANT COLON, PROMINENT SIGMOID.  2004     PORTACATH PLACEMENT & REMOVAL       SHOULDER REPLACEMENT Right 2024     TONSILLECTOMY       TUBAL LIGATION         Problem list:    Patient Active Problem List    Diagnosis Date Noted     S/P total knee arthroplasty, right 2025     Priority: Medium     S/P total hip arthroplasty 2023     Priority: Medium     Closed displaced fracture of right femoral neck (H) 2023     Priority: Medium     Degeneration of lumbar intervertebral disc 11/10/2020     Priority: Medium     History of malignant neoplasm 2020     Priority: Medium     Mixed hyperlipidemia 2019     Priority: Medium     Overview:   Last Lipids:  Chol: 245    2007  T    2007  HDL:   94    2007  LDL:  128    2007       Fibromyalgia 2019     Priority: Medium     Shoulder impingement syndrome, right 2018     Priority: Medium     Benign essential hypertension 10/15/2017     Priority: Medium     Overview:   Trial of lisinopril, propranolol; Stresses anxety, amlodipine 2.5 min bowel not emptying  Metoprolol: 25 mg is ok.  Increase to 1.5, bowel does not  across upper right. Stenotic area.        Chronic constipation 2017     Priority: Medium     Sciatica of left side 2015     Priority: Medium     Formatting of this note might be different from the original. MRI: 2015 forminal stenosis L 4,5 more on left       Osteoarthritis of CMC joint of thumb 2013     Priority: Medium     Encounter for long-term (current) use of high-risk medication 02/15/2013     Priority: Medium     Colocutaneous fistula 2012     Priority: Medium     Malignant neoplasm of transverse colon (H) 10/06/2010      Priority: Medium     Formatting of this note might be different from the original. Stage 3A  2/22 nodes positive: 10/6/2010 on colonoscopy: at [facility].   Noted mass:  Invasive adenocarcinoma:  CEA 3.7, size 2 x 12.7 cm. Dr. Vázquez. Current surgeon Treated with chemo:       Abdominal pain 06/21/2009     Priority: Medium     Formatting of this note might be different from the original. Dr Stanley Goldberg:  Feels she had sigmoind volvulous       Allergic rhinitis 06/20/2007     Priority: Medium     Symptomatic menopausal or female climacteric states 10/06/2006     Priority: Medium     Closed fracture of part of fibula 09/22/2006     Priority: Medium     Formatting of this note might be different from the original. DEXA -2.1       Osteoporosis 09/22/2006     Priority: Medium     Formatting of this note is different from the original. VITAMIN D TOTAL Date Value Range Status 1/24/2013 44.6  30.0 - 80.0 ng/mL Final 10/9/2009 39.7  30.0-80.0 ng/mL Final osteoporosis diagnosed low bone density and wrist fracture. She's completed 2 years of risedronate, and 3 years ofReclast: 2009- 2014 completed DEXA: AP-1.2, hip right and left:  -2.1 Fracture fibula age 51 Fosamax. 6/20/2007 started one dose: severe pain in epigastrium. Actonel 6/07- 10/08.  Stopped for GI problem: nocturnal pain 20 mins then resolves.  Increased in frequency. Still gets the pain once per week. 10/8/2009 DEXA right hip -2.3, -2.1, ap: -1.3 better spine, no change hips 12/3/2012 DEXA: -2.1 no sig change c/w 2009 12/3/2012 FRAX calculation of Fracture risk: Major osteoporotic fracture 17.01 % risk in 10 years Hip Fracture:  3.1 % risk in 10 years. Treatment is recommended if over 3% for Hip fracture or over 20% for any fracture. This calculation is based on previous fractures, height, weight, age, family history, personal habits (smoking and drinking), and use of medications that increases risk, as well as bone density.         Medications:  Current Outpatient  "Medications   Medication Sig Dispense Refill     acetaminophen (TYLENOL) 325 MG tablet Take 3 tablets (975 mg) by mouth every 8 hours as needed for other (mild pain). 60 tablet 0     amitriptyline (ELAVIL) 25 MG tablet Take 1 tablet (25 mg) by mouth At Bedtime 30 tablet 0     cefdinir (OMNICEF) 300 MG capsule Take 1 capsule (300 mg) by mouth 2 times daily. 20 capsule 0     celecoxib (CELEBREX) 200 MG capsule Take 1 capsule (200 mg) by mouth daily for 14 days. Do not take within 6 hours of ibuprofen (MOTRIN, ADVIL) or ketorolac (TORADOL) if prescribed. 14 capsule 0     HYDROcodone-acetaminophen (NORCO) 5-325 MG tablet Take 1-2 tablets by mouth every 4 hours as needed for moderate to severe pain. 30 tablet 0     hydrOXYzine HCl (ATARAX) 25 MG tablet Take 1 tablet (25 mg) by mouth every 6 hours as needed for itching or anxiety (with pain, moderate pain). (Patient not taking: Reported on 4/25/2025) 60 tablet 2     lisinopril (ZESTRIL) 10 MG tablet Take 10 mg by mouth daily       LORazepam (ATIVAN) 0.5 MG tablet Take 0.5 tablets (0.25 mg) by mouth 2 times daily as needed for anxiety 10 tablet 0     metoprolol succinate ER (TOPROL-XL) 25 MG 24 hr tablet Take 100 mg by mouth daily. (4 x 25 mg = 100 mg)       rivaroxaban ANTICOAGULANT (XARELTO) 10 MG TABS tablet Take 1 tablet (10 mg) by mouth daily (with breakfast). (Patient not taking: Reported on 4/25/2025) 13 tablet 0     senna-docusate (SENOKOT-S/PERICOLACE) 8.6-50 MG tablet Take 1-2 tablets by mouth 2 times daily. Take while on oral narcotics to prevent or treat constipation. (Patient not taking: Reported on 4/25/2025) 30 tablet 0     temazepam (RESTORIL) 15 MG capsule Take 1 capsule (15 mg) by mouth At Bedtime 15 capsule 0       Allergies:  Allergies   Allergen Reactions     Alendronate GI Disturbance     Shut down system  Constipation     Morphine Headache     Mental status change \"felt Psychotic\"     Risedronate GI Disturbance     Shut down system  Constipation "       Family history:  Family History   Problem Relation Age of Onset     Angina Mother      Heart Disease Father      Cerebrovascular Disease Sister        Social history:  Social History     Tobacco Use     Smoking status: Former     Current packs/day: 0.00     Average packs/day: 0.5 packs/day for 12.0 years (6.0 ttl pk-yrs)     Types: Cigarettes     Start date: 1968     Quit date: 1980     Years since quittin.3     Smokeless tobacco: Never   Substance Use Topics     Alcohol use: Not Currently    Marital status: .  Occupation: n/a.    There are no exam notes on file for this visit.     Physical Examination:  There were no vitals taken for this visit.  General: alert, oriented, in no acute distress, sitting comfortably  Respiratory: non-labored breathing on RA  Abdomen: soft, nondistended.  Midline hernia large, reducible.  No mass       Assessment:   This is a 80 year old female with a history of colon cancer.  Right sided abdominal pain with normal imaging.  May be muscular, related from hernia.  Nothing to worry about.  Some chronic nausea.  Will restart PPI to see if improves.  Has some constipation on CT, will increase senna slightly.  Wants to restart Celebrex/wean off hydrocodone - will get EGD follow up.       All pertinent labs and imaging were personally reviewed by me.     Plan:  - We will restart your PPI to see if it helps with nausea  - Recommend considering repeat EGD prior to starting Celebrex if you do  - Increased your evening Senna S dose to 2 tablets;  keep the AM dose at one tablet   - EGD - follow up NSAID ulcers     Loren Cherry MD     Division of Colon & Rectal Surgery  [facility]        The Division of Colon and Rectal Surgery at [facility] is committed to advancing discovery and innovation in human health through research. Sixto Jimenez is willing to be contacted by our research team if he qualify for any future research  studies: N/A    This note was created using speech recognition software and may contain unintended word substitutions.    Referring provider:  Diana Vázquez MD  [facility]  [address] [address]     Primary Care Provider:  Nirali Lenz      Again, thank you for allowing me to participate in the care of your patient.        Sincerely,        Loren Cherry MD    Electronically signed

## 2025-05-01 NOTE — NURSING NOTE
"Chief Complaint   Patient presents with    Consult     Nausea and right sided abd pain from scar tissue. Hx of colon cancer with 6 surgeries d/t complications.       Vitals:    05/01/25 1411   BP: (!) 144/86   Pulse: 65   SpO2: 99%   Weight: 66.1 kg (145 lb 11.2 oz)   Height: 1.626 m (5' 4\")       Body mass index is 25.01 kg/m .     JOSE CRUZ Garcia LPN  "

## 2025-05-01 NOTE — PROGRESS NOTES
Colon and Rectal Surgery Clinic Note    RE: Sixto Jimenez.  : 1945.  KEEGAN: 2025.     Reason for visit: Here for follow up MRI    HPI: Sixto Jimenez is a pleasant 80 year old female with past medical history of adenocarcinoma or ascending colon who presents today for follow up of MRI imaging for liver lesion seen on CT as an incidental finding. Patient has very complicated surgical history which included right colectomy for colon cancer c/b anastomotic leak. I did her last colonosocpy which was 2022 which showed melanosis in the colon but was otherwise normal. She has a 5 year recall for her colonoscopies.     Sixto has been living in [place].  She has had multiple orthopedic surgeries on the right (shoulder, knee, hip).  Was taking a lot of NSAIDs, developed stomach ulcers.  Had EGD in [place] and was supposed to get follow up 6 months later but wasn't able given surgeries. Was on PPI but for some reason stopped.  Has some chronic right sided abdominal pain.  On Norco but hoping to wean off/restart Celebrex again soon.    MR abdomen (25)  IMPRESSION:  Study limitation: Noncontrast technique.     1. Right hepatic lobe signal abnormality is favored to reflect sequelae of focal fatty infiltration.      2. No convincing suspicious liver lesion.    CT AP (25)  IMPRESSION:   1.  No acute CT findings in the abdomen or pelvis.     2.  Subtle ill-defined area of hypoattenuation in the liver. This may represent focal fat deposition, but is indeterminate. Recommend follow-up contrast-enhanced liver MRI.    Medical history:  Past Medical History:   Diagnosis Date    Allergic rhinitis     Chronic abdominal pain     Chronic bilateral low back pain     DDD    Chronic constipation     Closed fracture of part of fibula     Closed fractures of distal end of radius (alone), left     Colocutaneous fistula     Fibromyalgia     HTN (hypertension)     Hx of chemotherapy     Hyperlipidemia     Impingement of right  shoulder     Malignant neoplasm of transverse colon (H) 2010    Stage 3A 2/22 nodes positive: 10/6/2010 on colonoscopy: at [facility] [place]. Noted mass: Invasive adenocarcinoma: CEA 3.7, size 2 x 12.7 cm.    Mitral valve prolapse     Mixed hyperlipidemia 01/28/2019    Motion sickness     Myalgia and myositis     Osteoarthritis of CMC joint of thumb, bilateral     Osteoporosis     PVC's (premature ventricular contractions)     Sciatica of left side     Urethral meatal stenosis 2010    Dilated twice by Dr. Eaton of Urology       Surgical history:  Past Surgical History:   Procedure Laterality Date    ARTHROPLASTY HIP Right 09/21/2023    Procedure: Right total hip arthroplasty;  Surgeon: Teo Sanchez MD;  Location:  OR    ARTHROPLASTY KNEE Right 3/24/2025    Procedure: RIGHT TOTAL KNEE ARTHROPLASTY;  Surgeon: Fer Montero MD;  Location:  OR    CHOLECYSTECTOMY, LAPOROSCOPIC      Cholecystectomy, Laparoscopic    COLON LYSIS OF ADHESIONS, ILEOSTOMY TAKE DOWN  12/14/2011    COLON SURGERY  10/14/2010    Colectomy    COLONOSCOPY  04/23/2014    Procedure: COLONOSCOPY;  Surgeon: Diana Vázquez MD;  Location:  GI    COLONOSCOPY N/A 04/26/2017    Procedure: COLONOSCOPY;  COLONOSCOPY (MAC) ;  Surgeon: Diana Vázquez MD;  Location:  GI    COLONOSCOPY  2010    CT GUIDED ABDOMINAL DRAIN PLACEMENT  03/29/2012    CYSTOSCOPY  2004    ESOPHAGOGASTRODUODENOSCOPY  12/2023    ESOPHAGOGASTRODUODENOSCOPY  2013    EX LAP, ILEOCOLIC RESECTION, ILEOSTOMY  10/16/2010    EXPLORATORY LAPAROTOMY W/ BOWEL RESECTION  10/16/2010    ex lap, ileocolic resection, ileostomy for postoperative bowel perforation    EXPLORATORY LAPAROTOMY, EXTENSIVE LYSIS OF ADHESIONS, RESECTION OF ILEOCOLIC ANASTOMOSIS, END ILEOSTOMY, REMOVAL OF MESH  06/28/2012    FRACTURED FIBULA      FRACTURED WRIST      ILEOSTOMY      leaks afterwards    IR COLON W/ CONTRAST: MULTIPLE LOOPS OF REDUNDANT COLON, PROMINENT SIGMOID.  08/23/2004    PORTACATH  PLACEMENT & REMOVAL      SHOULDER REPLACEMENT Right 2024    TONSILLECTOMY      TUBAL LIGATION         Problem list:    Patient Active Problem List    Diagnosis Date Noted    S/P total knee arthroplasty, right 2025     Priority: Medium    S/P total hip arthroplasty 2023     Priority: Medium    Closed displaced fracture of right femoral neck (H) 2023     Priority: Medium    Degeneration of lumbar intervertebral disc 11/10/2020     Priority: Medium    History of malignant neoplasm 2020     Priority: Medium    Mixed hyperlipidemia 2019     Priority: Medium     Overview:   Last Lipids:  Chol: 245    2007  T    2007  HDL:   94    2007  LDL:  128    2007      Fibromyalgia 2019     Priority: Medium    Shoulder impingement syndrome, right 2018     Priority: Medium    Benign essential hypertension 10/15/2017     Priority: Medium     Overview:   Trial of lisinopril, propranolol; Stresses anxety, amlodipine 2.5 min bowel not emptying  Metoprolol: 25 mg is ok.  Increase to 1.5, bowel does not  across upper right. Stenotic area.       Chronic constipation 2017     Priority: Medium    Sciatica of left side 2015     Priority: Medium     Formatting of this note might be different from the original. MRI: 2015 forminal stenosis L 4,5 more on left      Osteoarthritis of CMC joint of thumb 2013     Priority: Medium    Encounter for long-term (current) use of high-risk medication 02/15/2013     Priority: Medium    Colocutaneous fistula 2012     Priority: Medium    Malignant neoplasm of transverse colon (H) 10/06/2010     Priority: Medium     Formatting of this note might be different from the original. Stage 3A   nodes positive: 10/6/2010 on colonoscopy: at [facility] [place].   Noted mass:  Invasive adenocarcinoma:  CEA 3.7, size 2 x 12.7 cm. Dr. Vázquez. Current surgeon Treated with chemo:      Abdominal pain 2009     Priority:  Medium     Formatting of this note might be different from the original. Dr Stanley Goldberg:  Feels she had sigmoind volvulous      Allergic rhinitis 06/20/2007     Priority: Medium    Symptomatic menopausal or female climacteric states 10/06/2006     Priority: Medium    Closed fracture of part of fibula 09/22/2006     Priority: Medium     Formatting of this note might be different from the original. DEXA -2.1      Osteoporosis 09/22/2006     Priority: Medium     Formatting of this note is different from the original. VITAMIN D TOTAL Date Value Range Status 1/24/2013 44.6  30.0 - 80.0 ng/mL Final 10/9/2009 39.7  30.0-80.0 ng/mL Final osteoporosis diagnosed low bone density and wrist fracture. She's completed 2 years of risedronate, and 3 years ofReclast: 2009- 2014 completed DEXA: AP-1.2, hip right and left:  -2.1 Fracture fibula age 51 Fosamax. 6/20/2007 started one dose: severe pain in epigastrium. Actonel 6/07- 10/08.  Stopped for GI problem: nocturnal pain 20 mins then resolves.  Increased in frequency. Still gets the pain once per week. 10/8/2009 DEXA right hip -2.3, -2.1, ap: -1.3 better spine, no change hips 12/3/2012 DEXA: -2.1 no sig change c/w 2009 12/3/2012 FRAX calculation of Fracture risk: Major osteoporotic fracture 17.01 % risk in 10 years Hip Fracture:  3.1 % risk in 10 years. Treatment is recommended if over 3% for Hip fracture or over 20% for any fracture. This calculation is based on previous fractures, height, weight, age, family history, personal habits (smoking and drinking), and use of medications that increases risk, as well as bone density.         Medications:  Current Outpatient Medications   Medication Sig Dispense Refill    acetaminophen (TYLENOL) 325 MG tablet Take 3 tablets (975 mg) by mouth every 8 hours as needed for other (mild pain). 60 tablet 0    amitriptyline (ELAVIL) 25 MG tablet Take 1 tablet (25 mg) by mouth At Bedtime 30 tablet 0    cefdinir (OMNICEF) 300 MG capsule Take 1  "capsule (300 mg) by mouth 2 times daily. 20 capsule 0    celecoxib (CELEBREX) 200 MG capsule Take 1 capsule (200 mg) by mouth daily for 14 days. Do not take within 6 hours of ibuprofen (MOTRIN, ADVIL) or ketorolac (TORADOL) if prescribed. 14 capsule 0    HYDROcodone-acetaminophen (NORCO) 5-325 MG tablet Take 1-2 tablets by mouth every 4 hours as needed for moderate to severe pain. 30 tablet 0    hydrOXYzine HCl (ATARAX) 25 MG tablet Take 1 tablet (25 mg) by mouth every 6 hours as needed for itching or anxiety (with pain, moderate pain). (Patient not taking: Reported on 4/25/2025) 60 tablet 2    lisinopril (ZESTRIL) 10 MG tablet Take 10 mg by mouth daily      LORazepam (ATIVAN) 0.5 MG tablet Take 0.5 tablets (0.25 mg) by mouth 2 times daily as needed for anxiety 10 tablet 0    metoprolol succinate ER (TOPROL-XL) 25 MG 24 hr tablet Take 100 mg by mouth daily. (4 x 25 mg = 100 mg)      rivaroxaban ANTICOAGULANT (XARELTO) 10 MG TABS tablet Take 1 tablet (10 mg) by mouth daily (with breakfast). (Patient not taking: Reported on 4/25/2025) 13 tablet 0    senna-docusate (SENOKOT-S/PERICOLACE) 8.6-50 MG tablet Take 1-2 tablets by mouth 2 times daily. Take while on oral narcotics to prevent or treat constipation. (Patient not taking: Reported on 4/25/2025) 30 tablet 0    temazepam (RESTORIL) 15 MG capsule Take 1 capsule (15 mg) by mouth At Bedtime 15 capsule 0       Allergies:  Allergies   Allergen Reactions    Alendronate GI Disturbance     Shut down system  Constipation    Morphine Headache     Mental status change \"felt Psychotic\"    Risedronate GI Disturbance     Shut down system  Constipation       Family history:  Family History   Problem Relation Age of Onset    Angina Mother     Heart Disease Father     Cerebrovascular Disease Sister        Social history:  Social History     Tobacco Use    Smoking status: Former     Current packs/day: 0.00     Average packs/day: 0.5 packs/day for 12.0 years (6.0 ttl pk-yrs)     " Types: Cigarettes     Start date: 1968     Quit date: 1980     Years since quittin.3    Smokeless tobacco: Never   Substance Use Topics    Alcohol use: Not Currently    Marital status: .  Occupation: n/a.    There are no exam notes on file for this visit.     Physical Examination:  There were no vitals taken for this visit.  General: alert, oriented, in no acute distress, sitting comfortably  Respiratory: non-labored breathing on RA  Abdomen: soft, nondistended.  Midline hernia large, reducible.  No mass       Assessment:   This is a 80 year old female with a history of colon cancer.  Right sided abdominal pain with normal imaging.  May be muscular, related from hernia.  Nothing to worry about.  Some chronic nausea.  Will restart PPI to see if improves.  Has some constipation on CT, will increase senna slightly.  Wants to restart Celebrex/wean off hydrocodone - will get EGD follow up.       All pertinent labs and imaging were personally reviewed by me.     Plan:  - We will restart your PPI to see if it helps with nausea  - Recommend considering repeat EGD prior to starting Celebrex if you do  - Increased your evening Senna S dose to 2 tablets;  keep the AM dose at one tablet   - EGD - follow up NSAID ulcers     Loren Cherry MD     Division of Colon & Rectal Surgery  [facility]        The Division of Colon and Rectal Surgery at [facility] is committed to advancing discovery and innovation in human health through research. Sixto Jimenez is willing to be contacted by our research team if he qualify for any future research studies: N/A    This note was created using speech recognition software and may contain unintended word substitutions.    Referring provider:  Diana Vázquez MD  [facility]  [address] [address]     Primary Care Provider:  Nirali Lenz

## 2025-05-01 NOTE — PATIENT INSTRUCTIONS
We will restart your PPI to see if it helps with nausea. Sent that to your pharmacy.   Recommend considering repeat EGD prior to starting Celebrex if you do. Endoscopy will call you to schedule this or you can call the number on the referral.  Increased your evening Senna S dose to 2 tablets;  keep the AM dose at one tablet

## 2025-05-09 ENCOUNTER — HOSPITAL ENCOUNTER (OUTPATIENT)
Facility: CLINIC | Age: 80
End: 2025-05-09
Attending: INTERNAL MEDICINE | Admitting: INTERNAL MEDICINE
Payer: MEDICARE

## 2025-05-12 ENCOUNTER — TELEPHONE (OUTPATIENT)
Dept: GASTROENTEROLOGY | Facility: CLINIC | Age: 80
End: 2025-05-12
Payer: MEDICARE

## 2025-05-12 NOTE — TELEPHONE ENCOUNTER
Caller: Sixto Jimenez     Reason for Reschedule/Cancellation (please be detailed, any staff messages or encounters to note?):   No  at this time/will call back if she decided to reschedule with us    Did you cancel or rescheduled an EUS procedure? No.    Is screening questionnaire older than 3 months from the reschedule date.   If Yes, please complete screening questionnaire. No    Prior to reschedule please review:  Ordering Provider: Dilip Park Determined: mac  Does patient have any ASC Exclusions, please identify?: no    Notes on Cancelled Procedure:  Procedure: Upper Endoscopy [EGD]   Date: 5/20  Location: [facility]; [address] [place] [address]   Surgeon: Luis Antonio    Rescheduled: No,

## 2025-07-05 ENCOUNTER — HEALTH MAINTENANCE LETTER (OUTPATIENT)
Age: 80
End: 2025-07-05

## (undated) DEVICE — SOL NACL 0.9% IRRIG 1000ML BOTTLE 2F7124

## (undated) DEVICE — SOLUTION IV IRRIGATION 0.9% NACL 3L R8206

## (undated) DEVICE — SUCTION IRR SYSTEM W/O TIP INTERPULSE HANDPIECE 0210-100-000

## (undated) DEVICE — DRAPE SHEET REV FOLD 3/4 9349

## (undated) DEVICE — SU VICRYL 0 CT-1 CR 8X18" J740D

## (undated) DEVICE — LINEN TOWEL PACK X5 5464

## (undated) DEVICE — SOL WATER IRRIG 1000ML BOTTLE 2F7114

## (undated) DEVICE — SYR 30ML LL W/O NDL 302832

## (undated) DEVICE — BONE CLEANING TIP INTERPULSE  0210-010-000

## (undated) DEVICE — BONE CEMENT MIXEVAC III HI VAC KIT  0206-015-000

## (undated) DEVICE — NDL SPINAL 18GA 3.5" 405184

## (undated) DEVICE — GOWN IMPERVIOUS SPECIALTY XL/XLONG 39049

## (undated) DEVICE — BAG DECANTER STERILE WHITE DYNJDEC09

## (undated) DEVICE — SU VICRYL 1 CT-1 27" J341H

## (undated) DEVICE — LINEN DRAPE 54X72" 5467

## (undated) DEVICE — GLOVE BIOGEL PI MICRO INDICATOR UNDERGLOVE SZ 8.5 48985

## (undated) DEVICE — SU FIBERWIRE 2 38"  AR-7200

## (undated) DEVICE — BLADE SAW RECIP STRK 70X12.5X1.2MM 0277-096-281

## (undated) DEVICE — SU VICRYL 2-0 CT-1 27" UND J259H

## (undated) DEVICE — GLOVE BIOGEL PI MICRO INDICATOR UNDERGLOVE SZ 7.5 48975

## (undated) DEVICE — SU ETHIBOND 0 CT-1 CR 8X18" CX21D

## (undated) DEVICE — SUCTION MANIFOLD NEPTUNE 2 SYS 4 PORT 0702-020-000

## (undated) DEVICE — PAD ABD 10X8IN DERMACEA ABS NWVN 4 SL EDG SFT LF STRL 7198D

## (undated) DEVICE — SU ETHIBOND 0 CTX CR  8X18" CX31D

## (undated) DEVICE — ESU GROUND PAD UNIVERSAL W/O CORD

## (undated) DEVICE — DRAPE CONVERTORS U-DRAPE 60X72" 8476

## (undated) DEVICE — WRAP EZY KNEE 1213PP

## (undated) DEVICE — DRILL BIT BIOM QUICK CONNECTING RING LOCK 3.2X30MM 31-323230

## (undated) DEVICE — GLOVE BIOGEL PI SZ 8.5 40885

## (undated) DEVICE — BLADE SAW SAGITTAL STRK 18X90X1.27MM HD SYS 6 6118-127-090

## (undated) DEVICE — PAD FOAM MCGUIRE KIT 0814-0150

## (undated) DEVICE — PACK TOTAL HIP W/POUCH SOP15HPFSM

## (undated) DEVICE — DRSG AQUACEL AG HYDROFIBER  3.5X10" 422605

## (undated) DEVICE — SOLUTION WOUND CLEANSING 3/4OZ 10% PVP EA-L3011FB-50

## (undated) DEVICE — SU ETHICON STRATAFIX SPR PS 3-0 30X30CM SXMP2B412

## (undated) DEVICE — SOL WATER IRRIG 1000ML BOTTLE 07139-09

## (undated) DEVICE — CAST PADDING 6" STERILE 9046S

## (undated) DEVICE — SU MONOCRYL 2-0 CT-2 27" Y333H

## (undated) DEVICE — DECANTER BAG 2002S

## (undated) DEVICE — SYR 50ML LL W/O NDL 309653

## (undated) DEVICE — GLOVE BIOGEL PI MICRO SZ 7.0 48570

## (undated) DEVICE — BLADE SAW SAGITTAL STRK 18X90X1.19MM HD SYS 6 6118-119-090

## (undated) DEVICE — DRSG AQUACEL AG HYDROFIBER 3.5X6" 422604

## (undated) DEVICE — PACK TOTAL KNEE SOP15TKFSD

## (undated) DEVICE — STPL SKIN 35W 6.9MM  PXW35

## (undated) DEVICE — DRSG AQUACEL AG 3.5X9.75" HYDROFIBER 412011

## (undated) RX ORDER — PROPOFOL 10 MG/ML
INJECTION, EMULSION INTRAVENOUS
Status: DISPENSED
Start: 2025-03-24

## (undated) RX ORDER — TRANEXAMIC ACID 650 MG/1
TABLET ORAL
Status: DISPENSED
Start: 2025-03-24

## (undated) RX ORDER — PROPOFOL 10 MG/ML
INJECTION, EMULSION INTRAVENOUS
Status: DISPENSED
Start: 2023-09-21

## (undated) RX ORDER — ONDANSETRON 2 MG/ML
INJECTION INTRAMUSCULAR; INTRAVENOUS
Status: DISPENSED
Start: 2023-09-21

## (undated) RX ORDER — FENTANYL CITRATE 50 UG/ML
INJECTION, SOLUTION INTRAMUSCULAR; INTRAVENOUS
Status: DISPENSED
Start: 2017-04-26

## (undated) RX ORDER — CEFAZOLIN SODIUM/WATER 2 G/20 ML
SYRINGE (ML) INTRAVENOUS
Status: DISPENSED
Start: 2023-09-21

## (undated) RX ORDER — FENTANYL CITRATE 50 UG/ML
INJECTION, SOLUTION INTRAMUSCULAR; INTRAVENOUS
Status: DISPENSED
Start: 2023-09-21

## (undated) RX ORDER — DEXAMETHASONE SODIUM PHOSPHATE 4 MG/ML
INJECTION, SOLUTION INTRA-ARTICULAR; INTRALESIONAL; INTRAMUSCULAR; INTRAVENOUS; SOFT TISSUE
Status: DISPENSED
Start: 2023-09-21

## (undated) RX ORDER — ONDANSETRON 2 MG/ML
INJECTION INTRAMUSCULAR; INTRAVENOUS
Status: DISPENSED
Start: 2025-03-24